# Patient Record
Sex: FEMALE | Race: WHITE | NOT HISPANIC OR LATINO
[De-identification: names, ages, dates, MRNs, and addresses within clinical notes are randomized per-mention and may not be internally consistent; named-entity substitution may affect disease eponyms.]

---

## 2017-03-02 ENCOUNTER — RECORD ABSTRACTING (OUTPATIENT)
Age: 82
End: 2017-03-02

## 2017-03-02 ENCOUNTER — APPOINTMENT (OUTPATIENT)
Dept: CARDIOTHORACIC SURGERY | Facility: CLINIC | Age: 82
End: 2017-03-02

## 2017-03-02 VITALS
OXYGEN SATURATION: 97 % | HEIGHT: 60 IN | DIASTOLIC BLOOD PRESSURE: 65 MMHG | RESPIRATION RATE: 16 BRPM | SYSTOLIC BLOOD PRESSURE: 125 MMHG | WEIGHT: 114 LBS | HEART RATE: 65 BPM | BODY MASS INDEX: 22.38 KG/M2

## 2017-03-02 DIAGNOSIS — Z82.49 FAMILY HISTORY OF ISCHEMIC HEART DISEASE AND OTHER DISEASES OF THE CIRCULATORY SYSTEM: ICD-10-CM

## 2017-03-02 DIAGNOSIS — Z86.79 PERSONAL HISTORY OF OTHER DISEASES OF THE CIRCULATORY SYSTEM: ICD-10-CM

## 2017-03-08 ENCOUNTER — OUTPATIENT (OUTPATIENT)
Dept: OUTPATIENT SERVICES | Facility: HOSPITAL | Age: 82
LOS: 1 days | End: 2017-03-08
Payer: MEDICARE

## 2017-03-08 DIAGNOSIS — I35.0 NONRHEUMATIC AORTIC (VALVE) STENOSIS: ICD-10-CM

## 2017-03-08 LAB
BUN SERPL-MCNC: 66 MG/DL — HIGH (ref 8–20)
CREAT SERPL-MCNC: 2.45 MG/DL — HIGH (ref 0.5–1.3)

## 2017-03-08 PROCEDURE — 84520 ASSAY OF UREA NITROGEN: CPT

## 2017-03-08 PROCEDURE — 93306 TTE W/DOPPLER COMPLETE: CPT | Mod: 26

## 2017-03-08 PROCEDURE — 82565 ASSAY OF CREATININE: CPT

## 2017-03-08 PROCEDURE — 36415 COLL VENOUS BLD VENIPUNCTURE: CPT

## 2017-03-08 PROCEDURE — 93306 TTE W/DOPPLER COMPLETE: CPT

## 2017-03-09 DIAGNOSIS — I35.0 NONRHEUMATIC AORTIC (VALVE) STENOSIS: ICD-10-CM

## 2017-03-10 ENCOUNTER — OUTPATIENT (OUTPATIENT)
Dept: OUTPATIENT SERVICES | Facility: HOSPITAL | Age: 82
LOS: 1 days | End: 2017-03-10
Payer: MEDICARE

## 2017-03-10 VITALS
HEART RATE: 63 BPM | DIASTOLIC BLOOD PRESSURE: 64 MMHG | OXYGEN SATURATION: 97 % | WEIGHT: 113.1 LBS | RESPIRATION RATE: 12 BRPM | SYSTOLIC BLOOD PRESSURE: 119 MMHG | TEMPERATURE: 98 F

## 2017-03-10 VITALS
HEART RATE: 63 BPM | TEMPERATURE: 98 F | SYSTOLIC BLOOD PRESSURE: 119 MMHG | RESPIRATION RATE: 18 BRPM | OXYGEN SATURATION: 98 % | WEIGHT: 114.64 LBS | HEIGHT: 60 IN | DIASTOLIC BLOOD PRESSURE: 64 MMHG

## 2017-03-10 DIAGNOSIS — Z90.710 ACQUIRED ABSENCE OF BOTH CERVIX AND UTERUS: Chronic | ICD-10-CM

## 2017-03-10 DIAGNOSIS — Z01.818 ENCOUNTER FOR OTHER PREPROCEDURAL EXAMINATION: ICD-10-CM

## 2017-03-10 DIAGNOSIS — Z90.49 ACQUIRED ABSENCE OF OTHER SPECIFIED PARTS OF DIGESTIVE TRACT: Chronic | ICD-10-CM

## 2017-03-10 LAB
ANION GAP SERPL CALC-SCNC: 13 MMOL/L — SIGNIFICANT CHANGE UP (ref 5–17)
APTT BLD: 30.5 SEC — SIGNIFICANT CHANGE UP (ref 27.5–37.4)
BASOPHILS # BLD AUTO: 0 K/UL — SIGNIFICANT CHANGE UP (ref 0–0.2)
BASOPHILS NFR BLD AUTO: 0.8 % — SIGNIFICANT CHANGE UP (ref 0–2)
BUN SERPL-MCNC: 64 MG/DL — HIGH (ref 8–20)
CALCIUM SERPL-MCNC: 8.9 MG/DL — SIGNIFICANT CHANGE UP (ref 8.6–10.2)
CHLORIDE SERPL-SCNC: 100 MMOL/L — SIGNIFICANT CHANGE UP (ref 98–107)
CO2 SERPL-SCNC: 25 MMOL/L — SIGNIFICANT CHANGE UP (ref 22–29)
CREAT SERPL-MCNC: 1.98 MG/DL — HIGH (ref 0.5–1.3)
EOSINOPHIL # BLD AUTO: 0.2 K/UL — SIGNIFICANT CHANGE UP (ref 0–0.5)
EOSINOPHIL NFR BLD AUTO: 4.6 % — SIGNIFICANT CHANGE UP (ref 0–6)
GLUCOSE SERPL-MCNC: 121 MG/DL — HIGH (ref 70–115)
HCT VFR BLD CALC: 33.8 % — LOW (ref 37–47)
HGB BLD-MCNC: 11.1 G/DL — LOW (ref 12–16)
INR BLD: 1.08 RATIO — SIGNIFICANT CHANGE UP (ref 0.88–1.16)
LYMPHOCYTES # BLD AUTO: 1.5 K/UL — SIGNIFICANT CHANGE UP (ref 1–4.8)
LYMPHOCYTES # BLD AUTO: 31.5 % — SIGNIFICANT CHANGE UP (ref 20–55)
MCHC RBC-ENTMCNC: 31.8 PG — HIGH (ref 27–31)
MCHC RBC-ENTMCNC: 32.8 G/DL — SIGNIFICANT CHANGE UP (ref 32–36)
MCV RBC AUTO: 96.8 FL — SIGNIFICANT CHANGE UP (ref 81–99)
MONOCYTES # BLD AUTO: 0.4 K/UL — SIGNIFICANT CHANGE UP (ref 0–0.8)
MONOCYTES NFR BLD AUTO: 8.3 % — SIGNIFICANT CHANGE UP (ref 3–10)
NEUTROPHILS # BLD AUTO: 2.6 K/UL — SIGNIFICANT CHANGE UP (ref 1.8–8)
NEUTROPHILS NFR BLD AUTO: 54.6 % — SIGNIFICANT CHANGE UP (ref 37–73)
PLATELET # BLD AUTO: 159 K/UL — SIGNIFICANT CHANGE UP (ref 150–400)
POTASSIUM SERPL-MCNC: 4.5 MMOL/L — SIGNIFICANT CHANGE UP (ref 3.5–5.3)
POTASSIUM SERPL-SCNC: 4.5 MMOL/L — SIGNIFICANT CHANGE UP (ref 3.5–5.3)
PROTHROM AB SERPL-ACNC: 11.9 SEC — SIGNIFICANT CHANGE UP (ref 10–13.1)
RBC # BLD: 3.49 M/UL — LOW (ref 4.4–5.2)
RBC # FLD: 13.4 % — SIGNIFICANT CHANGE UP (ref 11–15.6)
SODIUM SERPL-SCNC: 138 MMOL/L — SIGNIFICANT CHANGE UP (ref 135–145)
WBC # BLD: 4.8 K/UL — SIGNIFICANT CHANGE UP (ref 4.8–10.8)
WBC # FLD AUTO: 4.8 K/UL — SIGNIFICANT CHANGE UP (ref 4.8–10.8)

## 2017-03-10 PROCEDURE — 85730 THROMBOPLASTIN TIME PARTIAL: CPT

## 2017-03-10 PROCEDURE — 80048 BASIC METABOLIC PNL TOTAL CA: CPT

## 2017-03-10 PROCEDURE — 93010 ELECTROCARDIOGRAM REPORT: CPT

## 2017-03-10 PROCEDURE — 93005 ELECTROCARDIOGRAM TRACING: CPT

## 2017-03-10 PROCEDURE — 85610 PROTHROMBIN TIME: CPT

## 2017-03-10 PROCEDURE — G0463: CPT

## 2017-03-10 PROCEDURE — 85027 COMPLETE CBC AUTOMATED: CPT

## 2017-03-10 NOTE — ASU PATIENT PROFILE, ADULT - PMH
Anxiety    Aortic stenosis    Atrial fibrillation    Bruises easily    CHF (congestive heart failure)    CLIFTON (dyspnea on exertion)    HTN (hypertension)    NSTEMI (non-ST elevated myocardial infarction)    Orthopnea    Urine frequency

## 2017-03-10 NOTE — H&P PST ADULT - HISTORY OF PRESENT ILLNESS
This is a 89 year old female that was recently diagnosed with CHF she was admitted to [facility] for 3 days of treatment .  At that hospital admission she was diagnosed with severe AS.  Her Cardiologist Dr Vincenzo Tuttle out of [place] referred her to Dr Anne for possible TAVR.  She was recently consulted with Dr Anne and was found to have severe renal dysfunction.  She is now under the care of Dr Asad Espana nephrology.  I discuseed her recent creatine 2.45 and he recommend 3 hours of hydration with NS at 100cc hr and then 60cchr after that for 2 more hours. (due to age as well as GFR) .     She will hold lasix the day of the cath.   ECHO EF 55-60% aortic gradient was 84.53/  57.84  severe AS     Dr Asad Espana # is [phone] Cell

## 2017-03-13 ENCOUNTER — OUTPATIENT (OUTPATIENT)
Dept: OUTPATIENT SERVICES | Facility: HOSPITAL | Age: 82
LOS: 1 days | End: 2017-03-13
Payer: MEDICARE

## 2017-03-13 VITALS
RESPIRATION RATE: 18 BRPM | HEART RATE: 65 BPM | OXYGEN SATURATION: 100 % | SYSTOLIC BLOOD PRESSURE: 127 MMHG | DIASTOLIC BLOOD PRESSURE: 54 MMHG | TEMPERATURE: 98 F

## 2017-03-13 VITALS — WEIGHT: 113.1 LBS

## 2017-03-13 DIAGNOSIS — I35.0 NONRHEUMATIC AORTIC (VALVE) STENOSIS: ICD-10-CM

## 2017-03-13 DIAGNOSIS — I25.10 ATHEROSCLEROTIC HEART DISEASE OF NATIVE CORONARY ARTERY WITHOUT ANGINA PECTORIS: ICD-10-CM

## 2017-03-13 DIAGNOSIS — Z01.818 ENCOUNTER FOR OTHER PREPROCEDURAL EXAMINATION: ICD-10-CM

## 2017-03-13 DIAGNOSIS — Z90.49 ACQUIRED ABSENCE OF OTHER SPECIFIED PARTS OF DIGESTIVE TRACT: Chronic | ICD-10-CM

## 2017-03-13 DIAGNOSIS — Z90.710 ACQUIRED ABSENCE OF BOTH CERVIX AND UTERUS: Chronic | ICD-10-CM

## 2017-03-13 PROCEDURE — 93454 CORONARY ARTERY ANGIO S&I: CPT | Mod: 26

## 2017-03-13 PROCEDURE — C1887: CPT

## 2017-03-13 PROCEDURE — 37252 INTRVASC US NONCORONARY 1ST: CPT

## 2017-03-13 PROCEDURE — 36246 INS CATH ABD/L-EXT ART 2ND: CPT

## 2017-03-13 PROCEDURE — 92979 ENDOLUMINL IVUS OCT C EA: CPT | Mod: 26,RC

## 2017-03-13 PROCEDURE — C1894: CPT

## 2017-03-13 PROCEDURE — C1753: CPT

## 2017-03-13 PROCEDURE — C1769: CPT

## 2017-03-13 PROCEDURE — 37253 INTRVASC US NONCORONARY ADDL: CPT | Mod: XU

## 2017-03-13 PROCEDURE — 93454 CORONARY ARTERY ANGIO S&I: CPT

## 2017-03-13 PROCEDURE — 92978 ENDOLUMINL IVUS OCT C 1ST: CPT | Mod: 26,LC

## 2017-03-13 RX ORDER — SODIUM CHLORIDE 9 MG/ML
1000 INJECTION INTRAMUSCULAR; INTRAVENOUS; SUBCUTANEOUS
Qty: 0 | Refills: 0 | Status: DISCONTINUED | OUTPATIENT
Start: 2017-03-13 | End: 2017-03-28

## 2017-03-13 RX ORDER — ASPIRIN/CALCIUM CARB/MAGNESIUM 324 MG
81 TABLET ORAL ONCE
Qty: 0 | Refills: 0 | Status: COMPLETED | OUTPATIENT
Start: 2017-03-13 | End: 2017-03-13

## 2017-03-13 RX ADMIN — Medication 81 MILLIGRAM(S): at 08:35

## 2017-03-13 RX ADMIN — SODIUM CHLORIDE 100 MILLILITER(S): 9 INJECTION INTRAMUSCULAR; INTRAVENOUS; SUBCUTANEOUS at 09:19

## 2017-03-13 NOTE — DISCHARGE NOTE ADULT - CARE PLAN
Principal Discharge DX:	Aortic stenosis  Goal:	ADL without CP  Instructions for follow-up, activity and diet:	No heavy lifting, driving, sex, tub baths, swimming, or any activity that submerges the lower half of the body in water for 48 hours.  Limited walking and stairs for 48 hours.    Change the bandaid after 24 hours and every 24 hours after that.  Keep the puncture site dry and covered with a bandaid until a scab forms.    Observe the site frequently.  If bleeding or a large lump (the size of a golf ball or bigger) occurs lie flat, apply continuous direct pressure just above the puncture site for at least 10 minutes, and notify your physician immediately.  If the bleeding cannot be controlled, call 911 immediately for assistance.  Notify your physician of pain, swelling or any drainage.    Notify your physician immediately if coldness, numbness, discoloration or pain in your foot occurs.

## 2017-03-13 NOTE — DISCHARGE NOTE ADULT - MEDICATION SUMMARY - MEDICATIONS TO TAKE
I will START or STAY ON the medications listed below when I get home from the hospital:    RESVERATROL  --   once a day  -- Indication: For supplement    ESSENTIAL BALANCE ENZYME  --   once a day  -- Indication: For supplement    UBIQUNONE  --   once a day  -- Indication: For supplement    aspirin 81 mg oral tablet  -- 1 tab(s) by mouth once a day  -- Indication: For supplement    calcium (as carbonate) 500 mg oral tablet  --  by mouth once a day  -- Indication: For Supplement    Plavix 75 mg oral tablet  -- 1 tab(s) by mouth once a day  -- Indication: For Nonrheumatic aortic valve stenosis    Xanax 0.25 mg oral tablet  --  by mouth once a day (at bedtime)  -- Indication: For anxiety    atenolol 50 mg oral tablet  --  by mouth 2 times a day  -- Indication: For Nonrheumatic aortic valve stenosis    furosemide 20 mg oral tablet  -- 1 tab(s) by mouth once a day  -- Indication: For Nonrheumatic aortic valve stenosis    Arginine  --  by mouth once a day  -- Indication: For supplement    timolol hemihydrate 0.5% ophthalmic solution  --  to each affected eye LEFT EYE  -- Indication: For glaucoma    Primadophilus Bifidus oral capsule  -- 1 cap(s) by mouth once a day  -- Indication: For supplement    multivitamin  --   once a day  -- Indication: For supplement    Vitamin D3 5000 intl units oral tablet  -- 1 tab(s) by mouth once a day  -- Indication: For supplement    Vitamin C 500 mg oral tablet  -- 1 tab(s) by mouth once a day  -- Indication: For supplement

## 2017-03-13 NOTE — DISCHARGE NOTE ADULT - CARE PROVIDER_API CALL
Guillermo Anne), Surgery; Thoracic and Cardiac Surgery  [address] [place] [address]  Phone: [phone]  Fax: [phone]

## 2017-03-13 NOTE — DISCHARGE NOTE ADULT - PLAN OF CARE
ADL without CP No heavy lifting, driving, sex, tub baths, swimming, or any activity that submerges the lower half of the body in water for 48 hours.  Limited walking and stairs for 48 hours.    Change the bandaid after 24 hours and every 24 hours after that.  Keep the puncture site dry and covered with a bandaid until a scab forms.    Observe the site frequently.  If bleeding or a large lump (the size of a golf ball or bigger) occurs lie flat, apply continuous direct pressure just above the puncture site for at least 10 minutes, and notify your physician immediately.  If the bleeding cannot be controlled, call 911 immediately for assistance.  Notify your physician of pain, swelling or any drainage.    Notify your physician immediately if coldness, numbness, discoloration or pain in your foot occurs.

## 2017-03-13 NOTE — DISCHARGE NOTE ADULT - HOSPITAL COURSE
Elective cath pre-op for TAVR. No intervention needed.  D/C home today.  Follow up with surgery for results.

## 2017-03-13 NOTE — DISCHARGE NOTE ADULT - NS AS ACTIVITY OBS
Do not make important decisions/No Heavy lifting/straining/Showering allowed/Sex allowed/Stairs allowed/Do not drive or operate machinery/Walking-Indoors allowed

## 2017-03-13 NOTE — DISCHARGE NOTE ADULT - CARE PROVIDERS DIRECT ADDRESSES
[email or web address]

## 2017-03-20 ENCOUNTER — INPATIENT (INPATIENT)
Facility: HOSPITAL | Age: 82
LOS: 6 days | Discharge: HOME HEALTH | DRG: 266 | End: 2017-03-27
Attending: THORACIC SURGERY (CARDIOTHORACIC VASCULAR SURGERY) | Admitting: THORACIC SURGERY (CARDIOTHORACIC VASCULAR SURGERY)
Payer: MEDICARE

## 2017-03-20 VITALS
DIASTOLIC BLOOD PRESSURE: 74 MMHG | WEIGHT: 113.1 LBS | TEMPERATURE: 98 F | RESPIRATION RATE: 24 BRPM | HEART RATE: 88 BPM | OXYGEN SATURATION: 100 % | SYSTOLIC BLOOD PRESSURE: 107 MMHG

## 2017-03-20 DIAGNOSIS — N18.4 CHRONIC KIDNEY DISEASE, STAGE 4 (SEVERE): ICD-10-CM

## 2017-03-20 DIAGNOSIS — R06.00 DYSPNEA, UNSPECIFIED: ICD-10-CM

## 2017-03-20 DIAGNOSIS — I10 ESSENTIAL (PRIMARY) HYPERTENSION: ICD-10-CM

## 2017-03-20 DIAGNOSIS — H40.9 UNSPECIFIED GLAUCOMA: ICD-10-CM

## 2017-03-20 DIAGNOSIS — Z90.49 ACQUIRED ABSENCE OF OTHER SPECIFIED PARTS OF DIGESTIVE TRACT: Chronic | ICD-10-CM

## 2017-03-20 DIAGNOSIS — I35.0 NONRHEUMATIC AORTIC (VALVE) STENOSIS: ICD-10-CM

## 2017-03-20 DIAGNOSIS — Z90.710 ACQUIRED ABSENCE OF BOTH CERVIX AND UTERUS: Chronic | ICD-10-CM

## 2017-03-20 DIAGNOSIS — I50.33 ACUTE ON CHRONIC DIASTOLIC (CONGESTIVE) HEART FAILURE: ICD-10-CM

## 2017-03-20 DIAGNOSIS — I48.0 PAROXYSMAL ATRIAL FIBRILLATION: ICD-10-CM

## 2017-03-20 LAB
ALBUMIN SERPL ELPH-MCNC: 3.9 G/DL — SIGNIFICANT CHANGE UP (ref 3.3–5.2)
ALP SERPL-CCNC: 48 U/L — SIGNIFICANT CHANGE UP (ref 40–120)
ALT FLD-CCNC: 16 U/L — SIGNIFICANT CHANGE UP
ANION GAP SERPL CALC-SCNC: 17 MMOL/L — SIGNIFICANT CHANGE UP (ref 5–17)
APTT BLD: 26.6 SEC — LOW (ref 27.5–37.4)
AST SERPL-CCNC: 25 U/L — SIGNIFICANT CHANGE UP
BASOPHILS # BLD AUTO: 0.1 K/UL — SIGNIFICANT CHANGE UP (ref 0–0.2)
BASOPHILS NFR BLD AUTO: 0.5 % — SIGNIFICANT CHANGE UP (ref 0–2)
BILIRUB SERPL-MCNC: 0.5 MG/DL — SIGNIFICANT CHANGE UP (ref 0.4–2)
BUN SERPL-MCNC: 78 MG/DL — HIGH (ref 8–20)
CALCIUM SERPL-MCNC: 9.1 MG/DL — SIGNIFICANT CHANGE UP (ref 8.6–10.2)
CHLORIDE SERPL-SCNC: 101 MMOL/L — SIGNIFICANT CHANGE UP (ref 98–107)
CK SERPL-CCNC: 31 U/L — SIGNIFICANT CHANGE UP (ref 25–170)
CO2 SERPL-SCNC: 23 MMOL/L — SIGNIFICANT CHANGE UP (ref 22–29)
CREAT SERPL-MCNC: 2.18 MG/DL — HIGH (ref 0.5–1.3)
D DIMER BLD IA.RAPID-MCNC: 673 NG/ML DDU — HIGH
EOSINOPHIL # BLD AUTO: 0.3 K/UL — SIGNIFICANT CHANGE UP (ref 0–0.5)
EOSINOPHIL NFR BLD AUTO: 2.4 % — SIGNIFICANT CHANGE UP (ref 0–6)
GLUCOSE SERPL-MCNC: 132 MG/DL — HIGH (ref 70–115)
HCT VFR BLD CALC: 34.4 % — LOW (ref 37–47)
HGB BLD-MCNC: 11.6 G/DL — LOW (ref 12–16)
INR BLD: 1.08 RATIO — SIGNIFICANT CHANGE UP (ref 0.88–1.16)
LYMPHOCYTES # BLD AUTO: 19.9 % — LOW (ref 20–55)
LYMPHOCYTES # BLD AUTO: 2.6 K/UL — SIGNIFICANT CHANGE UP (ref 1–4.8)
MCHC RBC-ENTMCNC: 32.4 PG — HIGH (ref 27–31)
MCHC RBC-ENTMCNC: 33.7 G/DL — SIGNIFICANT CHANGE UP (ref 32–36)
MCV RBC AUTO: 96.1 FL — SIGNIFICANT CHANGE UP (ref 81–99)
MONOCYTES # BLD AUTO: 1.2 K/UL — HIGH (ref 0–0.8)
MONOCYTES NFR BLD AUTO: 8.8 % — SIGNIFICANT CHANGE UP (ref 3–10)
NEUTROPHILS # BLD AUTO: 9 K/UL — HIGH (ref 1.8–8)
NEUTROPHILS NFR BLD AUTO: 68.2 % — SIGNIFICANT CHANGE UP (ref 37–73)
NT-PROBNP SERPL-SCNC: HIGH PG/ML (ref 0–300)
PLATELET # BLD AUTO: 197 K/UL — SIGNIFICANT CHANGE UP (ref 150–400)
POTASSIUM SERPL-MCNC: 4.4 MMOL/L — SIGNIFICANT CHANGE UP (ref 3.5–5.3)
POTASSIUM SERPL-SCNC: 4.4 MMOL/L — SIGNIFICANT CHANGE UP (ref 3.5–5.3)
PROT SERPL-MCNC: 7.7 G/DL — SIGNIFICANT CHANGE UP (ref 6.6–8.7)
PROTHROM AB SERPL-ACNC: 11.9 SEC — SIGNIFICANT CHANGE UP (ref 10–13.1)
RBC # BLD: 3.58 M/UL — LOW (ref 4.4–5.2)
RBC # FLD: 13.2 % — SIGNIFICANT CHANGE UP (ref 11–15.6)
SODIUM SERPL-SCNC: 141 MMOL/L — SIGNIFICANT CHANGE UP (ref 135–145)
TROPONIN T SERPL-MCNC: 0.13 NG/ML — HIGH (ref 0–0.06)
WBC # BLD: 13.1 K/UL — HIGH (ref 4.8–10.8)
WBC # FLD AUTO: 13.1 K/UL — HIGH (ref 4.8–10.8)

## 2017-03-20 PROCEDURE — 93010 ELECTROCARDIOGRAM REPORT: CPT

## 2017-03-20 PROCEDURE — 99223 1ST HOSP IP/OBS HIGH 75: CPT

## 2017-03-20 PROCEDURE — 99291 CRITICAL CARE FIRST HOUR: CPT

## 2017-03-20 PROCEDURE — 71010: CPT | Mod: 26

## 2017-03-20 PROCEDURE — 99232 SBSQ HOSP IP/OBS MODERATE 35: CPT

## 2017-03-20 RX ORDER — ASCORBIC ACID 60 MG
500 TABLET,CHEWABLE ORAL DAILY
Qty: 0 | Refills: 0 | Status: DISCONTINUED | OUTPATIENT
Start: 2017-03-20 | End: 2017-03-24

## 2017-03-20 RX ORDER — TIMOLOL 0.5 %
1 DROPS OPHTHALMIC (EYE)
Qty: 0 | Refills: 0 | Status: DISCONTINUED | OUTPATIENT
Start: 2017-03-20 | End: 2017-03-24

## 2017-03-20 RX ORDER — CLOPIDOGREL BISULFATE 75 MG/1
75 TABLET, FILM COATED ORAL DAILY
Qty: 0 | Refills: 0 | Status: DISCONTINUED | OUTPATIENT
Start: 2017-03-20 | End: 2017-03-22

## 2017-03-20 RX ORDER — SODIUM CHLORIDE 9 MG/ML
3 INJECTION INTRAMUSCULAR; INTRAVENOUS; SUBCUTANEOUS EVERY 8 HOURS
Qty: 0 | Refills: 0 | Status: DISCONTINUED | OUTPATIENT
Start: 2017-03-20 | End: 2017-03-24

## 2017-03-20 RX ORDER — ASPIRIN/CALCIUM CARB/MAGNESIUM 324 MG
81 TABLET ORAL DAILY
Qty: 0 | Refills: 0 | Status: DISCONTINUED | OUTPATIENT
Start: 2017-03-20 | End: 2017-03-24

## 2017-03-20 RX ORDER — FUROSEMIDE 40 MG
20 TABLET ORAL DAILY
Qty: 0 | Refills: 0 | Status: DISCONTINUED | OUTPATIENT
Start: 2017-03-21 | End: 2017-03-24

## 2017-03-20 RX ORDER — ATENOLOL 25 MG/1
50 TABLET ORAL ONCE
Qty: 0 | Refills: 0 | Status: COMPLETED | OUTPATIENT
Start: 2017-03-20 | End: 2017-03-20

## 2017-03-20 RX ORDER — FUROSEMIDE 40 MG
20 TABLET ORAL ONCE
Qty: 0 | Refills: 0 | Status: COMPLETED | OUTPATIENT
Start: 2017-03-20 | End: 2017-03-20

## 2017-03-20 RX ORDER — ATENOLOL 25 MG/1
50 TABLET ORAL EVERY 12 HOURS
Qty: 0 | Refills: 0 | Status: DISCONTINUED | OUTPATIENT
Start: 2017-03-20 | End: 2017-03-24

## 2017-03-20 RX ORDER — ALPRAZOLAM 0.25 MG
0.12 TABLET ORAL AT BEDTIME
Qty: 0 | Refills: 0 | Status: DISCONTINUED | OUTPATIENT
Start: 2017-03-20 | End: 2017-03-24

## 2017-03-20 RX ORDER — CALCIUM CARBONATE 500(1250)
1 TABLET ORAL DAILY
Qty: 0 | Refills: 0 | Status: DISCONTINUED | OUTPATIENT
Start: 2017-03-20 | End: 2017-03-24

## 2017-03-20 RX ORDER — SODIUM CHLORIDE 9 MG/ML
3 INJECTION INTRAMUSCULAR; INTRAVENOUS; SUBCUTANEOUS ONCE
Qty: 0 | Refills: 0 | Status: COMPLETED | OUTPATIENT
Start: 2017-03-20 | End: 2017-03-20

## 2017-03-20 RX ADMIN — Medication 81 MILLIGRAM(S): at 12:32

## 2017-03-20 RX ADMIN — CLOPIDOGREL BISULFATE 75 MILLIGRAM(S): 75 TABLET, FILM COATED ORAL at 12:32

## 2017-03-20 RX ADMIN — SODIUM CHLORIDE 3 MILLILITER(S): 9 INJECTION INTRAMUSCULAR; INTRAVENOUS; SUBCUTANEOUS at 16:07

## 2017-03-20 RX ADMIN — ATENOLOL 50 MILLIGRAM(S): 25 TABLET ORAL at 09:27

## 2017-03-20 RX ADMIN — Medication 1 TABLET(S): at 16:18

## 2017-03-20 RX ADMIN — Medication 20 MILLIGRAM(S): at 10:27

## 2017-03-20 RX ADMIN — SODIUM CHLORIDE 3 MILLILITER(S): 9 INJECTION INTRAMUSCULAR; INTRAVENOUS; SUBCUTANEOUS at 22:56

## 2017-03-20 RX ADMIN — Medication 500 MILLIGRAM(S): at 16:18

## 2017-03-20 RX ADMIN — SODIUM CHLORIDE 3 MILLILITER(S): 9 INJECTION INTRAMUSCULAR; INTRAVENOUS; SUBCUTANEOUS at 08:24

## 2017-03-20 RX ADMIN — ATENOLOL 50 MILLIGRAM(S): 25 TABLET ORAL at 23:14

## 2017-03-20 RX ADMIN — Medication 1 TABLET(S): at 23:10

## 2017-03-20 RX ADMIN — Medication 0.12 MILLIGRAM(S): at 23:10

## 2017-03-20 NOTE — H&P ADULT - NSHPPHYSICALEXAM_GEN_ALL_CORE
neuro: A+O x 3, non-focal, speech clear and intact  HEENT: R eye with subconjunctival hemmorhage, right suborbital ecchymosis  CV: RRR, +S1,S2, +ANA  Pulm: scant left basilar rales otherwise CTA, equal bilaterally, normal effort  Abd: soft, NT, ND, +BS  Ext: TOVAR x 4, trace edema  Skin: scattered ecchymosis

## 2017-03-20 NOTE — H&P ADULT - ASSESSMENT
89 year old female with PMH as above who p/w c/o worseing CLIFTON over past few days. Admitted with acute on chronic diastolic heart failure, severe AS.

## 2017-03-20 NOTE — ED ADULT NURSE REASSESSMENT NOTE - NS ED NURSE REASSESS COMMENT FT1
LAte entry : pt was taken off the BIBAP by RT , tolerating oxygen intake via N/C , no distress noted at this time, pt offers no complaints reading newspaper at present, VSS will continue to monitor

## 2017-03-20 NOTE — ED ADULT TRIAGE NOTE - CHIEF COMPLAINT QUOTE
BIBA, Patient states that she cant breathe, as per EMS she woke up 30 minutes ago with difficulty breathing while she was getting out of bed. Patient has a history of CHF, patient unable to speak in complete sentences. Dr. Mcdonald and respiratory called to bedside

## 2017-03-20 NOTE — H&P ADULT - PMH
Anxiety    Aortic stenosis    Atrial fibrillation    Bruises easily    CHF (congestive heart failure)    CLIFTON (dyspnea on exertion)    HTN (hypertension)    NSTEMI (non-ST elevated myocardial infarction)    Orthopnea    Urine frequency Anxiety    Aortic stenosis    Atrial fibrillation    Bruises easily    CHF (congestive heart failure)    CKD (chronic kidney disease) stage 4, GFR 15-29 ml/min    CLIFTON (dyspnea on exertion)    HTN (hypertension)    NSTEMI (non-ST elevated myocardial infarction)    Orthopnea    Urine frequency

## 2017-03-20 NOTE — CONSULT NOTE ADULT - SUBJECTIVE AND OBJECTIVE BOX
Renal :      CC: shortness of breath.     HPI: Patient is a  89yo  Female with history of mild CAD, severe AS planned for possible TAVR.     Presenting with symptoms of shortness of breath,  Increased in the past few days. Denies CP. Placed on Bipap and did well.     Know CKD - 4 , Under the care of A Nephrologist ( Dr. Lee et al )    PAST MEDICAL & SURGICAL HISTORY:  CKD (chronic kidney disease) stage 4, GFR 15-29 ml/min  Urine frequency  NSTEMI (non-ST elevated myocardial infarction)  Atrial fibrillation  HTN (hypertension)  CHF (congestive heart failure)  Aortic stenosis    H/O abdominal hysterectomy  History of cholecystectomy    FAMILY HISTORY:  No pertinent family history in first degree relatives    SOCIAL HISTORY: no EtOH, drugs or tobacco    MEDICATIONS  (STANDING):  sodium chloride 0.9% lock flush 3milliLiter(s) IV Push every 8 hours  aspirin enteric coated 81milliGRAM(s) Oral daily  calcium carbonate 500 mG (Tums) Chewable 1Tablet(s) Chew daily  clopidogrel Tablet 75milliGRAM(s) Oral daily  ATENolol  Tablet 50milliGRAM(s) Oral every 12 hours  timolol 0.5% Solution 1Drop(s) Left EYE two times a day  multivitamin 1Tablet(s) Oral daily  ascorbic acid 500milliGRAM(s) Oral daily    ROS:     Cardiology & Renal As in HPI,    All others negative    PHYSICAL EXAM:  Vital Signs Last 24 Hrs  T(C): 36.7, Max: 37 (03-20 @ 07:45)  T(F): 98.1, Max: 98.6 (03-20 @ 07:45)  HR: 72 (72 - 88)  BP: 108/52 (107/74 - 184/77)  BP(mean): --  RR: 22 (22 - 24)  SpO2: 99% (97% - 100%)      Appearance: Normal, On BiPAP, Orthopnea  Much improved,  HEENT:   Normal oral mucosa, PERRL, EOMI	  Lymphatic: No lymphadenopathy  Cardiovascular: Normal S1 S2, No JVD, No murmurs, No edema  Respiratory: Lungs clear to auscultation	  Psychiatry: A & O x 3, Mood & affect appropriate  Gastrointestinal:  Soft, Non-tender, + BS	  Skin: No rashes, No ecchymoses, No cyanosis  Neurologic: Non-focal  Extremities: Normal range of motion, No clubbing, cyanosis or edema  Vascular: Peripheral pulses palpable 2+ bilaterally    ECG: Deep t wave inversions in the anterior leads.     LABS:                        11.6   13.1  )-----------( 197      ( 20 Mar 2017 07:26 )             34.4     20 Mar 2017 07:26    141    |  101    |  78.0   ----------------------------<  132    4.4     |  23.0   |  2.18     Ca    9.1        20 Mar 2017 07:26    TPro  7.7    /  Alb  3.9    /  TBili  0.5    /  DBili  x      /  AST  25     /  ALT  16     /  AlkPhos  48     20 Mar 2017 07:26    PT/INR - ( 20 Mar 2017 07:26 )   PT: 11.9 sec;   INR: 1.08 ratio         PTT - ( 20 Mar 2017 07:26 )  PTT:26.6 sec    CARDIAC MARKERS ( 20 Mar 2017 07:26 )  x     / 0.13 ng/mL / 31 U/L / x

## 2017-03-20 NOTE — ED ADULT NURSE NOTE - OBJECTIVE STATEMENT
LAte entry : Pt received in the room already on the bipap placed in ambulance bay earlier As per daughter SOB started few days ago , pt recently had cardiac cath and was myranda off the Lasix, pt [place] , daughter provided all medical info

## 2017-03-20 NOTE — PROGRESS NOTE ADULT - SUBJECTIVE AND OBJECTIVE BOX
CC: shortness of breath.     HPI: Patient is a  89y Female with history of mild CAD, severe AS planned for possible TAVR. Presenting with symptoms of shortness of breath. Increased in the past few days. denies CP. Placed on Bipap and did well.     PAST MEDICAL & SURGICAL HISTORY:  CKD (chronic kidney disease) stage 4, GFR 15-29 ml/min  Urine frequency  NSTEMI (non-ST elevated myocardial infarction)  Atrial fibrillation  HTN (hypertension)  Orthopnea  CLIFTON (dyspnea on exertion)  CHF (congestive heart failure)  Bruises easily  Aortic stenosis  Anxiety  H/O abdominal hysterectomy  History of cholecystectomy    FAMILY HISTORY:  No pertinent family history in first degree relatives    SOCIAL HISTORY: no EtOH, drugs or tobacco    MEDICATIONS  (STANDING):  sodium chloride 0.9% lock flush 3milliLiter(s) IV Push every 8 hours  aspirin enteric coated 81milliGRAM(s) Oral daily  calcium carbonate 500 mG (Tums) Chewable 1Tablet(s) Chew daily  clopidogrel Tablet 75milliGRAM(s) Oral daily  ATENolol  Tablet 50milliGRAM(s) Oral every 12 hours  timolol 0.5% Solution 1Drop(s) Left EYE two times a day  multivitamin 1Tablet(s) Oral daily  ascorbic acid 500milliGRAM(s) Oral daily    ROS: All others negative    PHYSICAL EXAM:  Vital Signs Last 24 Hrs  T(C): 36.7, Max: 37 (03-20 @ 07:45)  T(F): 98.1, Max: 98.6 (03-20 @ 07:45)  HR: 72 (72 - 88)  BP: 108/52 (107/74 - 184/77)  BP(mean): --  RR: 22 (22 - 24)  SpO2: 99% (97% - 100%)  I&O's Summary    Appearance: Normal	  HEENT:   Normal oral mucosa, PERRL, EOMI	  Lymphatic: No lymphadenopathy  Cardiovascular: Normal S1 S2, No JVD, No murmurs, No edema  Respiratory: Lungs clear to auscultation	  Psychiatry: A & O x 3, Mood & affect appropriate  Gastrointestinal:  Soft, Non-tender, + BS	  Skin: No rashes, No ecchymoses, No cyanosis  Neurologic: Non-focal  Extremities: Normal range of motion, No clubbing, cyanosis or edema  Vascular: Peripheral pulses palpable 2+ bilaterally    ECG: Deep t wave inversions in the anterior leads.   LABS:                        11.6   13.1  )-----------( 197      ( 20 Mar 2017 07:26 )             34.4     20 Mar 2017 07:26    141    |  101    |  78.0   ----------------------------<  132    4.4     |  23.0   |  2.18     Ca    9.1        20 Mar 2017 07:26    TPro  7.7    /  Alb  3.9    /  TBili  0.5    /  DBili  x      /  AST  25     /  ALT  16     /  AlkPhos  48     20 Mar 2017 07:26    PT/INR - ( 20 Mar 2017 07:26 )   PT: 11.9 sec;   INR: 1.08 ratio         PTT - ( 20 Mar 2017 07:26 )  PTT:26.6 sec  CARDIAC MARKERS ( 20 Mar 2017 07:26 )  x     / 0.13 ng/mL / 31 U/L / x     / x

## 2017-03-20 NOTE — H&P ADULT - PROBLEM SELECTOR PLAN 1
admit to [facility]  lasix 20mg IV x 1 now  BiPAP PRN for SOB/increased work of breathing  seen and evaluated by cardiology

## 2017-03-20 NOTE — ED PROVIDER NOTE - CRITICAL CARE INDICATION, MLM
patient was critically ill... Patient was critically ill with a high probability of imminent or life threatening deterioration.  ELDERLY FEMALE WITH COMPLICATED MEDICAL HISTORY PRESENTS WITH RESPIRATORY DISTRESS. PATIENT REQUIRED STAT EXAM, LABS AND BIPAP. EKG + CHANGES. IN PROCESS OF PRE OPING FOR A TAVR WITH DR MORRIS. WILL ADMIT CTICU

## 2017-03-20 NOTE — H&P ADULT - NSHPLABSRESULTS_GEN_ALL_CORE
Complete Blood Count + Automated Diff (03.20.17 @ 07:26)    WBC Count: 13.1 K/uL    Hemoglobin: 11.6 g/dL    Hematocrit: 34.4 %    Platelet Count - Automated: 197 K/uL      Comprehensive Metabolic Panel (03.20.17 @ 07:26)    Sodium, Serum: 141 mmol/L    Potassium, Serum: 4.4 mmol/L    Chloride, Serum: 101 mmol/L    Carbon Dioxide, Serum: 23.0 mmol/L    Blood Urea Nitrogen, Serum: 78.0 mg/dL    Creatinine, Serum: 2.18 mg/dL    Glucose, Serum: 132 mg/dL      Serum Pro-Brain Natriuretic Peptide (03.20.17 @ 07:26)    Serum Pro-Brain Natriuretic Peptide: 51878    Prothrombin Time and INR, Plasma (03.20.17 @ 07:26)    Prothrombin Time, Plasma: 11.9 sec    INR: 1.08    Activated Partial Thromboplastin Time (03.20.17 @ 07:26)    Activated Partial Thromboplastin Time: 26.6    D-Dimer Assay, Quantitative (03.20.17 @ 07:26)    D-Dimer Assay, Quantitative: 673    3/20/17 @ 0746 CXR:  FINDINGS: Single frontal view of the chest demonstrates mild CHF. The cardiomediastinal silhouette is enlarged. No acute osseous abnormalities. Tiny right-sided pleural effusion.  IMPRESSION: Cardiomegaly with mild CHF.

## 2017-03-20 NOTE — ED PROVIDER NOTE - CARE PLAN
Principal Discharge DX:	Respiratory distress  Secondary Diagnosis:	Acute congestive heart failure, unspecified congestive heart failure type

## 2017-03-20 NOTE — H&P ADULT - HISTORY OF PRESENT ILLNESS
89 year old female with PMH know severe AS, non-obstructive CAD, HTN, NSTEMI, chronic diastolic heart failure, HTN, PAF (only on aspirin and plavix, no AC), bruises easily. Pt presented to ED this am with c/o worseing CLIFTON over past few days. Pt also reported one episode of urinary incontinence and dizziness a few days ago which self-resolved. Pt denies CP, palpitations, syncope, weakness, N/V, fever/chills, cough, dysuria, abd pain, or other c/o. Of note: pt woke up on 3/17 with right eye eccymosis and subconjunctival hemmorhage. Denies trauma, sneezing, cough. 89 year old female with PMH know severe AS, non-obstructive CAD, HTN, NSTEMI, stage 4 CKD, chronic diastolic heart failure, HTN, PAF (only on aspirin and plavix, no AC), bruises easily. Pt presented to ED this am with c/o worseing CLIFTON over past few days. Pt also reported one episode of urinary incontinence and dizziness a few days ago which self-resolved. Pt denies CP, palpitations, syncope, weakness, N/V, fever/chills, cough, dysuria, abd pain, or other c/o. Of note: pt woke up on 3/17 with right eye eccymosis and subconjunctival hemmorhage. Denies trauma, sneezing, cough.

## 2017-03-20 NOTE — ED PROVIDER NOTE - MEDICAL DECISION MAKING DETAILS
89 YO FEMALE PRESENTS IN CHF, REQUIRING BIPAP INTERVENTION. ADMIT TO HOSPITALIST AND CALL [facility] CARDIO, DR MORRIS TO BE NOTIFIED(HER CT SURGEON - PLANNING A TAVR)

## 2017-03-20 NOTE — ED PROVIDER NOTE - CRITICAL CARE PROVIDED
interpretation of diagnostic studies/40 MINUTES/consult w/ pt's family directly relating to pts condition/direct patient care (not related to procedure)/consultation with other physicians/documentation

## 2017-03-20 NOTE — H&P ADULT - NSHPOUTPATIENTPROVIDERS_GEN_ALL_CORE
Dr Venkatesh Tuttle (Cardiologist, [phone])  Dr Luis Lofton (PCP, [phone])  Dr Espana (Nephrologist, [phone])

## 2017-03-21 DIAGNOSIS — I35.0 NONRHEUMATIC AORTIC (VALVE) STENOSIS: ICD-10-CM

## 2017-03-21 LAB
24R-OH-CALCIDIOL SERPL-MCNC: 62.5 NG/ML — SIGNIFICANT CHANGE UP (ref 30–100)
ANION GAP SERPL CALC-SCNC: 13 MMOL/L — SIGNIFICANT CHANGE UP (ref 5–17)
APPEARANCE UR: CLEAR — SIGNIFICANT CHANGE UP
BACTERIA # UR AUTO: ABNORMAL
BILIRUB UR-MCNC: NEGATIVE — SIGNIFICANT CHANGE UP
BUN SERPL-MCNC: 79 MG/DL — HIGH (ref 8–20)
CALCIUM SERPL-MCNC: 8.5 MG/DL — LOW (ref 8.6–10.2)
CALCIUM SERPL-MCNC: 8.6 MG/DL — SIGNIFICANT CHANGE UP (ref 8.4–10.5)
CHLORIDE SERPL-SCNC: 103 MMOL/L — SIGNIFICANT CHANGE UP (ref 98–107)
CO2 SERPL-SCNC: 24 MMOL/L — SIGNIFICANT CHANGE UP (ref 22–29)
COLOR SPEC: YELLOW — SIGNIFICANT CHANGE UP
CREAT ?TM UR-MCNC: 54 MG/DL — SIGNIFICANT CHANGE UP
CREAT SERPL-MCNC: 2.18 MG/DL — HIGH (ref 0.5–1.3)
DIFF PNL FLD: NEGATIVE — SIGNIFICANT CHANGE UP
EPI CELLS # UR: SIGNIFICANT CHANGE UP
GLUCOSE SERPL-MCNC: 96 MG/DL — SIGNIFICANT CHANGE UP (ref 70–115)
GLUCOSE UR QL: NEGATIVE MG/DL — SIGNIFICANT CHANGE UP
HCT VFR BLD CALC: 29.2 % — LOW (ref 37–47)
HGB BLD-MCNC: 9.7 G/DL — LOW (ref 12–16)
HYALINE CASTS # UR AUTO: ABNORMAL /LPF
KETONES UR-MCNC: NEGATIVE — SIGNIFICANT CHANGE UP
LEUKOCYTE ESTERASE UR-ACNC: ABNORMAL
MAGNESIUM SERPL-MCNC: 2.2 MG/DL — SIGNIFICANT CHANGE UP (ref 1.8–2.5)
MCHC RBC-ENTMCNC: 32.1 PG — HIGH (ref 27–31)
MCHC RBC-ENTMCNC: 33.2 G/DL — SIGNIFICANT CHANGE UP (ref 32–36)
MCV RBC AUTO: 96.7 FL — SIGNIFICANT CHANGE UP (ref 81–99)
MRSA PCR RESULT.: SIGNIFICANT CHANGE UP
NITRITE UR-MCNC: NEGATIVE — SIGNIFICANT CHANGE UP
NT-PROBNP SERPL-SCNC: HIGH PG/ML (ref 0–300)
PH UR: 5 — SIGNIFICANT CHANGE UP (ref 4.8–8)
PLATELET # BLD AUTO: 162 K/UL — SIGNIFICANT CHANGE UP (ref 150–400)
POTASSIUM SERPL-MCNC: 4 MMOL/L — SIGNIFICANT CHANGE UP (ref 3.5–5.3)
POTASSIUM SERPL-SCNC: 4 MMOL/L — SIGNIFICANT CHANGE UP (ref 3.5–5.3)
PROT ?TM UR-MCNC: 8 MG/DL — SIGNIFICANT CHANGE UP (ref 0–12)
PROT UR-MCNC: NEGATIVE MG/DL — SIGNIFICANT CHANGE UP
PROT/CREAT UR-RTO: 0.15 RATIO — SIGNIFICANT CHANGE UP
PTH-INTACT FLD-MCNC: 36 PG/ML — SIGNIFICANT CHANGE UP (ref 15–65)
RBC # BLD: 3.02 M/UL — LOW (ref 4.4–5.2)
RBC # FLD: 12.9 % — SIGNIFICANT CHANGE UP (ref 11–15.6)
RBC CASTS # UR COMP ASSIST: SIGNIFICANT CHANGE UP /HPF (ref 0–4)
S AUREUS DNA NOSE QL NAA+PROBE: SIGNIFICANT CHANGE UP
SODIUM SERPL-SCNC: 140 MMOL/L — SIGNIFICANT CHANGE UP (ref 135–145)
SP GR SPEC: 1.01 — SIGNIFICANT CHANGE UP (ref 1.01–1.02)
UROBILINOGEN FLD QL: NEGATIVE MG/DL — SIGNIFICANT CHANGE UP
WBC # BLD: 7.2 K/UL — SIGNIFICANT CHANGE UP (ref 4.8–10.8)
WBC # FLD AUTO: 7.2 K/UL — SIGNIFICANT CHANGE UP (ref 4.8–10.8)
WBC UR QL: SIGNIFICANT CHANGE UP

## 2017-03-21 PROCEDURE — 93312 ECHO TRANSESOPHAGEAL: CPT | Mod: 26

## 2017-03-21 PROCEDURE — 76775 US EXAM ABDO BACK WALL LIM: CPT | Mod: 26

## 2017-03-21 PROCEDURE — 93320 DOPPLER ECHO COMPLETE: CPT | Mod: 26

## 2017-03-21 PROCEDURE — 93880 EXTRACRANIAL BILAT STUDY: CPT | Mod: 26

## 2017-03-21 PROCEDURE — 93325 DOPPLER ECHO COLOR FLOW MAPG: CPT | Mod: 26

## 2017-03-21 PROCEDURE — 76376 3D RENDER W/INTRP POSTPROCES: CPT | Mod: 26

## 2017-03-21 PROCEDURE — 99233 SBSQ HOSP IP/OBS HIGH 50: CPT

## 2017-03-21 PROCEDURE — 71010: CPT | Mod: 26

## 2017-03-21 RX ORDER — ERYTHROPOIETIN 10000 [IU]/ML
6000 INJECTION, SOLUTION INTRAVENOUS; SUBCUTANEOUS
Qty: 0 | Refills: 0 | Status: DISCONTINUED | OUTPATIENT
Start: 2017-03-21 | End: 2017-03-24

## 2017-03-21 RX ADMIN — Medication 1 TABLET(S): at 17:59

## 2017-03-21 RX ADMIN — ATENOLOL 50 MILLIGRAM(S): 25 TABLET ORAL at 17:59

## 2017-03-21 RX ADMIN — CLOPIDOGREL BISULFATE 75 MILLIGRAM(S): 75 TABLET, FILM COATED ORAL at 17:59

## 2017-03-21 RX ADMIN — ERYTHROPOIETIN 6000 UNIT(S): 10000 INJECTION, SOLUTION INTRAVENOUS; SUBCUTANEOUS at 17:59

## 2017-03-21 RX ADMIN — SODIUM CHLORIDE 3 MILLILITER(S): 9 INJECTION INTRAMUSCULAR; INTRAVENOUS; SUBCUTANEOUS at 05:39

## 2017-03-21 RX ADMIN — Medication 81 MILLIGRAM(S): at 17:59

## 2017-03-21 RX ADMIN — SODIUM CHLORIDE 3 MILLILITER(S): 9 INJECTION INTRAMUSCULAR; INTRAVENOUS; SUBCUTANEOUS at 15:35

## 2017-03-21 RX ADMIN — SODIUM CHLORIDE 3 MILLILITER(S): 9 INJECTION INTRAMUSCULAR; INTRAVENOUS; SUBCUTANEOUS at 23:01

## 2017-03-21 RX ADMIN — Medication 500 MILLIGRAM(S): at 17:59

## 2017-03-21 RX ADMIN — Medication 0.12 MILLIGRAM(S): at 23:10

## 2017-03-21 RX ADMIN — Medication 20 MILLIGRAM(S): at 05:40

## 2017-03-21 RX ADMIN — Medication 1 DROP(S): at 18:01

## 2017-03-21 NOTE — PROGRESS NOTE ADULT - SUBJECTIVE AND OBJECTIVE BOX
NEPHROLOGY INTERVAL HPI/OVERNIGHT EVENTS:    [facility] Underway for Elective TAVR,    CKD ( Scr., Baseline 1.7 mg.,     HX., CHF, PAF, HTN,     MEDICATIONS  (STANDING):  sodium chloride 0.9% lock flush 3milliLiter(s) IV Push every 8 hours  aspirin enteric coated 81milliGRAM(s) Oral daily  calcium carbonate 500 mG (Tums) Chewable 1Tablet(s) Chew daily  clopidogrel Tablet 75milliGRAM(s) Oral daily  ATENolol  Tablet 50milliGRAM(s) Oral every 12 hours  furosemide    Tablet 20milliGRAM(s) Oral daily  timolol 0.5% Solution 1Drop(s) Left EYE two times a day  multivitamin 1Tablet(s) Oral daily  ascorbic acid 500milliGRAM(s) Oral daily  epoetin audelia Injectable 6000Unit(s) SubCutaneous <User Schedule>    MEDICATIONS  (PRN):  ALPRAZolam 0.125milliGRAM(s) Oral at bedtime PRN insomnia/anxiety      Allergies    Toprol-XL (Other; Short breath; Hypotension)    REVIEW OF SYSTEMS:    CONSTITUTIONAL: No fever, weight loss, or fatigue  EYES: No eye pain, visual disturbances, or discharge  ENMT:  No difficulty hearing, tinnitus, vertigo; No sinus or throat pain  NECK: No pain or stiffness  BREASTS: No pain, masses, or nipple discharge  RESPIRATORY: No cough, wheezing, chills or hemoptysis; No shortness of breath  CARDIOVASCULAR: No chest pain, palpitations, dizziness, or leg swelling  GASTROINTESTINAL: No abdominal or epigastric pain. No nausea, vomiting, or hematemesis; No diarrhea or constipation. No melena or hematochezia.  GENITOURINARY: No dysuria, frequency, hematuria, or incontinence  NEUROLOGICAL: No headaches, memory loss, loss of strength, numbness, or tremors  SKIN: No itching, burning, rashes, or lesions   LYMPH NODES: No enlarged glands  ENDOCRINE: No heat or cold intolerance; No hair loss  MUSCULOSKELETAL: No joint pain or swelling; No muscle, back, or extremity pain  PSYCHIATRIC: No depression, anxiety, mood swings, or difficulty sleeping  HEME/LYMPH: No easy bruising, or bleeding gums  ALLERY AND IMMUNOLOGIC: No hives or eczema      Vital Signs Last 24 Hrs  T(C): 36.7, Max: 36.7 (03-20 @ 23:00)  T(F): 98, Max: 98 (03-20 @ 23:00)  HR: 100 (72 - 100)  BP: 98/64 (98/64 - 124/68)  BP(mean): --  RR: 16 (16 - 18)  SpO2: 100% (98% - 100%)    PHYSICAL EXAM:    GENERAL: NAD, well-groomed, well-developed, Pale,  HEAD:  Atraumatic, Normocephalic  EYES: EOMI, PERRLA, conjunctiva and sclera clear  ENMT: No tonsillar erythema, exudates, or enlargement; Moist mucous membranes, Good dentition, No lesions  NECK: Supple, No JVD, Normal thyroid  NERVOUS SYSTEM:  Alert & Oriented X3, Good concentration; Motor Strength 5/5 B/L upper and lower extremities; DTRs 2+ intact and symmetric  CHEST/LUNG: Clear to percussion bilaterally; No rales, rhonchi, wheezing, or rubs  HEART: Regular rate and rhythm; 3/6 ANA - Aortic Area,  ABDOMEN: Soft, Nontender, Nondistended; Bowel sounds present  EXTREMITIES:  2+ Peripheral Pulses, No clubbing, cyanosis, or edema  LYMPH: No lymphadenopathy noted  SKIN: No rashes or lesions    LABS:                        9.7    7.2   )-----------( 162      ( 21 Mar 2017 04:57 )             29.2     21 Mar 2017 05:00    140    |  103    |  79.0   ----------------------------<  96     4.0     |  24.0   |  2.18     Ca    8.5        21 Mar 2017 05:00  Mg     2.2       21 Mar 2017 05:00    TPro  7.7    /  Alb  3.9    /  TBili  0.5    /  DBili  x      /  AST  25     /  ALT  16     /  AlkPhos  48     20 Mar 2017 07:26    PT/INR - ( 20 Mar 2017 07:26 )   PT: 11.9 sec;   INR: 1.08 ratio         PTT - ( 20 Mar 2017 07:26 )  PTT:26.6 sec    Vitamin D, 25-Hydroxy: 62.5 ng/mL (03-21 @ 07:36)  Intact PTH: 36 pg/mL (03-21 @ 07:36)  Magnesium, Serum: 2.2 mg/dL (03-21 @ 05:00)      RADIOLOGY & ADDITIONAL TESTS:    PROCEDURE DATE:  03/21/2017        INTERPRETATION:  History: Renal failure.    Both kidneys are normal in size but increased in echogenicity. No   evidence of hydronephrosis.    Right kidney measures 8.9 cm in length.   Left kidney measures 9.2 cm in length.      Upper pole cortical cysts in the right kidney measures 1.2 x 1.4 x 1.3   cm.  Exophytic well-circumscribed hypoechoic nodule or complex cyst noted in   the left kidney measuring 2.1 x 1.8 x1.8 cm.    Urinary bladder was unremarkable. No evidence of intrinsic or extrinsic   mass. Bladder wall is normal. No evidence of calculi.         Impression: Bilateral increased echogenicity in both kidneys consistent   with medical renal disease    Sonolucent cyst upper pole right kidney measuring 1.4 cm.    Complex nodule or cyst with internal echoes in left kidney. CT study of   the abdomen with contrast recommended for further evaluation

## 2017-03-21 NOTE — PROGRESS NOTE ADULT - SUBJECTIVE AND OBJECTIVE BOX
TRANSESOPHAGEAL ECHOCARDIOGRAM (Prelim Note)    After risks and benefits of procedures were explained informed consent was obtained and placed in chart.   JOHN was performed.  Anesthesia present to administer sedation.  Patient tolerated the procedure well without complication.      Findings:  No cardiac mass, vegetations, or thrombus  visualized. Small PFO with left to righ shunt on Color Doppler.   LA =   Dilated. LULI: No thrombus. normal emptying velocities.  Small  PFO with left to right shunt.  LV= Normal size and function.  Severe LVH. LV ejection fraction:  55 %  RA=dilated.  RV: Normal size and function.   Mitral valve: moderate  regurgitation. partial flail posterior leaflet. Very eccentric MR jet.    MR volume= Cannot be evaluated due to eccentricity.         Aortic valve:  Critical aortic stenosis.    Aortic : moderate regurgitation.   Tricuspid Valve: Mild tricuspid regurgitation. Pulmonary artery systolic pressure =59  mm Hg. Moderate Pulm HTN   Small pericardial effusion.   Atherosclerosis of aorta : severe atherosclerosis of arch and descending aorta.   Annular diameter: long axis: 2.4 cm. Short axis: mean 2.38 cm.. Minor:  2.28 cm and Major:  2.48 cm. Area: 4.68 cmsq.   Circumference: 7.7 cm.  Moderate annular calcification. MOderate aortic calcification. Severe valve calcification.   Based on BSA and Calcification: 26 mm Olson and 29 mm Core valve is recommended.     Full report to follow.    Nata Luke MD, [facility]  [facility]  [facility]

## 2017-03-21 NOTE — PROGRESS NOTE ADULT - SUBJECTIVE AND OBJECTIVE BOX
Subjective: "They kept me waiting in the parr a long time after my procedure." Pt. lying in bed, denies any SOB or chest pain, NAD noted.    Tele: Sinus rhythm with brief episodes of A-fib overnight.  Vital Signs Last 24 Hrs  T(C): 36.8, Max: 36.8 (03-21 @ 10:00)  T(F): 98.3, Max: 98.3 (03-21 @ 10:00)  HR: 69 (69 - 100)  BP: 106/60 (98/64 - 124/68)  RR: 20 (16 - 20)  SpO2: 100% (98% - 100%)                  LV EF:65%    21 Mar 2017 05:00    140    |  103    |  79.0   ----------------------------<  96     4.0     |  24.0   |  2.18     Ca    8.5        21 Mar 2017 05:00  Mg     2.2       21 Mar 2017 05:00    TPro  7.7    /  Alb  3.9    /  TBili  0.5    /  DBili  x      /  AST  25     /  ALT  16     /  AlkPhos  48     20 Mar 2017 07:26                             9.7    7.2   )-----------( 162      ( 21 Mar 2017 04:57 )             29.2       PT/INR - ( 20 Mar 2017 07:26 )   PT: 11.9 sec;   INR: 1.08 ratio         PTT - ( 20 Mar 2017 07:26 )  PTT:26.6 sec        CAPILLARY BLOOD GLUCOSE            CXR:PROCEDURE DATE:  03/21/2017        INTERPRETATION:  HISTORY:  Shortness of breath with severe aortic   stenosis today  TECHNIQUE: Portable frontal view of the chest, 1 view.  COMPARISON: March 20, 2017.  FINDINGS:     HEART:difficult to access in this projection  LUNGS: free of consolidation or effusion.    OSSEOUS STRUCTURES:: degenerative changes    IMPRESSION:   No infiltrates.    I&O's Detail  I & Os for 24h ending 21 Mar 2017 07:00  =============================================  IN:    Oral Fluid: 120 ml    Total IN: 120 ml  ---------------------------------------------  OUT:    Total OUT: 0 ml  ---------------------------------------------  Total NET: 120 ml    I & Os for current day (as of 21 Mar 2017 17:55)  =============================================  IN:    Total IN: 0 ml  ---------------------------------------------  OUT:    Voided: 1000 ml    Total OUT: 1000 ml  ---------------------------------------------  Total NET: -1000 ml      BOWEL MOVEMENT:  [ ] YES [x] NO      MEDICATIONS  (STANDING):  sodium chloride 0.9% lock flush 3milliLiter(s) IV Push every 8 hours  aspirin enteric coated 81milliGRAM(s) Oral daily  calcium carbonate 500 mG (Tums) Chewable 1Tablet(s) Chew daily  clopidogrel Tablet 75milliGRAM(s) Oral daily  ATENolol  Tablet 50milliGRAM(s) Oral every 12 hours  furosemide    Tablet 20milliGRAM(s) Oral daily  timolol 0.5% Solution 1Drop(s) Left EYE two times a day  multivitamin 1Tablet(s) Oral daily  ascorbic acid 500milliGRAM(s) Oral daily  epoetin audelia Injectable 6000Unit(s) SubCutaneous <User Schedule>    MEDICATIONS  (PRN):  ALPRAZolam 0.125milliGRAM(s) Oral at bedtime PRN insomnia/anxiety      Physical Exam:  Neuro: A&Ox4, no focal deficits noted. Right eye with subconjuctival hemorrhage & suborbital ecchymosis noted.  Pulm: Clear bilaterally.  CV: RRR, +S1, +S2, III/VI systolic murmur  Abd: soft, non-tender, non-distended, +BS, +BM 3/19/17.  Extremities: MAEx4, no edema noted, +PP, - calf tenderness.    PAST MEDICAL & SURGICAL HISTORY:  CKD (chronic kidney disease) stage 4, GFR 15-29 ml/min  Urine frequency  NSTEMI (non-ST elevated myocardial infarction)  Atrial fibrillation  HTN (hypertension)  Orthopnea  CLIFTON (dyspnea on exertion)  CHF (congestive heart failure)  Bruises easily  Aortic stenosis  Anxiety  H/O abdominal hysterectomy  History of cholecystectomy

## 2017-03-22 DIAGNOSIS — I50.9 HEART FAILURE, UNSPECIFIED: ICD-10-CM

## 2017-03-22 DIAGNOSIS — I35.0 NONRHEUMATIC AORTIC (VALVE) STENOSIS: ICD-10-CM

## 2017-03-22 LAB
ABO RH CONFIRMATION: SIGNIFICANT CHANGE UP
ALBUMIN SERPL ELPH-MCNC: 3 G/DL — LOW (ref 3.3–5.2)
ALBUMIN SERPL ELPH-MCNC: 3 G/DL — LOW (ref 3.3–5.2)
ALP SERPL-CCNC: 29 U/L — LOW (ref 40–120)
ALP SERPL-CCNC: 29 U/L — LOW (ref 40–120)
ALT FLD-CCNC: 12 U/L — SIGNIFICANT CHANGE UP
ALT FLD-CCNC: 12 U/L — SIGNIFICANT CHANGE UP
ANION GAP SERPL CALC-SCNC: 15 MMOL/L — SIGNIFICANT CHANGE UP (ref 5–17)
AST SERPL-CCNC: 16 U/L — SIGNIFICANT CHANGE UP
AST SERPL-CCNC: 16 U/L — SIGNIFICANT CHANGE UP
BILIRUB DIRECT SERPL-MCNC: 0.1 MG/DL — SIGNIFICANT CHANGE UP (ref 0–0.3)
BILIRUB INDIRECT FLD-MCNC: 0.3 MG/DL — SIGNIFICANT CHANGE UP (ref 0.2–1)
BILIRUB SERPL-MCNC: 0.4 MG/DL — SIGNIFICANT CHANGE UP (ref 0.4–2)
BILIRUB SERPL-MCNC: 0.4 MG/DL — SIGNIFICANT CHANGE UP (ref 0.4–2)
BLD GP AB SCN SERPL QL: SIGNIFICANT CHANGE UP
BUN SERPL-MCNC: 77 MG/DL — HIGH (ref 8–20)
CALCIUM SERPL-MCNC: 8.2 MG/DL — LOW (ref 8.6–10.2)
CHLORIDE SERPL-SCNC: 106 MMOL/L — SIGNIFICANT CHANGE UP (ref 98–107)
CO2 SERPL-SCNC: 22 MMOL/L — SIGNIFICANT CHANGE UP (ref 22–29)
CREAT SERPL-MCNC: 2.06 MG/DL — HIGH (ref 0.5–1.3)
FERRITIN SERPL-MCNC: 123 NG/ML — SIGNIFICANT CHANGE UP (ref 11–306)
GLUCOSE SERPL-MCNC: 87 MG/DL — SIGNIFICANT CHANGE UP (ref 70–115)
HBA1C BLD-MCNC: 5 % — SIGNIFICANT CHANGE UP (ref 4–5.6)
HCT VFR BLD CALC: 29.4 % — LOW (ref 37–47)
HGB BLD-MCNC: 9.6 G/DL — LOW (ref 12–16)
IRON SATN MFR SERPL: 13 % — LOW (ref 14–50)
IRON SATN MFR SERPL: 29 UG/DL — LOW (ref 37–145)
MAGNESIUM SERPL-MCNC: 2.1 MG/DL — SIGNIFICANT CHANGE UP (ref 1.8–2.5)
MCHC RBC-ENTMCNC: 31.7 PG — HIGH (ref 27–31)
MCHC RBC-ENTMCNC: 32.7 G/DL — SIGNIFICANT CHANGE UP (ref 32–36)
MCV RBC AUTO: 97 FL — SIGNIFICANT CHANGE UP (ref 81–99)
PHOSPHATE SERPL-MCNC: 3.9 MG/DL — SIGNIFICANT CHANGE UP (ref 2.4–4.7)
PLATELET # BLD AUTO: 183 K/UL — SIGNIFICANT CHANGE UP (ref 150–400)
POTASSIUM SERPL-MCNC: 4.1 MMOL/L — SIGNIFICANT CHANGE UP (ref 3.5–5.3)
POTASSIUM SERPL-SCNC: 4.1 MMOL/L — SIGNIFICANT CHANGE UP (ref 3.5–5.3)
PREALB SERPL-MCNC: 16 MG/DL — LOW (ref 18–38)
PROT SERPL-MCNC: 6.3 G/DL — LOW (ref 6.6–8.7)
PROT SERPL-MCNC: 6.3 G/DL — LOW (ref 6.6–8.7)
RBC # BLD: 3.03 M/UL — LOW (ref 4.4–5.2)
RBC # FLD: 12.7 % — SIGNIFICANT CHANGE UP (ref 11–15.6)
SODIUM SERPL-SCNC: 143 MMOL/L — SIGNIFICANT CHANGE UP (ref 135–145)
TIBC SERPL-MCNC: 217 UG/DL — LOW (ref 220–430)
TRANSFERRIN SERPL-MCNC: 152 MG/DL — LOW (ref 192–382)
TYPE + AB SCN PNL BLD: SIGNIFICANT CHANGE UP
WBC # BLD: 6.7 K/UL — SIGNIFICANT CHANGE UP (ref 4.8–10.8)
WBC # FLD AUTO: 6.7 K/UL — SIGNIFICANT CHANGE UP (ref 4.8–10.8)

## 2017-03-22 PROCEDURE — 93010 ELECTROCARDIOGRAM REPORT: CPT

## 2017-03-22 PROCEDURE — 99233 SBSQ HOSP IP/OBS HIGH 50: CPT

## 2017-03-22 PROCEDURE — 74176 CT ABD & PELVIS W/O CONTRAST: CPT | Mod: 26

## 2017-03-22 PROCEDURE — 99232 SBSQ HOSP IP/OBS MODERATE 35: CPT

## 2017-03-22 PROCEDURE — 71250 CT THORAX DX C-: CPT | Mod: 26

## 2017-03-22 PROCEDURE — 71010: CPT | Mod: 26

## 2017-03-22 RX ORDER — CHLORHEXIDINE GLUCONATE 213 G/1000ML
1 SOLUTION TOPICAL
Qty: 0 | Refills: 0 | Status: DISCONTINUED | OUTPATIENT
Start: 2017-03-23 | End: 2017-03-24

## 2017-03-22 RX ORDER — IRON SUCROSE 20 MG/ML
100 INJECTION, SOLUTION INTRAVENOUS DAILY
Qty: 0 | Refills: 0 | Status: DISCONTINUED | OUTPATIENT
Start: 2017-03-22 | End: 2017-03-24

## 2017-03-22 RX ORDER — CHLORHEXIDINE GLUCONATE 213 G/1000ML
15 SOLUTION TOPICAL
Qty: 0 | Refills: 0 | Status: DISCONTINUED | OUTPATIENT
Start: 2017-03-22 | End: 2017-03-24

## 2017-03-22 RX ORDER — HEPARIN SODIUM 5000 [USP'U]/ML
500 INJECTION INTRAVENOUS; SUBCUTANEOUS
Qty: 25000 | Refills: 0 | Status: DISCONTINUED | OUTPATIENT
Start: 2017-03-22 | End: 2017-03-22

## 2017-03-22 RX ADMIN — Medication 1 DROP(S): at 18:34

## 2017-03-22 RX ADMIN — Medication 81 MILLIGRAM(S): at 12:37

## 2017-03-22 RX ADMIN — Medication 20 MILLIGRAM(S): at 06:41

## 2017-03-22 RX ADMIN — Medication 1 TABLET(S): at 12:37

## 2017-03-22 RX ADMIN — Medication 1 DROP(S): at 06:41

## 2017-03-22 RX ADMIN — ATENOLOL 50 MILLIGRAM(S): 25 TABLET ORAL at 18:34

## 2017-03-22 RX ADMIN — Medication 500 MILLIGRAM(S): at 12:37

## 2017-03-22 RX ADMIN — SODIUM CHLORIDE 3 MILLILITER(S): 9 INJECTION INTRAMUSCULAR; INTRAVENOUS; SUBCUTANEOUS at 06:37

## 2017-03-22 RX ADMIN — SODIUM CHLORIDE 3 MILLILITER(S): 9 INJECTION INTRAMUSCULAR; INTRAVENOUS; SUBCUTANEOUS at 14:59

## 2017-03-22 RX ADMIN — Medication 0.12 MILLIGRAM(S): at 22:28

## 2017-03-22 RX ADMIN — IRON SUCROSE 210 MILLIGRAM(S): 20 INJECTION, SOLUTION INTRAVENOUS at 18:34

## 2017-03-22 RX ADMIN — CLOPIDOGREL BISULFATE 75 MILLIGRAM(S): 75 TABLET, FILM COATED ORAL at 12:37

## 2017-03-22 RX ADMIN — CHLORHEXIDINE GLUCONATE 15 MILLILITER(S): 213 SOLUTION TOPICAL at 21:32

## 2017-03-22 RX ADMIN — SODIUM CHLORIDE 3 MILLILITER(S): 9 INJECTION INTRAMUSCULAR; INTRAVENOUS; SUBCUTANEOUS at 21:32

## 2017-03-22 NOTE — PROGRESS NOTE ADULT - SUBJECTIVE AND OBJECTIVE BOX
CC: shortness of breath.     INTERVAL HISTORY: significant improvement in symptoms.     MEDICATIONS  (STANDING):  sodium chloride 0.9% lock flush 3milliLiter(s) IV Push every 8 hours  aspirin enteric coated 81milliGRAM(s) Oral daily  calcium carbonate 500 mG (Tums) Chewable 1Tablet(s) Chew daily  ATENolol  Tablet 50milliGRAM(s) Oral every 12 hours  furosemide    Tablet 20milliGRAM(s) Oral daily  timolol 0.5% Solution 1Drop(s) Left EYE two times a day  multivitamin 1Tablet(s) Oral daily  ascorbic acid 500milliGRAM(s) Oral daily  epoetin audelia Injectable 6000Unit(s) SubCutaneous <User Schedule>  iron sucrose IVPB 100milliGRAM(s) IV Intermittent daily  chlorhexidine 0.12% Liquid 15milliLiter(s) Swish and Spit two times a day  heparin  Infusion 500Unit(s)/Hr IV Continuous <Continuous>    ROS: All others negative     PHYSICAL EXAM:  T(C): 36.4, Max: 36.8 (03-21 @ 22:45)  HR: 71 (62 - 72)  BP: 114/58 (98/58 - 114/58)  RR: 17 (16 - 17)  SpO2: 98% (95% - 99%)  Wt(kg): --  I&O's Summary  I & Os for 24h ending 22 Mar 2017 07:00  =============================================  IN: 240 ml / OUT: 1350 ml / NET: -1110 ml    I & Os for current day (as of 22 Mar 2017 18:14)  =============================================  IN: 240 ml / OUT: 0 ml / NET: 240 ml      Appearance: Normal	  HEENT:   right eye.   Lymphatic: No lymphadenopathy  Cardiovascular: III/VI LUSB.   Respiratory: Lungs clear to auscultation	  Psychiatry: A & O x 3, Mood & affect appropriate  Gastrointestinal:  Soft, Non-tender, + BS	  Skin: No rashes, No ecchymoses, No cyanosis  Neurologic: Non-focal  Extremities: Normal range of motion, No clubbing, cyanosis or edema  Vascular: Peripheral pulses palpable 2+ bilaterally    LABS:	 	                        9.6    6.7   )-----------( 183      ( 22 Mar 2017 05:05 )             29.4     22 Mar 2017 05:05    143    |  106    |  77.0   ----------------------------<  87     4.1     |  22.0   |  2.06     Ca    8.2        22 Mar 2017 05:05  Phos  3.9       22 Mar 2017 05:05  Mg     2.1       22 Mar 2017 05:05    TPro  6.3    /  Alb  3.0    /  TBili  0.4    /  DBili  0.1    /  AST  16     /  ALT  12     /  AlkPhos  29     22 Mar 2017 05:05

## 2017-03-22 NOTE — PROGRESS NOTE ADULT - SUBJECTIVE AND OBJECTIVE BOX
addendum    Spoke with Dr Anne.  Plan to D/C plavix and start Heparin gtt. Will follow up PTT in 6 hours

## 2017-03-22 NOTE — PROGRESS NOTE ADULT - SUBJECTIVE AND OBJECTIVE BOX
Subjective: "  When is my test going to be done?"  Pt in bed NAD    VITAL SIGNS  T(C): 36.4, Max: 36.8 (03-21 @ 17:55)  HR: 70 (62 - 72)  BP: 112/44 (98/58 - 112/44)  RR: 17 (16 - 18)   SpO2: 98% (95% - 99%) RA  Wt(kg): --   on (O2)            Daily     Daily Weight in k.4 (22 Mar 2017 05:45)  Admit Wt: Drug Dosing Weight  Height (cm): 152.4 (20 Mar 2017 08:05)  Weight (kg): 50.8 (20 Mar 2017 08:05)  Telemetry:  SR  LVEF:  65%    MEDICATIONS  sodium chloride 0.9% lock flush 3milliLiter(s) IV Push every 8 hours  aspirin enteric coated 81milliGRAM(s) Oral daily  calcium carbonate 500 mG (Tums) Chewable 1Tablet(s) Chew daily  clopidogrel Tablet 75milliGRAM(s) Oral daily  ALPRAZolam 0.125milliGRAM(s) Oral at bedtime PRN  ATENolol  Tablet 50milliGRAM(s) Oral every 12 hours  furosemide    Tablet 20milliGRAM(s) Oral daily  timolol 0.5% Solution 1Drop(s) Left EYE two times a day  multivitamin 1Tablet(s) Oral daily  ascorbic acid 500milliGRAM(s) Oral daily  epoetin audelia Injectable 6000Unit(s) SubCutaneous <User Schedule>  iron sucrose IVPB 100milliGRAM(s) IV Intermittent daily  chlorhexidine 0.12% Liquid 15milliLiter(s) Swish and Spit two times a day    MEDICATIONS  (PRN):  ALPRAZolam 0.125milliGRAM(s) Oral at bedtime PRN insomnia/anxiety        PHYSICAL EXAM  Neuro: A&Ox4, no focal deficits noted. Right eye with subconjuctival hemorrhage & suborbital ecchymosis noted.  Pulm: Clear bilaterally.  CV: RRR, +S1, +S2, III/VI systolic murmur  Abd: soft, non-tender, non-distended, +BS, +BM 3/19/17.  Extremities: MAEx4, no edema noted, +PP, - calf tenderness.      I&O's Detail  I & Os for 24h ending 22 Mar 2017 07:00  =============================================  IN:    Oral Fluid: 240 ml    Total IN: 240 ml  ---------------------------------------------  OUT:    Voided: 1350 ml    Total OUT: 1350 ml  ---------------------------------------------  Total NET: -1110 ml    I & Os for current day (as of 22 Mar 2017 16:25)  =============================================  IN:    Oral Fluid: 240 ml    Total IN: 240 ml  ---------------------------------------------  OUT:    Total OUT: 0 ml  ---------------------------------------------  Total NET: 240 ml      LABS  22 Mar 2017 05:05    143    |  106    |  77.0   ----------------------------<  87     4.1     |  22.0   |  2.06     Ca    8.2        22 Mar 2017 05:05  Phos  3.9       22 Mar 2017 05:05  Mg     2.1       22 Mar 2017 05:05    TPro  6.3    /  Alb  3.0    /  TBili  0.4    /  DBili  0.1    /  AST  16     /  ALT  12     /  AlkPhos  29     22 Mar 2017 05:05                                 9.6    6.7   )-----------( 183      ( 22 Mar 2017 05:05 )             29.4                LIVER FUNCTIONS - ( 22 Mar 2017 05:05 )  Alb: 3.0 g/dL / Pro: 6.3 g/dL / ALK PHOS: 29 U/L / ALT: 12 U/L / AST: 16 U/L / GGT: x              CAPILLARY BLOOD GLUCOSE           Today's CXR:  echnique: A single AP view of the chest was obtained.    Comparison exam: 3/21/2017 1:49 AM.    Findings:  Persistent cardiomegaly. Small right pleural effusion, unchanged. The   lungs are clear of acute infiltrate. No evidence of pneumothorax..    Impression:  Stable exam without significant change since the previous study..    CT C/A/P  3/22:  FINDINGS:    There is a 2 cm nodule in the right thyroid lobe.    No evidence of mediastinal or hilar lymphadenopathy. There are small   bilateral pleural effusions. There is a small pericardial effusion.    Hiatus hernia is noted.    Bibasilar atelectasis is noted, right greater than left. The lungs are   otherwise clear.    The liver is unremarkable. Status post cholecystectomy. The spleen is not   enlarged and demonstrates no focal abnormality.    The pancreatic contour is unremarkable without evidence of mass,   inflammation or ductal dilatation. The adrenal glandsdemonstrate normal   size and contour.    Right renal simple and complicated cysts. The left kidney also   demonstrates simple and complicated cortical cysts. No evidence of   hydronephrosis.    There are extensive atherosclerotic changes in the aorta extending from   the aortic arch through the bifurcation. No evidence of aneurysm.    No evidence of retroperitoneal or pelvic lymphadenopathy.    The bladder is unremarkable. No evidence of pelvic mass.    Colonic diverticulosis is noted. There is a focal region of inflammation   along the medial wall of the distal descending colon adjacent to a   diverticulum. Findings are consistent with diverticulitis of the distal   descending colon.    There are degenerative changes in the spine.      PAST MEDICAL & SURGICAL HISTORY:  CKD (chronic kidney disease) stage 4, GFR 15-29 ml/min  Urine frequency  NSTEMI (non-ST elevated myocardial infarction)  Atrial fibrillation  HTN (hypertension)  Orthopnea  CLIFTON (dyspnea on exertion)  CHF (congestive heart failure)  Bruises easily  Aortic stenosis  Anxiety  H/O abdominal hysterectomy  History of cholecystectomy

## 2017-03-23 LAB
ANION GAP SERPL CALC-SCNC: 13 MMOL/L — SIGNIFICANT CHANGE UP (ref 5–17)
APTT BLD: 30.2 SEC — SIGNIFICANT CHANGE UP (ref 27.5–37.4)
APTT BLD: 32.3 SEC — SIGNIFICANT CHANGE UP (ref 27.5–37.4)
BUN SERPL-MCNC: 76 MG/DL — HIGH (ref 8–20)
CALCIUM SERPL-MCNC: 8.4 MG/DL — LOW (ref 8.6–10.2)
CHLORIDE SERPL-SCNC: 109 MMOL/L — HIGH (ref 98–107)
CO2 SERPL-SCNC: 24 MMOL/L — SIGNIFICANT CHANGE UP (ref 22–29)
CREAT SERPL-MCNC: 1.85 MG/DL — HIGH (ref 0.5–1.3)
GLUCOSE SERPL-MCNC: 93 MG/DL — SIGNIFICANT CHANGE UP (ref 70–115)
HCT VFR BLD CALC: 29.7 % — LOW (ref 37–47)
HGB BLD-MCNC: 9.8 G/DL — LOW (ref 12–16)
MAGNESIUM SERPL-MCNC: 2.6 MG/DL — HIGH (ref 1.8–2.5)
MCHC RBC-ENTMCNC: 31.8 PG — HIGH (ref 27–31)
MCHC RBC-ENTMCNC: 33 G/DL — SIGNIFICANT CHANGE UP (ref 32–36)
MCV RBC AUTO: 96.4 FL — SIGNIFICANT CHANGE UP (ref 81–99)
PLATELET # BLD AUTO: 187 K/UL — SIGNIFICANT CHANGE UP (ref 150–400)
POTASSIUM SERPL-MCNC: 4 MMOL/L — SIGNIFICANT CHANGE UP (ref 3.5–5.3)
POTASSIUM SERPL-SCNC: 4 MMOL/L — SIGNIFICANT CHANGE UP (ref 3.5–5.3)
RBC # BLD: 3.08 M/UL — LOW (ref 4.4–5.2)
RBC # FLD: 12.8 % — SIGNIFICANT CHANGE UP (ref 11–15.6)
SODIUM SERPL-SCNC: 146 MMOL/L — HIGH (ref 135–145)
WBC # BLD: 6.2 K/UL — SIGNIFICANT CHANGE UP (ref 4.8–10.8)
WBC # FLD AUTO: 6.2 K/UL — SIGNIFICANT CHANGE UP (ref 4.8–10.8)

## 2017-03-23 PROCEDURE — 71010: CPT | Mod: 26

## 2017-03-23 RX ORDER — CEFUROXIME AXETIL 250 MG
750 TABLET ORAL ONCE
Qty: 0 | Refills: 0 | Status: DISCONTINUED | OUTPATIENT
Start: 2017-03-24 | End: 2017-03-24

## 2017-03-23 RX ADMIN — Medication 1 DROP(S): at 05:55

## 2017-03-23 RX ADMIN — ATENOLOL 50 MILLIGRAM(S): 25 TABLET ORAL at 05:55

## 2017-03-23 RX ADMIN — Medication 0.12 MILLIGRAM(S): at 23:21

## 2017-03-23 RX ADMIN — CHLORHEXIDINE GLUCONATE 15 MILLILITER(S): 213 SOLUTION TOPICAL at 11:31

## 2017-03-23 RX ADMIN — Medication 20 MILLIGRAM(S): at 05:55

## 2017-03-23 RX ADMIN — Medication 1 TABLET(S): at 11:31

## 2017-03-23 RX ADMIN — IRON SUCROSE 210 MILLIGRAM(S): 20 INJECTION, SOLUTION INTRAVENOUS at 11:31

## 2017-03-23 RX ADMIN — CHLORHEXIDINE GLUCONATE 1 APPLICATION(S): 213 SOLUTION TOPICAL at 10:30

## 2017-03-23 RX ADMIN — ERYTHROPOIETIN 6000 UNIT(S): 10000 INJECTION, SOLUTION INTRAVENOUS; SUBCUTANEOUS at 15:58

## 2017-03-23 RX ADMIN — CHLORHEXIDINE GLUCONATE 1 APPLICATION(S): 213 SOLUTION TOPICAL at 22:00

## 2017-03-23 RX ADMIN — Medication 500 MILLIGRAM(S): at 11:32

## 2017-03-23 RX ADMIN — SODIUM CHLORIDE 3 MILLILITER(S): 9 INJECTION INTRAMUSCULAR; INTRAVENOUS; SUBCUTANEOUS at 05:57

## 2017-03-23 RX ADMIN — SODIUM CHLORIDE 3 MILLILITER(S): 9 INJECTION INTRAMUSCULAR; INTRAVENOUS; SUBCUTANEOUS at 21:50

## 2017-03-23 RX ADMIN — CHLORHEXIDINE GLUCONATE 15 MILLILITER(S): 213 SOLUTION TOPICAL at 23:22

## 2017-03-23 RX ADMIN — Medication 81 MILLIGRAM(S): at 11:32

## 2017-03-23 RX ADMIN — ATENOLOL 50 MILLIGRAM(S): 25 TABLET ORAL at 18:07

## 2017-03-23 RX ADMIN — SODIUM CHLORIDE 3 MILLILITER(S): 9 INJECTION INTRAMUSCULAR; INTRAVENOUS; SUBCUTANEOUS at 13:19

## 2017-03-23 NOTE — PROGRESS NOTE ADULT - SUBJECTIVE AND OBJECTIVE BOX
Cardiac Surgery Pre-op Note:  89 year old female with PMH of known severe AS, non-obstructive CAD, HTN, NSTEMI, stage 4 CKD, chronic diastolic heart failure, HTN, PAF (only on aspirin and plavix, no AC), bruises easily. Pt presented to ED on 3/20/17 with complaints of worsening dyspnea on exertion over past few days. Pt also reported one episode of urinary incontinence and dizziness a few days ago which self-resolved.                                                                                                                     Surgeon:    Procedure:    Allergies    Toprol-XL (Other; Short breath; Hypotension)    Intolerances            PAST MEDICAL & SURGICAL HISTORY:  CKD (chronic kidney disease) stage 4, GFR 15-29 ml/min  Urine frequency  NSTEMI (non-ST elevated myocardial infarction)  Atrial fibrillation  HTN (hypertension)  Orthopnea  CLIFTON (dyspnea on exertion)  CHF (congestive heart failure)  Bruises easily  Aortic stenosis  Anxiety  H/O abdominal hysterectomy  History of cholecystectomy      MEDICATIONS  (STANDING):  sodium chloride 0.9% lock flush 3milliLiter(s) IV Push every 8 hours  aspirin enteric coated 81milliGRAM(s) Oral daily  calcium carbonate 500 mG (Tums) Chewable 1Tablet(s) Chew daily  ATENolol  Tablet 50milliGRAM(s) Oral every 12 hours  furosemide    Tablet 20milliGRAM(s) Oral daily  timolol 0.5% Solution 1Drop(s) Left EYE two times a day  multivitamin 1Tablet(s) Oral daily  ascorbic acid 500milliGRAM(s) Oral daily  epoetin audelia Injectable 6000Unit(s) SubCutaneous <User Schedule>  iron sucrose IVPB 100milliGRAM(s) IV Intermittent daily  chlorhexidine 0.12% Liquid 15milliLiter(s) Swish and Spit two times a day  chlorhexidine 4% Liquid 1Application(s) Topical two times a day    MEDICATIONS  (PRN):  ALPRAZolam 0.125milliGRAM(s) Oral at bedtime PRN insomnia/anxiety        Labs:                        9.8    6.2   )-----------( 187      ( 23 Mar 2017 06:25 )             29.7     23 Mar 2017 06:25    146    |  109    |  76.0   ----------------------------<  93     4.0     |  24.0   |  1.85     Ca    8.4        23 Mar 2017 06:25  Phos  3.9       22 Mar 2017 05:05  Mg     2.6       23 Mar 2017 06:25    TPro  6.3    /  Alb  3.0    /  TBili  0.4    /  DBili  0.1    /  AST  16     /  ALT  12     /  AlkPhos  29     22 Mar 2017 05:05    PTT - ( 23 Mar 2017 06:24 )  PTT:30.2 sec  LIVER FUNCTIONS - ( 22 Mar 2017 05:05 )  Alb: 3.0 g/dL / Pro: 6.3 g/dL / ALK PHOS: 29 U/L / ALT: 12 U/L / AST: 16 U/L / GGT: x           Urinalysis Basic - ( 21 Mar 2017 20:48 )    Color: Yellow / Appearance: Clear / S.015 / pH: x  Gluc: x / Ketone: Negative  / Bili: Negative / Urobili: Negative mg/dL   Blood: x / Protein: Negative mg/dL / Nitrite: Negative   Leuk Esterase: Trace / RBC: 0-2 /HPF / WBC 3-5   Sq Epi: x / Non Sq Epi: Few / Bacteria: Occasional       Hemoglobin A1C, Whole Blood: 5.0 % ( @ 05:10)  Prealbumin, Serum: 16 mg/dL ( @ 05:05)  MRSA PCR Result.: NotDetec ( @ 18:53)  MSSA PCR Result.: NotDetec ( @ 18:53)    Prealbumin, Serum: 16 mg/dL ( @ 05:05)      MRSA PCR Result.: NotDetec ( @ 18:53)  MSSA PCR Result.: NotDetec ( @ 18:53)                CXR: PROCEDURE DATE:  2017        INTERPRETATION:  History: CHF. Follow-up study. Dyspnea    Technique:  AP portable    Comparisons:  Chest x-ray dated 3/22/2017    Findings: Small bilateral pleural effusions at lung bases. Prominent   central pulmonary arteries and congestion. Peripheral lung fields are   clear. Aorta is tortuous and calcified . Heart size is unremarkable. The   thorax is normal for age.    Impression: Central pulmonic congestion unchanged. Small bilateral   pleural effusions        EKG:Ventricular Rate 67 BPM    Atrial Rate 67 BPM    P-R Interval 172 ms    QRS Duration 94 ms    Q-T Interval 418 ms    QTC Calculation(Bezet) 441 ms    P Axis 61 degrees    R Axis -15 degrees    T Axis 133 degrees    Diagnosis Line Normal sinus rhythm  Possible Left atrial enlargement  Left ventricular hypertrophy with repolarization abnormality  Abnormal ECG    Carotid Duplex:There is no evidence of increased peak systolic velocities or flow   alterations to suggest stenosis in the above mentioned arteries   bilaterally.  Flow in the vertebral arteries was antegrade.    IMPRESSION: Moderate plaque without a hemodynamic abnormality.      PFT's: FVC 1.71 Best: 1.11 %Prd:65% FEV1 1.26 Best .69 %Prd 54%    Echo:Summary:   1. (+)PFO. No cardiac mass, vegetations, or thrombus visualized.   2. Severely enlarged left atrium.   3. No left atrial or left atrial appendage thrombus visualized. Left   atrial appendage enlargement and normal left atrial appendage velocities.   Small PFO with predominantly left to right shunt.   4. There is moderate concentric left ventricular hypertrophy.   5. Left ventricular ejection fraction, by visual estimation, is 60 to   65%.   6.Mildly dilated right atrium.   7. Normal right ventricular size and function.   8. Moderate mitral valve regurgitation. Critical aortic stenosis (MARÍA =   0.4 cmsq).   9. Moderate tricuspid regurgitation.  10. Estimated pulmonary artery systolic pressure is 69.0 mmHg -severe   pulmonary hypertension.  11. Trivial pericardial effusion.  12. Conclusion: Critical aortic valve stenosis. MARÍA = 0.4 cmsq. Based on   annular measurement, calcification and body surface area, recommend: 26   mm Olson 2nd generation; 26 mm Olson 3rd generation; 29 mm Core valve.      Concerns/precautions:Prominent Septal bulge, small and hypertrophic   LV cavity. Placement of the LV wire in the apex. Marked atherosclerosis   of the descending and ascending aortic calcification.    Cath report:EF of 55 %.  CORONARY VESSELS: The coronary circulation is right dominant.  LM:   --  LM: Inferior takeoff with 20-30% disease. No gradient with  catheter engagement.  LAD:   --  Mid LAD: There was a 50 % stenosis. The lesion was moderately  calcified.  --  D1: There was a 40 % stenosis.  CX:   --  Circumflex: There was a 20 % stenosis.  RCA:   --  Mid RCA: There was a 30 % stenosis.  COMPLICATIONS: No complications occurred during the cath lab visit.  DIAGNOSTIC IMPRESSIONS: Mild to moderate CAD.  Peripheral IVUS performed to assess for vessel sizing for potential  transfemoral TAVR.      Physical Exam:  Neuro: A&Ox4, no focal deficits noted. Right eye with subconjuctival hemorrhage & suborbital ecchymosis noted.  Pulm: Clear bilaterally.  CV: RRR, +S1, +S2, III/VI systolic murmur  Abd: soft, non-tender, non-distended, +BS, +BM 3/22/17.  Extremities: MAEx4, no edema noted, +PP, - calf tenderness.        Pt has AICD/PPM [ ] Yes  [x] No             Brand Name:  Pre-op Beta Blocker ordered within 24 hrs of surgery?  [x] Yes  [ ] No  If not, Why?  Pre-op Hibiclens & Peridex (BID x 2 days preferred) [x] Yes  [ ] No  Type & Cross  [x] Yes  [ ] No  NPO after Midnight [x] Yes  [ ] No  Pre-op ABX ordered, to be taped on chart:  [x] Yes  [ ] No   ( Vanco only if Anaphylaxis, or MRSA)  Consent obtained  [ ] Yes  [x] No

## 2017-03-23 NOTE — PROGRESS NOTE ADULT - SUBJECTIVE AND OBJECTIVE BOX
Renal :    For TAVR in AM,    CKD - 4 w. Anemia , PAF & CHF,    On Furosemide ,    CTS & Cardiology Evalns., Reviewed,

## 2017-03-24 ENCOUNTER — APPOINTMENT (OUTPATIENT)
Dept: CARDIOTHORACIC SURGERY | Facility: HOSPITAL | Age: 82
End: 2017-03-24

## 2017-03-24 LAB
ALBUMIN SERPL ELPH-MCNC: 2.8 G/DL — LOW (ref 3.3–5.2)
ALBUMIN SERPL ELPH-MCNC: 3.2 G/DL — LOW (ref 3.3–5.2)
ALP SERPL-CCNC: 32 U/L — LOW (ref 40–120)
ALP SERPL-CCNC: 36 U/L — LOW (ref 40–120)
ALT FLD-CCNC: 12 U/L — SIGNIFICANT CHANGE UP
ALT FLD-CCNC: 34 U/L — HIGH
ANION GAP SERPL CALC-SCNC: 14 MMOL/L — SIGNIFICANT CHANGE UP (ref 5–17)
ANION GAP SERPL CALC-SCNC: 14 MMOL/L — SIGNIFICANT CHANGE UP (ref 5–17)
APTT BLD: 30.7 SEC — SIGNIFICANT CHANGE UP (ref 27.5–37.4)
APTT BLD: 31.1 SEC — SIGNIFICANT CHANGE UP (ref 27.5–37.4)
AST SERPL-CCNC: 17 U/L — SIGNIFICANT CHANGE UP
AST SERPL-CCNC: 69 U/L — HIGH
BILIRUB DIRECT SERPL-MCNC: 0.3 MG/DL — SIGNIFICANT CHANGE UP (ref 0–0.3)
BILIRUB INDIRECT FLD-MCNC: 0.4 MG/DL — SIGNIFICANT CHANGE UP (ref 0.2–1)
BILIRUB SERPL-MCNC: 0.3 MG/DL — LOW (ref 0.4–2)
BILIRUB SERPL-MCNC: 0.7 MG/DL — SIGNIFICANT CHANGE UP (ref 0.4–2)
BUN SERPL-MCNC: 58 MG/DL — HIGH (ref 8–20)
BUN SERPL-MCNC: 65 MG/DL — HIGH (ref 8–20)
CALCIUM SERPL-MCNC: 7.5 MG/DL — LOW (ref 8.6–10.2)
CALCIUM SERPL-MCNC: 8.4 MG/DL — LOW (ref 8.6–10.2)
CHLORIDE SERPL-SCNC: 106 MMOL/L — SIGNIFICANT CHANGE UP (ref 98–107)
CHLORIDE SERPL-SCNC: 108 MMOL/L — HIGH (ref 98–107)
CK SERPL-CCNC: 73 U/L — SIGNIFICANT CHANGE UP (ref 25–170)
CO2 SERPL-SCNC: 20 MMOL/L — LOW (ref 22–29)
CO2 SERPL-SCNC: 23 MMOL/L — SIGNIFICANT CHANGE UP (ref 22–29)
CREAT SERPL-MCNC: 1.5 MG/DL — HIGH (ref 0.5–1.3)
CREAT SERPL-MCNC: 1.71 MG/DL — HIGH (ref 0.5–1.3)
GAS PNL BLDA: SIGNIFICANT CHANGE UP
GAS PNL BLDA: SIGNIFICANT CHANGE UP
GLUCOSE SERPL-MCNC: 145 MG/DL — HIGH (ref 70–115)
GLUCOSE SERPL-MCNC: 93 MG/DL — SIGNIFICANT CHANGE UP (ref 70–115)
HCT VFR BLD CALC: 30.9 % — LOW (ref 37–47)
HCT VFR BLD CALC: 32.7 % — LOW (ref 37–47)
HGB BLD-MCNC: 10 G/DL — LOW (ref 12–16)
HGB BLD-MCNC: 10.7 G/DL — LOW (ref 12–16)
INR BLD: 1.12 RATIO — SIGNIFICANT CHANGE UP (ref 0.88–1.16)
INR BLD: 1.24 RATIO — HIGH (ref 0.88–1.16)
MAGNESIUM SERPL-MCNC: 2 MG/DL — SIGNIFICANT CHANGE UP (ref 1.8–2.5)
MAGNESIUM SERPL-MCNC: 2.4 MG/DL — SIGNIFICANT CHANGE UP (ref 1.8–2.5)
MCHC RBC-ENTMCNC: 31.5 PG — HIGH (ref 27–31)
MCHC RBC-ENTMCNC: 31.6 PG — HIGH (ref 27–31)
MCHC RBC-ENTMCNC: 32.4 G/DL — SIGNIFICANT CHANGE UP (ref 32–36)
MCHC RBC-ENTMCNC: 32.7 G/DL — SIGNIFICANT CHANGE UP (ref 32–36)
MCV RBC AUTO: 96.5 FL — SIGNIFICANT CHANGE UP (ref 81–99)
MCV RBC AUTO: 97.5 FL — SIGNIFICANT CHANGE UP (ref 81–99)
PHOSPHATE SERPL-MCNC: 2.6 MG/DL — SIGNIFICANT CHANGE UP (ref 2.4–4.7)
PHOSPHATE SERPL-MCNC: 3.7 MG/DL — SIGNIFICANT CHANGE UP (ref 2.4–4.7)
PLATELET # BLD AUTO: 171 K/UL — SIGNIFICANT CHANGE UP (ref 150–400)
PLATELET # BLD AUTO: 215 K/UL — SIGNIFICANT CHANGE UP (ref 150–400)
POTASSIUM SERPL-MCNC: 4.3 MMOL/L — SIGNIFICANT CHANGE UP (ref 3.5–5.3)
POTASSIUM SERPL-MCNC: 4.4 MMOL/L — SIGNIFICANT CHANGE UP (ref 3.5–5.3)
POTASSIUM SERPL-SCNC: 4.3 MMOL/L — SIGNIFICANT CHANGE UP (ref 3.5–5.3)
POTASSIUM SERPL-SCNC: 4.4 MMOL/L — SIGNIFICANT CHANGE UP (ref 3.5–5.3)
PROT SERPL-MCNC: 6 G/DL — LOW (ref 6.6–8.7)
PROT SERPL-MCNC: 6.6 G/DL — SIGNIFICANT CHANGE UP (ref 6.6–8.7)
PROTHROM AB SERPL-ACNC: 12.4 SEC — SIGNIFICANT CHANGE UP (ref 9.8–12.7)
PROTHROM AB SERPL-ACNC: 13.7 SEC — HIGH (ref 9.8–12.7)
RBC # BLD: 3.17 M/UL — LOW (ref 4.4–5.2)
RBC # BLD: 3.39 M/UL — LOW (ref 4.4–5.2)
RBC # FLD: 13.2 % — SIGNIFICANT CHANGE UP (ref 11–15.6)
RBC # FLD: 13.4 % — SIGNIFICANT CHANGE UP (ref 11–15.6)
SODIUM SERPL-SCNC: 142 MMOL/L — SIGNIFICANT CHANGE UP (ref 135–145)
SODIUM SERPL-SCNC: 143 MMOL/L — SIGNIFICANT CHANGE UP (ref 135–145)
TROPONIN T SERPL-MCNC: 0.34 NG/ML — HIGH (ref 0–0.06)
WBC # BLD: 6.5 K/UL — SIGNIFICANT CHANGE UP (ref 4.8–10.8)
WBC # BLD: 7.8 K/UL — SIGNIFICANT CHANGE UP (ref 4.8–10.8)
WBC # FLD AUTO: 6.5 K/UL — SIGNIFICANT CHANGE UP (ref 4.8–10.8)
WBC # FLD AUTO: 7.8 K/UL — SIGNIFICANT CHANGE UP (ref 4.8–10.8)

## 2017-03-24 PROCEDURE — 71010: CPT | Mod: 26

## 2017-03-24 PROCEDURE — 99233 SBSQ HOSP IP/OBS HIGH 50: CPT

## 2017-03-24 PROCEDURE — 93355 ECHO TRANSESOPHAGEAL (TEE): CPT

## 2017-03-24 PROCEDURE — 93010 ELECTROCARDIOGRAM REPORT: CPT

## 2017-03-24 PROCEDURE — 33362 REPLACE AORTIC VALVE OPEN: CPT | Mod: 62,Q0

## 2017-03-24 RX ORDER — ERYTHROPOIETIN 10000 [IU]/ML
6000 INJECTION, SOLUTION INTRAVENOUS; SUBCUTANEOUS
Qty: 0 | Refills: 0 | Status: DISCONTINUED | OUTPATIENT
Start: 2017-03-24 | End: 2017-03-27

## 2017-03-24 RX ORDER — MEPERIDINE HYDROCHLORIDE 50 MG/ML
25 INJECTION INTRAMUSCULAR; INTRAVENOUS; SUBCUTANEOUS ONCE
Qty: 0 | Refills: 0 | Status: DISCONTINUED | OUTPATIENT
Start: 2017-03-24 | End: 2017-03-24

## 2017-03-24 RX ORDER — ASCORBIC ACID 60 MG
500 TABLET,CHEWABLE ORAL DAILY
Qty: 0 | Refills: 0 | Status: DISCONTINUED | OUTPATIENT
Start: 2017-03-24 | End: 2017-03-27

## 2017-03-24 RX ORDER — ACETAMINOPHEN 500 MG
1000 TABLET ORAL ONCE
Qty: 0 | Refills: 0 | Status: COMPLETED | OUTPATIENT
Start: 2017-03-24 | End: 2017-03-24

## 2017-03-24 RX ORDER — ASPIRIN/CALCIUM CARB/MAGNESIUM 324 MG
325 TABLET ORAL ONCE
Qty: 0 | Refills: 0 | Status: COMPLETED | OUTPATIENT
Start: 2017-03-24 | End: 2017-03-24

## 2017-03-24 RX ORDER — ALPRAZOLAM 0.25 MG
0.12 TABLET ORAL AT BEDTIME
Qty: 0 | Refills: 0 | Status: DISCONTINUED | OUTPATIENT
Start: 2017-03-24 | End: 2017-03-27

## 2017-03-24 RX ORDER — NICARDIPINE HYDROCHLORIDE 30 MG/1
5 CAPSULE, EXTENDED RELEASE ORAL
Qty: 40 | Refills: 0 | Status: DISCONTINUED | OUTPATIENT
Start: 2017-03-24 | End: 2017-03-24

## 2017-03-24 RX ORDER — IRON SUCROSE 20 MG/ML
100 INJECTION, SOLUTION INTRAVENOUS DAILY
Qty: 0 | Refills: 0 | Status: COMPLETED | OUTPATIENT
Start: 2017-03-24 | End: 2017-03-27

## 2017-03-24 RX ORDER — HYDRALAZINE HCL 50 MG
10 TABLET ORAL ONCE
Qty: 0 | Refills: 0 | Status: DISCONTINUED | OUTPATIENT
Start: 2017-03-24 | End: 2017-03-26

## 2017-03-24 RX ORDER — CALCIUM CARBONATE 500(1250)
1 TABLET ORAL DAILY
Qty: 0 | Refills: 0 | Status: DISCONTINUED | OUTPATIENT
Start: 2017-03-25 | End: 2017-03-27

## 2017-03-24 RX ORDER — TIMOLOL 0.5 %
1 DROPS OPHTHALMIC (EYE) ONCE
Qty: 0 | Refills: 0 | Status: DISCONTINUED | OUTPATIENT
Start: 2017-03-24 | End: 2017-03-24

## 2017-03-24 RX ORDER — CLOPIDOGREL BISULFATE 75 MG/1
75 TABLET, FILM COATED ORAL ONCE
Qty: 0 | Refills: 0 | Status: COMPLETED | OUTPATIENT
Start: 2017-03-24 | End: 2017-03-24

## 2017-03-24 RX ORDER — ASPIRIN/CALCIUM CARB/MAGNESIUM 324 MG
325 TABLET ORAL DAILY
Qty: 0 | Refills: 0 | Status: DISCONTINUED | OUTPATIENT
Start: 2017-03-25 | End: 2017-03-27

## 2017-03-24 RX ORDER — CHLORHEXIDINE GLUCONATE 213 G/1000ML
5 SOLUTION TOPICAL EVERY 4 HOURS
Qty: 0 | Refills: 0 | Status: DISCONTINUED | OUTPATIENT
Start: 2017-03-24 | End: 2017-03-24

## 2017-03-24 RX ORDER — CEFUROXIME AXETIL 250 MG
1500 TABLET ORAL EVERY 24 HOURS
Qty: 0 | Refills: 0 | Status: COMPLETED | OUTPATIENT
Start: 2017-03-25 | End: 2017-03-26

## 2017-03-24 RX ORDER — TIMOLOL 0.5 %
1 DROPS OPHTHALMIC (EYE) DAILY
Qty: 0 | Refills: 0 | Status: DISCONTINUED | OUTPATIENT
Start: 2017-03-24 | End: 2017-03-27

## 2017-03-24 RX ORDER — FUROSEMIDE 40 MG
20 TABLET ORAL DAILY
Qty: 0 | Refills: 0 | Status: DISCONTINUED | OUTPATIENT
Start: 2017-03-25 | End: 2017-03-27

## 2017-03-24 RX ORDER — CLOPIDOGREL BISULFATE 75 MG/1
75 TABLET, FILM COATED ORAL DAILY
Qty: 0 | Refills: 0 | Status: DISCONTINUED | OUTPATIENT
Start: 2017-03-25 | End: 2017-03-27

## 2017-03-24 RX ORDER — TIMOLOL 0.5 %
1 DROPS OPHTHALMIC (EYE)
Qty: 0 | Refills: 0 | Status: DISCONTINUED | OUTPATIENT
Start: 2017-03-24 | End: 2017-03-24

## 2017-03-24 RX ADMIN — NICARDIPINE HYDROCHLORIDE 25 MG/HR: 30 CAPSULE, EXTENDED RELEASE ORAL at 15:16

## 2017-03-24 RX ADMIN — Medication 20 MILLIGRAM(S): at 05:11

## 2017-03-24 RX ADMIN — CHLORHEXIDINE GLUCONATE 1 APPLICATION(S): 213 SOLUTION TOPICAL at 09:35

## 2017-03-24 RX ADMIN — Medication 325 MILLIGRAM(S): at 21:55

## 2017-03-24 RX ADMIN — ATENOLOL 50 MILLIGRAM(S): 25 TABLET ORAL at 05:11

## 2017-03-24 RX ADMIN — SODIUM CHLORIDE 3 MILLILITER(S): 9 INJECTION INTRAMUSCULAR; INTRAVENOUS; SUBCUTANEOUS at 05:00

## 2017-03-24 RX ADMIN — Medication 0.12 MILLIGRAM(S): at 21:55

## 2017-03-24 RX ADMIN — CLOPIDOGREL BISULFATE 75 MILLIGRAM(S): 75 TABLET, FILM COATED ORAL at 21:55

## 2017-03-24 RX ADMIN — CHLORHEXIDINE GLUCONATE 15 MILLILITER(S): 213 SOLUTION TOPICAL at 10:13

## 2017-03-24 RX ADMIN — Medication 1 DROP(S): at 05:11

## 2017-03-24 NOTE — PROGRESS NOTE ADULT - SUBJECTIVE AND OBJECTIVE BOX
CC: shortness of breath.     INTERVAL HISTORY: s/p TAVR. Doing well. No chest pain. Awake.     MEDICATIONS  (STANDING):  chlorhexidine 0.12% Liquid 5milliLiter(s) Swish and Spit every 4 hours    ROS: All others negative     PHYSICAL EXAM:  T(C): 36.6, Max: 36.6 (03-24 @ 05:15)  HR: 69 (66 - 84)  BP: 129/58 (108/60 - 129/58)  RR: 25 (16 - 25)  SpO2: 100% (98% - 100%)  Wt(kg): --  I&O's Summary    I & Os for current day (as of 24 Mar 2017 13:56)  =============================================  IN: 100 ml / OUT: 200 ml / NET: -100 ml      Appearance: Normal	  HEENT:   Normal oral mucosa, PERRL, EOMI	  Lymphatic: No lymphadenopathy  Cardiovascular: Normal S1 S2, No JVD, No murmurs, No edema  Respiratory: Lungs clear to auscultation	  Psychiatry: A & O x 3, Mood & affect appropriate  Gastrointestinal:  Soft, Non-tender, + BS	  Skin: No rashes, No ecchymoses, No cyanosis  Neurologic: Non-focal  Extremities: Normal range of motion, No clubbing, cyanosis or edema  Vascular: Peripheral pulses palpable 2+ bilaterally    TELEMETRY: no events. 	    	                        10.7   6.5   )-----------( 215      ( 24 Mar 2017 06:20 )             32.7     24 Mar 2017 06:20    143    |  106    |  65.0   ----------------------------<  93     4.4     |  23.0   |  1.71     Ca    8.4        24 Mar 2017 06:20  Phos  2.6       24 Mar 2017 09:24  Mg     2.4       24 Mar 2017 09:24    TPro  6.6    /  Alb  3.2    /  TBili  0.3    /  DBili  x      /  AST  17     /  ALT  12     /  AlkPhos  36     24 Mar 2017 06:20

## 2017-03-24 NOTE — PROGRESS NOTE ADULT - SUBJECTIVE AND OBJECTIVE BOX
Renal :    CC: Resting Comfortably, in NAD,    INTERVAL HISTORY: s/p TAVR. Doing well. No chest pain. Awake.     MEDICATIONS  (STANDING):    chlorhexidine 0.12% Liquid 5milliLiter(s) Swish and Spit every 4 hours    ROS: All others negative     PHYSICAL EXAM:  T(C): 36.6, Max: 36.6 (03-24 @ 05:15)  HR: 69 (66 - 84)  BP: 129/58 (108/60 - 129/58)  RR: 25 (16 - 25)  SpO2: 100% (98% - 100%)  Wt(kg): --  I&O's Summary    I & Os for current day (as of 24 Mar 2017 13:56)  =============================================  IN: 100 ml / OUT: 200 ml / NET: -100 ml      Appearance: Normal	  HEENT:   Normal oral mucosa, PERRL, EOMI	  Lymphatic: No lymphadenopathy  Cardiovascular: Normal S1 S2, No JVD, No murmurs, No edema  Respiratory: Lungs clear to auscultation	  Psychiatry: A & O x 3, Mood & affect appropriate  Gastrointestinal:  Soft, Non-tender, + BS	  Skin: No rashes, No ecchymoses, No cyanosis  Neurologic: Non-focal  Extremities: Normal range of motion, No clubbing, cyanosis or edema  Vascular: Peripheral pulses palpable 2+ bilaterally    TELEMETRY: no events. 	    	                        10.7   6.5   )-----------( 215      ( 24 Mar 2017 06:20 )             32.7     24 Mar 2017 06:20    143    |  106    |  65.0   ----------------------------<  93     4.4     |  23.0   |  1.71     Ca    8.4        24 Mar 2017 06:20  Phos  2.6       24 Mar 2017 09:24  Mg     2.4       24 Mar 2017 09:24    TPro  6.6    /  Alb  3.2    /  TBili  0.3    /  DBili  x      /  AST  17     /  ALT  12     /  AlkPhos  36     24 Mar 2017 06:20

## 2017-03-24 NOTE — BRIEF OPERATIVE NOTE - PROCEDURE
TAVR, transfemoral approach  03/24/2017  Transfemoral TAVR via Right Femoral Autdown (26mm Micky S3) (NCT# [id number]) (STS/ACC TVT Registry ID# [id number])  Active  [id number]

## 2017-03-24 NOTE — BRIEF OPERATIVE NOTE - PRE-OP DX
Acute on chronic diastolic CHF (congestive heart failure), NYHA class 4  03/24/2017    Active  David Lang  Severe aortic stenosis  03/24/2017    Active  David Lang

## 2017-03-24 NOTE — CHART NOTE - NSCHARTNOTEFT_GEN_A_CORE
Commercial Olson Transfemoral TAVR via Right Femoral Cutdown.  NCT# [id number], STS/ACC TVT Registry ID# [id number].

## 2017-03-24 NOTE — BRIEF OPERATIVE NOTE - COMMENTS
Invasive Lines: Left Radial Arterial Line, Left Femoral Arterial & Venous Sheaths  IV Medication Infusions: None

## 2017-03-25 DIAGNOSIS — I48.91 UNSPECIFIED ATRIAL FIBRILLATION: ICD-10-CM

## 2017-03-25 LAB
ALBUMIN SERPL ELPH-MCNC: 2.8 G/DL — LOW (ref 3.3–5.2)
ALP SERPL-CCNC: 36 U/L — LOW (ref 40–120)
ALT FLD-CCNC: 45 U/L — HIGH
ANION GAP SERPL CALC-SCNC: 13 MMOL/L — SIGNIFICANT CHANGE UP (ref 5–17)
ANION GAP SERPL CALC-SCNC: 16 MMOL/L — SIGNIFICANT CHANGE UP (ref 5–17)
APTT BLD: 28 SEC — SIGNIFICANT CHANGE UP (ref 27.5–37.4)
AST SERPL-CCNC: 62 U/L — HIGH
BILIRUB DIRECT SERPL-MCNC: 0.1 MG/DL — SIGNIFICANT CHANGE UP (ref 0–0.3)
BILIRUB INDIRECT FLD-MCNC: 0.3 MG/DL — SIGNIFICANT CHANGE UP (ref 0.2–1)
BILIRUB SERPL-MCNC: 0.4 MG/DL — SIGNIFICANT CHANGE UP (ref 0.4–2)
BUN SERPL-MCNC: 56 MG/DL — HIGH (ref 8–20)
BUN SERPL-MCNC: 58 MG/DL — HIGH (ref 8–20)
CALCIUM SERPL-MCNC: 7.7 MG/DL — LOW (ref 8.6–10.2)
CALCIUM SERPL-MCNC: 8.1 MG/DL — LOW (ref 8.6–10.2)
CHLORIDE SERPL-SCNC: 107 MMOL/L — SIGNIFICANT CHANGE UP (ref 98–107)
CHLORIDE SERPL-SCNC: 108 MMOL/L — HIGH (ref 98–107)
CK SERPL-CCNC: 116 U/L — SIGNIFICANT CHANGE UP (ref 25–170)
CK SERPL-CCNC: 148 U/L — SIGNIFICANT CHANGE UP (ref 25–170)
CO2 SERPL-SCNC: 21 MMOL/L — LOW (ref 22–29)
CO2 SERPL-SCNC: 22 MMOL/L — SIGNIFICANT CHANGE UP (ref 22–29)
CREAT SERPL-MCNC: 1.81 MG/DL — HIGH (ref 0.5–1.3)
CREAT SERPL-MCNC: 1.82 MG/DL — HIGH (ref 0.5–1.3)
GLUCOSE SERPL-MCNC: 85 MG/DL — SIGNIFICANT CHANGE UP (ref 70–115)
GLUCOSE SERPL-MCNC: 87 MG/DL — SIGNIFICANT CHANGE UP (ref 70–115)
HCT VFR BLD CALC: 28.7 % — LOW (ref 37–47)
HGB BLD-MCNC: 9.3 G/DL — LOW (ref 12–16)
INR BLD: 1.2 RATIO — HIGH (ref 0.88–1.16)
MAGNESIUM SERPL-MCNC: 2 MG/DL — SIGNIFICANT CHANGE UP (ref 1.8–2.5)
MAGNESIUM SERPL-MCNC: 3 MG/DL — HIGH (ref 1.8–2.5)
MCHC RBC-ENTMCNC: 31.5 PG — HIGH (ref 27–31)
MCHC RBC-ENTMCNC: 32.4 G/DL — SIGNIFICANT CHANGE UP (ref 32–36)
MCV RBC AUTO: 97.3 FL — SIGNIFICANT CHANGE UP (ref 81–99)
PHOSPHATE SERPL-MCNC: 3.9 MG/DL — SIGNIFICANT CHANGE UP (ref 2.4–4.7)
PHOSPHATE SERPL-MCNC: 3.9 MG/DL — SIGNIFICANT CHANGE UP (ref 2.4–4.7)
PLATELET # BLD AUTO: 176 K/UL — SIGNIFICANT CHANGE UP (ref 150–400)
POTASSIUM SERPL-MCNC: 4 MMOL/L — SIGNIFICANT CHANGE UP (ref 3.5–5.3)
POTASSIUM SERPL-MCNC: 4.2 MMOL/L — SIGNIFICANT CHANGE UP (ref 3.5–5.3)
POTASSIUM SERPL-SCNC: 4 MMOL/L — SIGNIFICANT CHANGE UP (ref 3.5–5.3)
POTASSIUM SERPL-SCNC: 4.2 MMOL/L — SIGNIFICANT CHANGE UP (ref 3.5–5.3)
PROT SERPL-MCNC: 5.6 G/DL — LOW (ref 6.6–8.7)
PROTHROM AB SERPL-ACNC: 13.2 SEC — HIGH (ref 9.8–12.7)
RBC # BLD: 2.95 M/UL — LOW (ref 4.4–5.2)
RBC # FLD: 13.3 % — SIGNIFICANT CHANGE UP (ref 11–15.6)
SODIUM SERPL-SCNC: 143 MMOL/L — SIGNIFICANT CHANGE UP (ref 135–145)
SODIUM SERPL-SCNC: 144 MMOL/L — SIGNIFICANT CHANGE UP (ref 135–145)
TROPONIN T SERPL-MCNC: 0.55 NG/ML — HIGH (ref 0–0.06)
TROPONIN T SERPL-MCNC: 0.64 NG/ML — HIGH (ref 0–0.06)
WBC # BLD: 7 K/UL — SIGNIFICANT CHANGE UP (ref 4.8–10.8)
WBC # FLD AUTO: 7 K/UL — SIGNIFICANT CHANGE UP (ref 4.8–10.8)

## 2017-03-25 PROCEDURE — 71010: CPT | Mod: 26

## 2017-03-25 PROCEDURE — 93010 ELECTROCARDIOGRAM REPORT: CPT

## 2017-03-25 RX ORDER — ALBUMIN HUMAN 25 %
250 VIAL (ML) INTRAVENOUS ONCE
Qty: 0 | Refills: 0 | Status: COMPLETED | OUTPATIENT
Start: 2017-03-25 | End: 2017-03-25

## 2017-03-25 RX ORDER — SODIUM CHLORIDE 9 MG/ML
3 INJECTION INTRAMUSCULAR; INTRAVENOUS; SUBCUTANEOUS EVERY 8 HOURS
Qty: 0 | Refills: 0 | Status: DISCONTINUED | OUTPATIENT
Start: 2017-03-25 | End: 2017-03-27

## 2017-03-25 RX ORDER — MAGNESIUM SULFATE 500 MG/ML
2 VIAL (ML) INJECTION ONCE
Qty: 0 | Refills: 0 | Status: COMPLETED | OUTPATIENT
Start: 2017-03-25 | End: 2017-03-25

## 2017-03-25 RX ADMIN — Medication 250 MILLILITER(S): at 12:37

## 2017-03-25 RX ADMIN — Medication 0.12 MILLIGRAM(S): at 22:15

## 2017-03-25 RX ADMIN — Medication 1 DROP(S): at 12:39

## 2017-03-25 RX ADMIN — Medication 20 MILLIGRAM(S): at 06:15

## 2017-03-25 RX ADMIN — Medication 50 GRAM(S): at 08:07

## 2017-03-25 RX ADMIN — Medication 100 MILLIGRAM(S): at 07:51

## 2017-03-25 RX ADMIN — ERYTHROPOIETIN 6000 UNIT(S): 10000 INJECTION, SOLUTION INTRAVENOUS; SUBCUTANEOUS at 16:59

## 2017-03-25 RX ADMIN — Medication 1 TABLET(S): at 12:39

## 2017-03-25 RX ADMIN — CLOPIDOGREL BISULFATE 75 MILLIGRAM(S): 75 TABLET, FILM COATED ORAL at 12:39

## 2017-03-25 RX ADMIN — Medication 325 MILLIGRAM(S): at 12:39

## 2017-03-25 RX ADMIN — Medication 500 MILLIGRAM(S): at 12:39

## 2017-03-25 RX ADMIN — SODIUM CHLORIDE 3 MILLILITER(S): 9 INJECTION INTRAMUSCULAR; INTRAVENOUS; SUBCUTANEOUS at 21:02

## 2017-03-25 RX ADMIN — IRON SUCROSE 210 MILLIGRAM(S): 20 INJECTION, SOLUTION INTRAVENOUS at 12:39

## 2017-03-25 NOTE — PROGRESS NOTE ADULT - SUBJECTIVE AND OBJECTIVE BOX
Patient is a 89y old  Female who presents with a chief complaint of SOB (20 Mar 2017 09:59) POD #1 TAVR    Interval/Overnight Events: Patient hemodynamically stable on no pressors or inotropes, NSR. Immediately post op, patient was sinus faiza requiring intermittent pacing via transvenous pacing wires placed in the OR, She has left groin arterial and venous sheath. Overnight had two episodes on accelerated junctional rhythm in the 60s with stable blood pressure. History of CKD stage 4, not on dialysis.     VITAL SIGNS:  T(C): 37.5, Max: 37.6 (03-24 @ 22:00)  HR: 69 (56 - 75)  BP: 97/46 (97/46 - 183/73)  ABP: 134/34 (104/26 - 164/34)  ABP(mean): 65 (50 - 83)  RR: 22 (16 - 26)  SpO2: 99% (93% - 100%)      RESPIRATORY:  [] FiO2:		[] Heliox	[] BiPAP:   [x] NC:    			[] HFNC:    Liters, FiO2:  [] End-Tidal CO2:  [] Mechanical Ventilation:     ABG - ( 24 Mar 2017 15:14 )  pH: 7.33  /  pCO2: 42    /  pO2: 69    / HCO3: 21    / Base Excess: -3.5  /  SaO2: 94    / Lactate: x          Respiratory Medications:    [] Extubation Readiness Assessed  Comments: Extubated without complication    CARDIOVASCULAR  Cardiovascular Medications:  hydrALAZINE Injectable 10milliGRAM(s) IV Push once  furosemide    Tablet 20milliGRAM(s) Oral daily      Cardiac Rhythm:	[] NSR		[] Other:  Comments:    HEMATOLOGIC/ONCOLOGIC:                        10.0   7.8   )-----------( 171      ( 24 Mar 2017 14:10 )             30.9     PT/INR - ( 24 Mar 2017 14:10 )   PT: 13.7 sec;   INR: 1.24 ratio         PTT - ( 24 Mar 2017 14:10 )  PTT:30.7 sec  Transfusions:	[] PRBC	[] Platelets	[] FFP		[] Cryoprecipitate    Hematologic/Oncologic Medications:  aspirin enteric coated 325milliGRAM(s) Oral daily  clopidogrel Tablet 75milliGRAM(s) Oral daily    [x] DVT Prophylaxis:  Comments:    INFECTIOUS DISEASE:  Antimicrobials/Immunologic Medications:  cefuroxime  IVPB 1500milliGRAM(s) IV Intermittent every 24 hours  epoetin audelia Injectable 6000Unit(s) SubCutaneous <User Schedule>    Cultures:    FLUIDS/ELECTROLYTES/NUTRITION:  I&O's Summary  I & Os for 24h ending 24 Mar 2017 07:00  =============================================  IN: 100 ml / OUT: 200 ml / NET: -100 ml    I & Os for current day (as of 25 Mar 2017 04:04)  =============================================  IN: 50 ml / OUT: 485 ml / NET: -435 ml    Daily Weight in k (24 Mar 2017 05:00)  24 Mar 2017 14:10    142    |  108    |  58.0   ----------------------------<  145    4.3     |  20.0   |  1.50     Ca    7.5        24 Mar 2017 14:10  Phos  3.7       24 Mar 2017 14:10  Mg     2.0       24 Mar 2017 14:10    TPro  6.0    /  Alb  2.8    /  TBili  0.7    /  DBili  0.3    /  AST  69     /  ALT  34     /  AlkPhos  32     24 Mar 2017 14:10      Diet:	[x] Regular	[] Soft		[] Clears	[] NPO  .	[] Other:  .	[] NGT		[] NDT		[] GT		[] GJT    Gastrointestinal Medications:  calcium carbonate 500 mG (Tums) Chewable 1Tablet(s) Chew daily  multivitamin 1Tablet(s) Oral daily  ascorbic acid 500milliGRAM(s) Oral daily  iron sucrose IVPB 100milliGRAM(s) IV Intermittent daily    Comments:    NEUROLOGY:    Neurologic Medications:  ALPRAZolam 0.125milliGRAM(s) Oral at bedtime PRN    Comments:    OTHER MEDICATIONS:  Endocrine/Metabolic Medications:    Genitourinary Medications:    Topical/Other Medications:  timolol 0.5% Solution 1Drop(s) Left EYE daily      PATIENT CARE ACCESS DEVICES:  [x] Peripheral IV  [] Central Venous Line	[] R	[] L	[] IJ	[] Fem	[] SC			[] Placed:  [x] Arterial Line		[] R	[x] L	[] PT	[] DP	[] Fem	[] Rad	[] Ax	[] Placed:  [] PICC:				[] Broviac		[] Mediport  [x] Urinary Catheter, Date Placed: 3/24/17  [x] Necessity of urinary, arterial, and venous catheters discussed    PHYSICAL EXAM:  Respiratory:		[x] Normal  Breath Sounds:		[x] Normal  .	Rhonchi	[] Right		[] Left  .	Wheezing	[] Right		[] Left  .	Diminished	[] Right		[] Left  .	Crackles	[] Right		[] Left  Effort:		[x] Even unlabored	[] Nasal Flaring		[] Grunting  .		[] Stridor		[] Retractions		[] Ventilator assisted  Comments:    Cardiovascular:		[x] Normal  Murmur:		[x] None		[] Present:  Capillary Refill		[x] Brisk, less than 2 seconds	[] Prolonged:  Pulses:			[x] Equal and strong		[] Other:  Comments:    Abdominal:	[x] Normal  [x] Soft		[] Distended	[] Tender	[] Taut		[] Rigid		[] BS Absent  Comments:    Skin:		[] Normal  Edema:		[] None		[] Generalized	[] 1+	[] 2+	[] 3+	[] 4+  Rash:		[] None		[] Present:  Comments:    Neurologic:	[x] Normal  		[] Alert		[] Sedated	[] No acute change from baseline  Comments:      IMAGING STUDIES:    Patient and Parent/Guardian updated as to the progress/plan of care:	[x] Yes	[] No

## 2017-03-25 NOTE — PROGRESS NOTE ADULT - SUBJECTIVE AND OBJECTIVE BOX
Cardiac Anesthesia Post-Op Note    s/p CABGx3, POD1, extubated, no events.  No pressor support.  Comfortable in bed.  No apparent anesthetic complications.  BP  155-62. P62, R20, 100%sat.  Care per [facility]. Cardiac Anesthesia Post-Op Note    s/p TAVR, POD1, extubated, no events.  No pressor support.  Comfortable in bed.  No apparent anesthetic complications.  BP  155-62. P62, R20, 100%sat.  Care per [facility].

## 2017-03-26 DIAGNOSIS — I48.0 PAROXYSMAL ATRIAL FIBRILLATION: ICD-10-CM

## 2017-03-26 DIAGNOSIS — Z29.9 ENCOUNTER FOR PROPHYLACTIC MEASURES, UNSPECIFIED: ICD-10-CM

## 2017-03-26 LAB
ANION GAP SERPL CALC-SCNC: 15 MMOL/L — SIGNIFICANT CHANGE UP (ref 5–17)
APPEARANCE UR: CLEAR — SIGNIFICANT CHANGE UP
BILIRUB UR-MCNC: NEGATIVE — SIGNIFICANT CHANGE UP
BUN SERPL-MCNC: 57 MG/DL — HIGH (ref 8–20)
CALCIUM SERPL-MCNC: 7.9 MG/DL — LOW (ref 8.6–10.2)
CHLORIDE SERPL-SCNC: 103 MMOL/L — SIGNIFICANT CHANGE UP (ref 98–107)
CO2 SERPL-SCNC: 21 MMOL/L — LOW (ref 22–29)
COLOR SPEC: YELLOW — SIGNIFICANT CHANGE UP
CREAT ?TM UR-MCNC: 63 MG/DL — SIGNIFICANT CHANGE UP
CREAT SERPL-MCNC: 1.97 MG/DL — HIGH (ref 0.5–1.3)
DIFF PNL FLD: ABNORMAL
EPI CELLS # UR: ABNORMAL
GLUCOSE SERPL-MCNC: 97 MG/DL — SIGNIFICANT CHANGE UP (ref 70–115)
GLUCOSE UR QL: NEGATIVE MG/DL — SIGNIFICANT CHANGE UP
HCT VFR BLD CALC: 27.1 % — LOW (ref 37–47)
HGB BLD-MCNC: 9 G/DL — LOW (ref 12–16)
KETONES UR-MCNC: NEGATIVE — SIGNIFICANT CHANGE UP
LEUKOCYTE ESTERASE UR-ACNC: ABNORMAL
MAGNESIUM SERPL-MCNC: 2.4 MG/DL — SIGNIFICANT CHANGE UP (ref 1.8–2.5)
MCHC RBC-ENTMCNC: 31.9 PG — HIGH (ref 27–31)
MCHC RBC-ENTMCNC: 33.2 G/DL — SIGNIFICANT CHANGE UP (ref 32–36)
MCV RBC AUTO: 96.1 FL — SIGNIFICANT CHANGE UP (ref 81–99)
NITRITE UR-MCNC: NEGATIVE — SIGNIFICANT CHANGE UP
OSMOLALITY UR: 397 MOSM/KG — SIGNIFICANT CHANGE UP (ref 300–1000)
PH UR: 5 — SIGNIFICANT CHANGE UP (ref 4.8–8)
PHOSPHATE SERPL-MCNC: 2.7 MG/DL — SIGNIFICANT CHANGE UP (ref 2.4–4.7)
PLATELET # BLD AUTO: 179 K/UL — SIGNIFICANT CHANGE UP (ref 150–400)
POTASSIUM SERPL-MCNC: 3.8 MMOL/L — SIGNIFICANT CHANGE UP (ref 3.5–5.3)
POTASSIUM SERPL-SCNC: 3.8 MMOL/L — SIGNIFICANT CHANGE UP (ref 3.5–5.3)
PROT ?TM UR-MCNC: 14 MG/DL — HIGH (ref 0–12)
PROT UR-MCNC: 15 MG/DL
PROT/CREAT UR-RTO: 0.2 RATIO — SIGNIFICANT CHANGE UP
RBC # BLD: 2.82 M/UL — LOW (ref 4.4–5.2)
RBC # FLD: 13.3 % — SIGNIFICANT CHANGE UP (ref 11–15.6)
RBC CASTS # UR COMP ASSIST: ABNORMAL /HPF (ref 0–4)
SODIUM SERPL-SCNC: 139 MMOL/L — SIGNIFICANT CHANGE UP (ref 135–145)
SODIUM UR-SCNC: 51 MMOL/L — SIGNIFICANT CHANGE UP
SP GR SPEC: 1.01 — SIGNIFICANT CHANGE UP (ref 1.01–1.02)
UROBILINOGEN FLD QL: NEGATIVE MG/DL — SIGNIFICANT CHANGE UP
WBC # BLD: 7 K/UL — SIGNIFICANT CHANGE UP (ref 4.8–10.8)
WBC # FLD AUTO: 7 K/UL — SIGNIFICANT CHANGE UP (ref 4.8–10.8)
WBC UR QL: ABNORMAL

## 2017-03-26 PROCEDURE — 99233 SBSQ HOSP IP/OBS HIGH 50: CPT | Mod: GC

## 2017-03-26 PROCEDURE — 93010 ELECTROCARDIOGRAM REPORT: CPT

## 2017-03-26 PROCEDURE — 99233 SBSQ HOSP IP/OBS HIGH 50: CPT

## 2017-03-26 PROCEDURE — 71010: CPT | Mod: 26

## 2017-03-26 RX ORDER — ATENOLOL 25 MG/1
25 TABLET ORAL
Qty: 0 | Refills: 0 | Status: DISCONTINUED | OUTPATIENT
Start: 2017-03-26 | End: 2017-03-27

## 2017-03-26 RX ORDER — PANTOPRAZOLE SODIUM 20 MG/1
40 TABLET, DELAYED RELEASE ORAL
Qty: 0 | Refills: 0 | Status: DISCONTINUED | OUTPATIENT
Start: 2017-03-26 | End: 2017-03-27

## 2017-03-26 RX ORDER — SENNA PLUS 8.6 MG/1
2 TABLET ORAL AT BEDTIME
Qty: 0 | Refills: 0 | Status: DISCONTINUED | OUTPATIENT
Start: 2017-03-26 | End: 2017-03-27

## 2017-03-26 RX ORDER — DOCUSATE SODIUM 100 MG
100 CAPSULE ORAL THREE TIMES A DAY
Qty: 0 | Refills: 0 | Status: DISCONTINUED | OUTPATIENT
Start: 2017-03-26 | End: 2017-03-27

## 2017-03-26 RX ADMIN — SODIUM CHLORIDE 3 MILLILITER(S): 9 INJECTION INTRAMUSCULAR; INTRAVENOUS; SUBCUTANEOUS at 13:10

## 2017-03-26 RX ADMIN — Medication 1 DROP(S): at 12:58

## 2017-03-26 RX ADMIN — SODIUM CHLORIDE 3 MILLILITER(S): 9 INJECTION INTRAMUSCULAR; INTRAVENOUS; SUBCUTANEOUS at 21:08

## 2017-03-26 RX ADMIN — Medication 100 MILLIGRAM(S): at 13:10

## 2017-03-26 RX ADMIN — Medication 1 TABLET(S): at 12:57

## 2017-03-26 RX ADMIN — Medication 500 MILLIGRAM(S): at 12:55

## 2017-03-26 RX ADMIN — IRON SUCROSE 210 MILLIGRAM(S): 20 INJECTION, SOLUTION INTRAVENOUS at 12:54

## 2017-03-26 RX ADMIN — SODIUM CHLORIDE 3 MILLILITER(S): 9 INJECTION INTRAMUSCULAR; INTRAVENOUS; SUBCUTANEOUS at 05:22

## 2017-03-26 RX ADMIN — PANTOPRAZOLE SODIUM 40 MILLIGRAM(S): 20 TABLET, DELAYED RELEASE ORAL at 13:09

## 2017-03-26 RX ADMIN — CLOPIDOGREL BISULFATE 75 MILLIGRAM(S): 75 TABLET, FILM COATED ORAL at 12:54

## 2017-03-26 RX ADMIN — Medication 20 MILLIGRAM(S): at 05:21

## 2017-03-26 RX ADMIN — Medication 100 MILLIGRAM(S): at 21:12

## 2017-03-26 RX ADMIN — Medication 100 MILLIGRAM(S): at 09:27

## 2017-03-26 RX ADMIN — Medication 325 MILLIGRAM(S): at 12:57

## 2017-03-26 RX ADMIN — ATENOLOL 25 MILLIGRAM(S): 25 TABLET ORAL at 18:18

## 2017-03-26 RX ADMIN — Medication 0.12 MILLIGRAM(S): at 21:19

## 2017-03-26 RX ADMIN — Medication 1 TABLET(S): at 12:55

## 2017-03-26 RX ADMIN — SENNA PLUS 2 TABLET(S): 8.6 TABLET ORAL at 21:12

## 2017-03-26 NOTE — PROGRESS NOTE ADULT - SUBJECTIVE AND OBJECTIVE BOX
Renal :      Subjective: Feels well, Family @ bedside,    Denies SOB or CP.  In NAD,      Tele: Afib/Aflutter                       T(F): 98.2, Max: 99.3 ( @ 12:00)  HR: 83 (59 - 102)  BP: 112/70 (94/43 - 155/62)  RR: 18 (18 - 25)  SpO2: 100% (95% - 100%) on Room air at rest.    Daily Weight in k (25 Mar 2017 11:29)    LV EF: 60%      Allergies:    Toprol-XL (Other; Short breath; Hypotension)      26 Mar 2017 06:43    139    |  103    |  57.0   ----------------------------<  97     3.8     |  21.0   |  1.97     Ca    7.9        26 Mar 2017 06:43  Phos  2.7       26 Mar 2017 06:43  Mg     2.4       26 Mar 2017 06:43    TPro  5.6    /  Alb  2.8    /  TBili  0.4    /  DBili  0.1    /  AST  62     /  ALT  45     /  AlkPhos  36     25 Mar 2017 04:01                               9.0    7.0   )-----------( 179      ( 26 Mar 2017 06:45 )             27.1        PT/INR - ( 25 Mar 2017 04:01 )   PT: 13.2 sec;   INR: 1.20 ratio         PTT - ( 25 Mar 2017 04:01 )  PTT:28.0 sec       CAPILLARY BLOOD GLUCOSE           CXR:  BLL atelectasis.    I&O's Detail    I & Os for current day (as of 26 Mar 2017 09:07)  =============================================  IN:    Oral Fluid: 720 ml    Albumin 5%  - 250 mL: 250 ml    IV PiggyBack: 100 ml    Solution: 100 ml    Total IN: 1170 ml  ---------------------------------------------  OUT:    Indwelling Catheter - Urethral: 640 ml    Voided: 230 ml    Total OUT: 870 ml  ---------------------------------------------  Total NET: 300 ml      CHEST TUBE:  [ ] YES [* ] NO  OUTPUT:     per 24 hours    AIR LEAKS:  [ ] YES [ ] NO      KIANA DRAIN:   [ ] YES [* ] NO  OUTPUT:     per 24 hours    EPICARDIAL WIRES:  [ ] YES [* ] NO      BOWEL MOVEMENT:  [ ] YES [* ] NO        Active Medications:  cefuroxime  IVPB 1500milliGRAM(s) IV Intermittent every 24 hours  aspirin enteric coated 325milliGRAM(s) Oral daily  clopidogrel Tablet 75milliGRAM(s) Oral daily  calcium carbonate 500 mG (Tums) Chewable 1Tablet(s) Chew daily  ALPRAZolam 0.125milliGRAM(s) Oral at bedtime PRN  furosemide    Tablet 20milliGRAM(s) Oral daily  multivitamin 1Tablet(s) Oral daily  ascorbic acid 500milliGRAM(s) Oral daily  epoetin audelia Injectable 6000Unit(s) SubCutaneous <User Schedule>  iron sucrose IVPB 100milliGRAM(s) IV Intermittent daily  timolol 0.5% Solution 1Drop(s) Left EYE daily  sodium chloride 0.9% lock flush 3milliLiter(s) IV Push every 8 hours  docusate sodium 100milliGRAM(s) Oral three times a day  senna 2Tablet(s) Oral at bedtime  pantoprazole    Tablet 40milliGRAM(s) Oral before breakfast    ROS :     Genera; Feels well  Card : S/P TAVR,   : Neg  Resp : neg    Physical Exam:    Neuro: AAOX3.  No focal deficits.    Pulm: Diminished BLL.    CV: Irregular.  +S1+S2.    Abd: Soft/NT/ND.  +BS.      Extremities: Trace edema , BLE.  +pp.  - calf tenderness.    Skin:  Sporadic ecchymosis to BLLE and BL upper extremities.  Ecchymosis also noted to BL eye lids and under each eye.      HEENT:  Right eye with erythematous sclera.  Pale,    Incision(s): BL femoral incision sites with DSD.  No hematoma.        PAST MEDICAL & SURGICAL HISTORY:    CKD (chronic kidney disease) stage 4, GFR 15-29 ml/min  NSTEMI (non-ST elevated myocardial infarction)  Atrial fibrillation  HTN (hypertension)  CHF (congestive heart failure)  Aortic stenosis    H/O abdominal hysterectomy  History of cholecystectomy

## 2017-03-26 NOTE — PROGRESS NOTE ADULT - PROBLEM SELECTOR PLAN 6
Add Protonix for GI prophylaxis.  SCD's for DVT prophylaxis.   Add Senna and colace for bowel regimen.  OP F.U for CKD - 4 & Anemia management,    PO Fluid : 1500 - 2000 ml., daily,
Add Protonix for GI prophylaxis.  SCD's for DVT prophylaxis. No Lovenox per Dr. Anne.  Add Senna and colace for bowel regimen.      Plan for discharge home tomorrow.  Discussed with Dr. Anne in AM rounds.

## 2017-03-26 NOTE — PROGRESS NOTE ADULT - SUBJECTIVE AND OBJECTIVE BOX
Subjective: "I feel good today.  I haven't really been up walking yet."  Sitting up in bed.  Denies SOB or CP.  NAD noted.      Tele: Afib/Aflutter                       T(F): 98.2, Max: 99.3 ( @ 12:00)  HR: 83 (59 - 102)  BP: 112/70 (94/43 - 155/62)  RR: 18 (18 - 25)  SpO2: 100% (95% - 100%) on Room air at rest.          Daily     Daily Weight in k (25 Mar 2017 11:29)    LV EF: 60%      Allergies:  Health Shakes  (Unknown)  Toprol-XL (Other; Short breath; Hypotension)      26 Mar 2017 06:43    139    |  103    |  57.0   ----------------------------<  97     3.8     |  21.0   |  1.97     Ca    7.9        26 Mar 2017 06:43  Phos  2.7       26 Mar 2017 06:43  Mg     2.4       26 Mar 2017 06:43    TPro  5.6    /  Alb  2.8    /  TBili  0.4    /  DBili  0.1    /  AST  62     /  ALT  45     /  AlkPhos  36     25 Mar 2017 04:01                               9.0    7.0   )-----------( 179      ( 26 Mar 2017 06:45 )             27.1        PT/INR - ( 25 Mar 2017 04:01 )   PT: 13.2 sec;   INR: 1.20 ratio         PTT - ( 25 Mar 2017 04:01 )  PTT:28.0 sec       CAPILLARY BLOOD GLUCOSE           CXR: BLL atelectasis.    I&O's Detail    I & Os for current day (as of 26 Mar 2017 09:07)  =============================================  IN:    Oral Fluid: 720 ml    Albumin 5%  - 250 mL: 250 ml    IV PiggyBack: 100 ml    Solution: 100 ml    Total IN: 1170 ml  ---------------------------------------------  OUT:    Indwelling Catheter - Urethral: 640 ml    Voided: 230 ml    Total OUT: 870 ml  ---------------------------------------------  Total NET: 300 ml      CHEST TUBE:  [ ] YES [* ] NO  OUTPUT:     per 24 hours    AIR LEAKS:  [ ] YES [ ] NO      KIANA DRAIN:   [ ] YES [* ] NO  OUTPUT:     per 24 hours    EPICARDIAL WIRES:  [ ] YES [* ] NO      BOWEL MOVEMENT:  [ ] YES [* ] NO        Active Medications:  cefuroxime  IVPB 1500milliGRAM(s) IV Intermittent every 24 hours  aspirin enteric coated 325milliGRAM(s) Oral daily  clopidogrel Tablet 75milliGRAM(s) Oral daily  calcium carbonate 500 mG (Tums) Chewable 1Tablet(s) Chew daily  ALPRAZolam 0.125milliGRAM(s) Oral at bedtime PRN  furosemide    Tablet 20milliGRAM(s) Oral daily  multivitamin 1Tablet(s) Oral daily  ascorbic acid 500milliGRAM(s) Oral daily  epoetin audelia Injectable 6000Unit(s) SubCutaneous <User Schedule>  iron sucrose IVPB 100milliGRAM(s) IV Intermittent daily  timolol 0.5% Solution 1Drop(s) Left EYE daily  sodium chloride 0.9% lock flush 3milliLiter(s) IV Push every 8 hours  docusate sodium 100milliGRAM(s) Oral three times a day  senna 2Tablet(s) Oral at bedtime  pantoprazole    Tablet 40milliGRAM(s) Oral before breakfast      Physical Exam:    Neuro: AAOX3.  No focal deficits.    Pulm: Diminished BLL.    CV: Irregular.  +S1+S2.    Abd: Soft/NT/ND.  +BS.      Extremities: Trace edema BLE.  +pp.  - calf tenderness.    Skin:  Sporadic ecchymosis to BLLE and BL upper extremities.  Ecchymosis also noted to BL eye lids and under each eye.      HEENT:  Right eye with erythematous sclera.      Incision(s): BL femoral incision sites with DSD.  No hematoma.         PAST MEDICAL & SURGICAL HISTORY:  CKD (chronic kidney disease) stage 4, GFR 15-29 ml/min  Urine frequency  NSTEMI (non-ST elevated myocardial infarction)  Atrial fibrillation  HTN (hypertension)  Orthopnea  CLIFTON (dyspnea on exertion)  CHF (congestive heart failure)  Bruises easily  Aortic stenosis  Anxiety  H/O abdominal hysterectomy  History of cholecystectomy

## 2017-03-27 ENCOUNTER — TRANSCRIPTION ENCOUNTER (OUTPATIENT)
Age: 82
End: 2017-03-27

## 2017-03-27 VITALS — SYSTOLIC BLOOD PRESSURE: 144 MMHG | DIASTOLIC BLOOD PRESSURE: 44 MMHG | RESPIRATION RATE: 16 BRPM | HEART RATE: 66 BPM

## 2017-03-27 LAB
ANION GAP SERPL CALC-SCNC: 11 MMOL/L — SIGNIFICANT CHANGE UP (ref 5–17)
BUN SERPL-MCNC: 57 MG/DL — HIGH (ref 8–20)
CALCIUM SERPL-MCNC: 7.9 MG/DL — LOW (ref 8.6–10.2)
CHLORIDE SERPL-SCNC: 104 MMOL/L — SIGNIFICANT CHANGE UP (ref 98–107)
CO2 SERPL-SCNC: 24 MMOL/L — SIGNIFICANT CHANGE UP (ref 22–29)
CREAT SERPL-MCNC: 1.9 MG/DL — HIGH (ref 0.5–1.3)
GLUCOSE SERPL-MCNC: 88 MG/DL — SIGNIFICANT CHANGE UP (ref 70–115)
HCT VFR BLD CALC: 26 % — LOW (ref 37–47)
HGB BLD-MCNC: 8.4 G/DL — LOW (ref 12–16)
MAGNESIUM SERPL-MCNC: 2.3 MG/DL — SIGNIFICANT CHANGE UP (ref 1.8–2.5)
MCHC RBC-ENTMCNC: 31.6 PG — HIGH (ref 27–31)
MCHC RBC-ENTMCNC: 32.3 G/DL — SIGNIFICANT CHANGE UP (ref 32–36)
MCV RBC AUTO: 97.7 FL — SIGNIFICANT CHANGE UP (ref 81–99)
PHOSPHATE SERPL-MCNC: 2.1 MG/DL — LOW (ref 2.4–4.7)
PLATELET # BLD AUTO: 175 K/UL — SIGNIFICANT CHANGE UP (ref 150–400)
POTASSIUM SERPL-MCNC: 3.8 MMOL/L — SIGNIFICANT CHANGE UP (ref 3.5–5.3)
POTASSIUM SERPL-SCNC: 3.8 MMOL/L — SIGNIFICANT CHANGE UP (ref 3.5–5.3)
RBC # BLD: 2.66 M/UL — LOW (ref 4.4–5.2)
RBC # FLD: 13.5 % — SIGNIFICANT CHANGE UP (ref 11–15.6)
SODIUM SERPL-SCNC: 139 MMOL/L — SIGNIFICANT CHANGE UP (ref 135–145)
WBC # BLD: 6.9 K/UL — SIGNIFICANT CHANGE UP (ref 4.8–10.8)
WBC # FLD AUTO: 6.9 K/UL — SIGNIFICANT CHANGE UP (ref 4.8–10.8)

## 2017-03-27 PROCEDURE — 93306 TTE W/DOPPLER COMPLETE: CPT

## 2017-03-27 PROCEDURE — 86850 RBC ANTIBODY SCREEN: CPT

## 2017-03-27 PROCEDURE — 83550 IRON BINDING TEST: CPT

## 2017-03-27 PROCEDURE — 84134 ASSAY OF PREALBUMIN: CPT

## 2017-03-27 PROCEDURE — 76775 US EXAM ABDO BACK WALL LIM: CPT

## 2017-03-27 PROCEDURE — 83970 ASSAY OF PARATHORMONE: CPT

## 2017-03-27 PROCEDURE — L8699: CPT

## 2017-03-27 PROCEDURE — 87641 MR-STAPH DNA AMP PROBE: CPT

## 2017-03-27 PROCEDURE — P9045: CPT

## 2017-03-27 PROCEDURE — 83605 ASSAY OF LACTIC ACID: CPT

## 2017-03-27 PROCEDURE — 83880 ASSAY OF NATRIURETIC PEPTIDE: CPT

## 2017-03-27 PROCEDURE — 85730 THROMBOPLASTIN TIME PARTIAL: CPT

## 2017-03-27 PROCEDURE — 84484 ASSAY OF TROPONIN QUANT: CPT

## 2017-03-27 PROCEDURE — 83735 ASSAY OF MAGNESIUM: CPT

## 2017-03-27 PROCEDURE — 83036 HEMOGLOBIN GLYCOSYLATED A1C: CPT

## 2017-03-27 PROCEDURE — C1894: CPT

## 2017-03-27 PROCEDURE — 87640 STAPH A DNA AMP PROBE: CPT

## 2017-03-27 PROCEDURE — 86920 COMPATIBILITY TEST SPIN: CPT

## 2017-03-27 PROCEDURE — 84100 ASSAY OF PHOSPHORUS: CPT

## 2017-03-27 PROCEDURE — 74176 CT ABD & PELVIS W/O CONTRAST: CPT

## 2017-03-27 PROCEDURE — 93005 ELECTROCARDIOGRAM TRACING: CPT

## 2017-03-27 PROCEDURE — 76000 FLUOROSCOPY <1 HR PHYS/QHP: CPT

## 2017-03-27 PROCEDURE — 86900 BLOOD TYPING SEROLOGIC ABO: CPT

## 2017-03-27 PROCEDURE — 85014 HEMATOCRIT: CPT

## 2017-03-27 PROCEDURE — 84132 ASSAY OF SERUM POTASSIUM: CPT

## 2017-03-27 PROCEDURE — 82728 ASSAY OF FERRITIN: CPT

## 2017-03-27 PROCEDURE — 36415 COLL VENOUS BLD VENIPUNCTURE: CPT

## 2017-03-27 PROCEDURE — 97110 THERAPEUTIC EXERCISES: CPT

## 2017-03-27 PROCEDURE — 82550 ASSAY OF CK (CPK): CPT

## 2017-03-27 PROCEDURE — 81001 URINALYSIS AUTO W/SCOPE: CPT

## 2017-03-27 PROCEDURE — 97116 GAIT TRAINING THERAPY: CPT

## 2017-03-27 PROCEDURE — 99232 SBSQ HOSP IP/OBS MODERATE 35: CPT

## 2017-03-27 PROCEDURE — 80076 HEPATIC FUNCTION PANEL: CPT

## 2017-03-27 PROCEDURE — 84295 ASSAY OF SERUM SODIUM: CPT

## 2017-03-27 PROCEDURE — 82947 ASSAY GLUCOSE BLOOD QUANT: CPT

## 2017-03-27 PROCEDURE — 71250 CT THORAX DX C-: CPT

## 2017-03-27 PROCEDURE — 93320 DOPPLER ECHO COMPLETE: CPT

## 2017-03-27 PROCEDURE — 93312 ECHO TRANSESOPHAGEAL: CPT

## 2017-03-27 PROCEDURE — 82435 ASSAY OF BLOOD CHLORIDE: CPT

## 2017-03-27 PROCEDURE — 83935 ASSAY OF URINE OSMOLALITY: CPT

## 2017-03-27 PROCEDURE — 82310 ASSAY OF CALCIUM: CPT

## 2017-03-27 PROCEDURE — 85610 PROTHROMBIN TIME: CPT

## 2017-03-27 PROCEDURE — C1887: CPT

## 2017-03-27 PROCEDURE — 99231 SBSQ HOSP IP/OBS SF/LOW 25: CPT

## 2017-03-27 PROCEDURE — 99222 1ST HOSP IP/OBS MODERATE 55: CPT

## 2017-03-27 PROCEDURE — 99291 CRITICAL CARE FIRST HOUR: CPT | Mod: 25

## 2017-03-27 PROCEDURE — 82330 ASSAY OF CALCIUM: CPT

## 2017-03-27 PROCEDURE — 93325 DOPPLER ECHO COLOR FLOW MAPG: CPT

## 2017-03-27 PROCEDURE — 82803 BLOOD GASES ANY COMBINATION: CPT

## 2017-03-27 PROCEDURE — 84466 ASSAY OF TRANSFERRIN: CPT

## 2017-03-27 PROCEDURE — 94010 BREATHING CAPACITY TEST: CPT

## 2017-03-27 PROCEDURE — 80053 COMPREHEN METABOLIC PANEL: CPT

## 2017-03-27 PROCEDURE — 99221 1ST HOSP IP/OBS SF/LOW 40: CPT

## 2017-03-27 PROCEDURE — 71045 X-RAY EXAM CHEST 1 VIEW: CPT

## 2017-03-27 PROCEDURE — 94660 CPAP INITIATION&MGMT: CPT

## 2017-03-27 PROCEDURE — 93880 EXTRACRANIAL BILAT STUDY: CPT

## 2017-03-27 PROCEDURE — 84156 ASSAY OF PROTEIN URINE: CPT

## 2017-03-27 PROCEDURE — C1889: CPT

## 2017-03-27 PROCEDURE — 85027 COMPLETE CBC AUTOMATED: CPT

## 2017-03-27 PROCEDURE — 97163 PT EVAL HIGH COMPLEX 45 MIN: CPT

## 2017-03-27 PROCEDURE — 93010 ELECTROCARDIOGRAM REPORT: CPT

## 2017-03-27 PROCEDURE — C1769: CPT

## 2017-03-27 PROCEDURE — 84300 ASSAY OF URINE SODIUM: CPT

## 2017-03-27 PROCEDURE — 71010: CPT | Mod: 26

## 2017-03-27 PROCEDURE — 86901 BLOOD TYPING SEROLOGIC RH(D): CPT

## 2017-03-27 PROCEDURE — 80048 BASIC METABOLIC PNL TOTAL CA: CPT

## 2017-03-27 PROCEDURE — 82306 VITAMIN D 25 HYDROXY: CPT

## 2017-03-27 PROCEDURE — 85379 FIBRIN DEGRADATION QUANT: CPT

## 2017-03-27 RX ORDER — DOCUSATE SODIUM 100 MG
1 CAPSULE ORAL
Qty: 0 | Refills: 0 | COMMUNITY
Start: 2017-03-27

## 2017-03-27 RX ORDER — CLOPIDOGREL BISULFATE 75 MG/1
1 TABLET, FILM COATED ORAL
Qty: 30 | Refills: 0
Start: 2017-03-27 | End: 2017-04-26

## 2017-03-27 RX ORDER — ACETAMINOPHEN 500 MG
2 TABLET ORAL
Qty: 0 | Refills: 0 | COMMUNITY
Start: 2017-03-27

## 2017-03-27 RX ORDER — CLOPIDOGREL BISULFATE 75 MG/1
1 TABLET, FILM COATED ORAL
Qty: 0 | Refills: 0 | COMMUNITY
Start: 2017-03-27

## 2017-03-27 RX ORDER — SENNA PLUS 8.6 MG/1
2 TABLET ORAL
Qty: 0 | Refills: 0 | COMMUNITY
Start: 2017-03-27

## 2017-03-27 RX ORDER — ATENOLOL 25 MG/1
1 TABLET ORAL
Qty: 0 | Refills: 0 | COMMUNITY
Start: 2017-03-27

## 2017-03-27 RX ORDER — ASPIRIN/CALCIUM CARB/MAGNESIUM 324 MG
1 TABLET ORAL
Qty: 0 | Refills: 0 | COMMUNITY
Start: 2017-03-27

## 2017-03-27 RX ORDER — CALCIUM CARBONATE 500(1250)
1 TABLET ORAL
Qty: 0 | Refills: 0 | COMMUNITY
Start: 2017-03-27

## 2017-03-27 RX ORDER — PANTOPRAZOLE SODIUM 20 MG/1
1 TABLET, DELAYED RELEASE ORAL
Qty: 14 | Refills: 0 | OUTPATIENT
Start: 2017-03-27 | End: 2017-04-10

## 2017-03-27 RX ORDER — FERROUS SULFATE 325(65) MG
1 TABLET ORAL
Qty: 0 | Refills: 0 | COMMUNITY
Start: 2017-03-27

## 2017-03-27 RX ORDER — ATENOLOL 25 MG/1
25 TABLET ORAL DAILY
Qty: 0 | Refills: 0 | Status: DISCONTINUED | OUTPATIENT
Start: 2017-03-27 | End: 2017-03-27

## 2017-03-27 RX ORDER — ACETAMINOPHEN 500 MG
650 TABLET ORAL EVERY 6 HOURS
Qty: 0 | Refills: 0 | Status: DISCONTINUED | OUTPATIENT
Start: 2017-03-27 | End: 2017-03-27

## 2017-03-27 RX ADMIN — SODIUM CHLORIDE 3 MILLILITER(S): 9 INJECTION INTRAMUSCULAR; INTRAVENOUS; SUBCUTANEOUS at 14:05

## 2017-03-27 RX ADMIN — Medication 20 MILLIGRAM(S): at 06:09

## 2017-03-27 RX ADMIN — Medication 1 TABLET(S): at 12:08

## 2017-03-27 RX ADMIN — ATENOLOL 25 MILLIGRAM(S): 25 TABLET ORAL at 06:09

## 2017-03-27 RX ADMIN — Medication 1 DROP(S): at 12:09

## 2017-03-27 RX ADMIN — IRON SUCROSE 210 MILLIGRAM(S): 20 INJECTION, SOLUTION INTRAVENOUS at 12:16

## 2017-03-27 RX ADMIN — CLOPIDOGREL BISULFATE 75 MILLIGRAM(S): 75 TABLET, FILM COATED ORAL at 12:08

## 2017-03-27 RX ADMIN — Medication 500 MILLIGRAM(S): at 12:08

## 2017-03-27 RX ADMIN — Medication 100 MILLIGRAM(S): at 06:09

## 2017-03-27 RX ADMIN — PANTOPRAZOLE SODIUM 40 MILLIGRAM(S): 20 TABLET, DELAYED RELEASE ORAL at 06:09

## 2017-03-27 RX ADMIN — Medication 100 MILLIGRAM(S): at 14:31

## 2017-03-27 RX ADMIN — Medication 325 MILLIGRAM(S): at 12:08

## 2017-03-27 NOTE — PROGRESS NOTE ADULT - PROBLEM SELECTOR PROBLEM 2
Acute congestive heart failure, unspecified congestive heart failure type
Aortic valve stenosis, unspecified etiology
Paroxysmal a-fib
Paroxysmal a-fib
Acute congestive heart failure, unspecified congestive heart failure type
Acute congestive heart failure, unspecified congestive heart failure type
Aortic stenosis, severe
Aortic valve stenosis, unspecified etiology
Essential hypertension
HTN (hypertension)
Paroxysmal a-fib
Stage 4 chronic kidney disease

## 2017-03-27 NOTE — PROGRESS NOTE ADULT - ATTENDING COMMENTS
Discussed w. David Judd Supariwala &  XIAO Gabriel
IVF - when NPO,
Discussed w. the RN & Daughter,
Discussed with Dr. Anne in CT surgery rounds.

## 2017-03-27 NOTE — PROGRESS NOTE ADULT - PROBLEM SELECTOR PROBLEM 3
Acute on chronic diastolic heart failure
HTN (hypertension)
HTN (hypertension)
Acute on chronic diastolic heart failure
Anemia associated with stage 4 chronic renal failure
Atrial fibrillation
HTN (hypertension)
Paroxysmal atrial fibrillation

## 2017-03-27 NOTE — PROGRESS NOTE ADULT - PROBLEM SELECTOR PLAN 2
No diuretics. Hold for now.
Continue ASA & Plavix.
Continue ASA & Plavix.  awaiting to hear from Dr Anne if we should cont Plavix before surgery
JOHN planned for this week.
No diuretics. Hold for now.
S/P TF-TAVR  Encourage CDBE/IS and increased mobility.  P.T
on po lasix.
Continue ASA & Plavix.
Continue atenolol for blood pressure control.
F/U with Renal service regarding CKD stage 4.
S/P TF-TAVR  Encourage CDBE/IS and increased mobility.  PT to see pt today.
Stable,

## 2017-03-27 NOTE — DISCHARGE NOTE ADULT - INSTRUCTIONS
Maintain low fat, low sodium, diabetic & renal diabetic diet.  Daily weights & showers. Sternal precautions. 1. Daily Shower  2. Weight yourself daily and notify any weight gain greater than 2-3 pounds in 24 hours.  3. Regular diet - low fat, low cholesterol, no added salt.  4. Cleanse right femoral incision daily while showering with warm water and mild soap, pat dry and maintain open to air.   DO NOT APPLY ANY LOTION, CREAMS, OR POWDERS TO INCISIONS, KEEP OPEN TO AIR  5. No driving until cleared by MD.   6. No heavy lifting nothing greater than 5 pounds until cleared by MD.   7. Call / Notify MD any fever greater than 101.0  8. Increase Activity as tolerated.  9. Utilize heart pillow to splint pillow Maintain low fat, low sodium, diabetic & renal diabetic diet.  Daily weights & showers. Do not exceed >1L/day. Choose lean meats and poultry without skin and prepare them without added saturated and trans fat.  Eat fish at least twice a week. Recent research shows that eating oily fish containing omega-3 fatty acids (for example, salmon, trout and herring) may help lower your risk of death from coronary artery disease.  Select fat-free, 1 percent fat and low-fat dairy products.  Cut back on foods containing partially hydrogenated vegetable oils to reduce trans fat in your diet.   To lower cholesterol, reduce saturated fat to no more than 5 to 6 percent of total calories. For someone eating 2,000 calories a day, that’s about 13 grams of saturated fat.  Cut back on beverages and foods with added sugars.  Choose and prepare foods with little or no salt. To lower blood pressure, aim to eat no more than 2,400 milligrams of sodium per day. Reducing daily intake to 1,500 mg is desirable because it can lower blood pressure even further.  If you drink alcohol, drink in moderation. That means one drink per day if you’re a woman and two drinks  per day if you’re a man.  Follow the American Heart Association recommendations when you eat out, and keep an eye on your portion sizes. 1. Daily Shower  2. Weight yourself daily and notify any weight gain greater than 2-3 pounds in 24 hours.  3. Regular diet - low fat, low cholesterol, no added salt.  4. Cleanse right femoral incision daily while showering with warm water and mild soap (DOVE & IVORY), pat dry and maintain open to air.   DO NOT APPLY ANY LOTION, CREAMS, OR POWDERS TO INCISIONS, KEEP OPEN TO AIR  5. No driving until cleared by MD.   6. No heavy lifting nothing greater than 5 pounds until cleared by MD.   7. Call / Notify MD any fever greater than 101.0  8. Increase Activity as tolerated.  9. Utilize heart pillow to splint pillow

## 2017-03-27 NOTE — DISCHARGE NOTE ADULT - MEDICATION SUMMARY - MEDICATIONS TO TAKE
I will START or STAY ON the medications listed below when I get home from the hospital:    aspirin 325 mg oral delayed release tablet  -- 1 tab(s) by mouth once a day  -- Indication: For Antiplatelet    acetaminophen 325 mg oral tablet  -- 2 tab(s) by mouth every 6 hours, As needed, Moderate Pain (4 - 6)  -- Indication: For Pain management    calcium carbonate 500 mg (200 mg elemental calcium) oral tablet, chewable  -- 1 tab(s) by mouth once a day  -- Indication: For Antacids    clopidogrel 75 mg oral tablet  -- 1 tab(s) by mouth once a day  -- Indication: For Antiplatelet    Xanax 0.25 mg oral tablet  --  by mouth once a day (at bedtime)  -- Indication: For Anxiety    atenolol 25 mg oral tablet  -- 1 tab(s) by mouth once a day  -- Indication: For Hypertension    furosemide 20 mg oral tablet  -- 1 tab(s) by mouth once a day  -- Indication: For Diuretic    ferrous sulfate 325 mg (65 mg elemental iron) oral tablet  -- 1 tab(s) by mouth 3 times a day  -- Indication: For Anemia associated with stage 4 chronic renal failure    docusate sodium 100 mg oral capsule  -- 1 cap(s) by mouth 3 times a day  -- Indication: For laxative    senna oral tablet  -- 2 tab(s) by mouth once a day (at bedtime)  -- Indication: For laxative    timolol hemihydrate 0.5% ophthalmic solution  --  to each affected eye LEFT EYE  -- Indication: For Glaucoma    Primadophilus Bifidus oral capsule  -- 1 cap(s) by mouth once a day  -- Indication: For Probiotics    pantoprazole 40 mg oral delayed release tablet  -- 1 tab(s) by mouth once a day (before a meal)  -- Indication: For GERD    multivitamin  --   once a day  -- Indication: For vitamin    Vitamin D3 5000 intl units oral tablet  -- 1 tab(s) by mouth once a day  -- Indication: For vitamin    Vitamin C 500 mg oral tablet  -- 1 tab(s) by mouth once a day  -- Indication: For vitamin

## 2017-03-27 NOTE — PROGRESS NOTE ADULT - ASSESSMENT
1.CKD -4 ( Hypoperfused Kidneys )   2.Renal Anemia  3.AS    P : CT w. Modified Renal Protocol,     Optimized w. Ref. to IV Volume,    Discussed w. Danna ( CTS ) & Radha,
89 year old female POD #1 TAVR, required post op pacing via TVP wire in groin due to sinus bradycardia. Remained without pacing throughout the night. Had two episodes overnight of accelerated junctional rhythm in the 60s with stable BP and then converted back to NSR. Otherwise, hemodynamically stable without complaints. F/U renal regarding CKD.
89 year old female With PMH Severe AS, Chronic Diastolic CHF, NSTEMI (1/25/17), Non obstructive CAD, HTN, Afib, Severe Pulm HTN, CKD, Renal cysts, Anemia, Diverticulosis, anxiety, bl cataract surgery,  and a right thyroid nodule (2cm).  Presented 3/20 with SOB and acute on chronic diastolic heart failure.  3/22 JOHN Severe AS/Mod MR.      3/24 s/p TF-TAVR. Trace Paravalvular leak.    Post operative Course:  Required post op pacing via TVP wire in groin due to sinus bradycardia. Had two episodes of accelerated junctional rhythm in the 60s with stable BP and then converted back to NSR. 3/25  TTE: Stable.  6 beats NSVT.
89 year old female with PMH of known severe AS, non-obstructive CAD, HTN, NSTEMI, stage 4 CKD, acute on chronic diastolic heart failure, HTN, PAF (only on aspirin and plavix, no AC), bruises easily, renal cysts, chronic anemia, divertiulosis, cholecystectomy, hysterectomy, right thyroid nodule (2cm), OA, anxiety, & bilateral cataract surgery. Pt presented to ED on 3/20/17 with complaints of worsening dyspnea on exertion over past few days. Pt also reported one episode of urinary incontinence and dizziness a few days ago which self-resolved. JOHN indicated severe aortic stenosis & moderate mitral regurgitation. Pt. is s/p TF-TAVR via right cutdown (Micky S3) on 3/24/17. No post-op complications noted.
89 year old female with PMH of known severe AS, non-obstructive CAD, HTN, NSTEMI, stage 4 CKD, chronic diastolic heart failure, HTN, PAF (only on aspirin and plavix, no AC), bruises easily. Pt presented to ED on 3/20/17 with complaints of worsening dyspnea on exertion over past few days. Pt also reported one episode of urinary incontinence and dizziness a few days ago which self-resolved.
89 year old female with PMH of known severe AS, non-obstructive CAD, HTN, NSTEMI, stage 4 CKD, chronic diastolic heart failure, HTN, PAF (only on aspirin and plavix, no AC), bruises easily. Pt presented to ED on 3/20/17 with complaints of worsening dyspnea on exertion over past few days. Pt also reported one episode of urinary incontinence and dizziness a few days ago which self-resolved.
A & P : 89 year old female With PMH Severe AS, Chronic Diastolic CHF, NSTEMI (1/25/17), Non obstructive CAD, HTN, Afib, Severe Pulm HTN, CKD, Renal cysts, Anemia, Diverticulosis, anxiety, bl cataract surgery,  and a right thyroid nodule (2cm).  Presented 3/20 with SOB and acute on chronic diastolic heart failure.  3/22 JHON Severe AS/Mod MR.      3/24 s/p TF-TAVR. Trace Paravalvular leak.    Post operative Course:  Required post op pacing via TVP wire in groin due to sinus bradycardia. Had two episodes of accelerated junctional rhythm in the 60s with stable BP and then converted back to NSR. 3/25  TTE: Stable.  6 beats NSVT.
A & P : 89 year old female with PMH of known severe AS, non-obstructive CAD, HTN, NSTEMI, stage 4 CKD, chronic diastolic heart failure, HTN, PAF (only on aspirin and plavix, no AC), presented to ED on 3/20/17 with complaints of worsening dyspnea on exertion over past few days.     Pt also reported one episode of urinary incontinence and dizziness a few days ago which self-resolved.
A & P : Successful TAVR,    GFR improved , since ( W. Increased  perfusion )    No Complications,

## 2017-03-27 NOTE — DISCHARGE NOTE ADULT - ADDITIONAL INSTRUCTIONS
1. Daily Shower  2. Weight yourself daily and notify any weight gain greater than 2-3 pounds in 24 hours.  3. Regular diet - low fat, low cholesterol, no added salt.  4. Cleanse midsternal incision & right femoral incision daily while showering with warm water and mild soap (DOVE or IVORY), pat dry and maintain open to air.   DO NOT APPLY ANY LOTION, CREAMS, OR POWDERS TO INCISIONS, KEEP OPEN TO AIR  5. No driving until cleared by MD.   6. No heavy lifting nothing greater than 5 pounds until cleared by MD.   7. Call / Notify MD any fever greater than 101.0  8. Increase Activity as tolerated.  9. Utilize heart pillow to splint pillow

## 2017-03-27 NOTE — DISCHARGE NOTE ADULT - HOSPITAL COURSE
89 year old female with a PMH severe aortic stenosis, chronic diastolic CHF, non-obstructive CAD, HTN, A-fib, chronic kidney disease, renal cysts, chronic anemia, divertiulosis, cholecystectomy, hysterectomy, right thyroid nodule (2cm), OA, anxiety, & bilateral cataract surgery.   Pt. was admitted on 3/20/17 with shortness of breath & acute on chronic diastolic CHF; JOHN indicated severe aortic stenosis & moderate mitral regurgitation. Pt. is s/p TF-TAVR via right cutdown (Micky S3) on 3/24/17. No post-op complications noted.

## 2017-03-27 NOTE — DISCHARGE NOTE ADULT - MEDICATION SUMMARY - MEDICATIONS TO STOP TAKING
I will STOP taking the medications listed below when I get home from the hospital:    Arginine  --  by mouth once a day

## 2017-03-27 NOTE — DISCHARGE NOTE ADULT - CARE PROVIDER_API CALL
Sage Fields), Surgery; Thoracic and Cardiac Surgery  [address] [place] [address]  Phone: [phone]  Fax: [phone]

## 2017-03-27 NOTE — DISCHARGE NOTE ADULT - NS AS ACTIVITY OBS
Do not make important decisions/Stairs allowed/Showering allowed/Do not drive or operate machinery/Sex allowed/No Heavy lifting/straining/Walking-Indoors allowed/Walking-Outdoors allowed

## 2017-03-27 NOTE — PROGRESS NOTE ADULT - PROBLEM SELECTOR PLAN 4
Epogen - Keep Hgb > 10 gms.,  Trend BUN/Creat.
Optimized, On AMY ,
Renal consult appreciated.  Con't Epogen as per renal.  Trend BUN/Creat.
Now in RSR
Renal consult appreciated.  Con't Epogen as per renal.  Trend BUN/Creat.
Trend BUN/Creat, renal following.
Will follow up with renal today. (Ilamathi).

## 2017-03-27 NOTE — CONSULT NOTE ADULT - ATTENDING COMMENTS
89 year old female with severe AS/moderate MR. Patient with increasing dyspnea. Admitted and underwent TAVR. Patient doing well with bedside therapy. Does not need acute rehab. Recommend home with home care and assistance on stairs as needed.  D/W CTS team

## 2017-03-27 NOTE — DISCHARGE NOTE ADULT - CARE PLAN
Principal Discharge DX:	Aortic stenosis, severe  Goal:	Recover from surgery  Instructions for follow-up, activity and diet:	Follow up with Dr. Fields upon discharge from rehab.  Arrange a follow up with your cardiologist in 3-4 weeks upon discharge from rehab.  Please come to ED or Call Cardio thoracic office at [phone] if Chest pain, Shortness of Breath, persistent Nausea & vomiting, oozing from wounds, 2 lb increase in weight in 24 hours.  Secondary Diagnosis:	HTN (hypertension)  Instructions for follow-up, activity and diet:	Continue Atenolol as directed.  Secondary Diagnosis:	Prophylactic measure  Instructions for follow-up, activity and diet:	Continue SCD & increase mobility.  Continue colace & senna for bowel regimen.  Secondary Diagnosis:	CKD (chronic kidney disease) stage 4, GFR 15-29 ml/min  Instructions for follow-up, activity and diet:	Epogen as per renal. trend BUN/Creat  Secondary Diagnosis:	Acute on chronic diastolic heart failure  Instructions for follow-up, activity and diet:	Continue lasix  Secondary Diagnosis:	Paroxysmal atrial fibrillation  Instructions for follow-up, activity and diet:	No anticoagulation at this time, continue ASA & Plavix Principal Discharge DX:	Aortic stenosis, severe  Goal:	Recover from surgery  Instructions for follow-up, activity and diet:	Follow up with Dr. Fields upon discharge from rehab.  Arrange a follow up with your cardiologist in 3-4 weeks upon discharge from rehab.  Please come to ED or Call Cardio thoracic office at [phone] if Chest pain, Shortness of Breath, persistent Nausea & vomiting, oozing from wounds, 2 lb increase in weight in 24 hours.  Secondary Diagnosis:	HTN (hypertension)  Instructions for follow-up, activity and diet:	Continue Atenolol as directed.  Secondary Diagnosis:	Prophylactic measure  Instructions for follow-up, activity and diet:	Continue SCD & increase mobility.  Continue colace & senna for bowel regimen.  Secondary Diagnosis:	CKD (chronic kidney disease) stage 4, GFR 15-29 ml/min  Instructions for follow-up, activity and diet:	Epogen as per renal. trend BUN/Creat  Secondary Diagnosis:	Acute on chronic diastolic heart failure  Instructions for follow-up, activity and diet:	Continue lasix  Secondary Diagnosis:	Paroxysmal atrial fibrillation  Instructions for follow-up, activity and diet:	No anticoagulation at this time, continue ASA & Plavix Principal Discharge DX:	Aortic stenosis, severe  Goal:	Recover from surgery  Instructions for follow-up, activity and diet:	Follow up with Dr. Fields on 4/18/17 at 12:45pm  Arrange a follow up with your cardiologist in 3-4 weeks upon discharge from rehab.  Please come to ED or Call Cardio thoracic office at [phone] if Chest pain, Shortness of Breath, persistent Nausea & vomiting, oozing from wounds, 2 lb increase in weight in 24 hours.  Secondary Diagnosis:	HTN (hypertension)  Instructions for follow-up, activity and diet:	Continue Atenolol as directed.  Secondary Diagnosis:	Prophylactic measure  Instructions for follow-up, activity and diet:	Continue SCD & increase mobility.  Continue colace & senna for bowel regimen.  Secondary Diagnosis:	CKD (chronic kidney disease) stage 4, GFR 15-29 ml/min  Instructions for follow-up, activity and diet:	Epogen as per renal. trend BUN/Creat  Secondary Diagnosis:	Acute on chronic diastolic heart failure  Instructions for follow-up, activity and diet:	Continue lasix as directed.  Secondary Diagnosis:	Paroxysmal atrial fibrillation  Instructions for follow-up, activity and diet:	No anticoagulation at this time, continue ASA & Plavix

## 2017-03-27 NOTE — PROGRESS NOTE ADULT - SUBJECTIVE AND OBJECTIVE BOX
Subjective: "I feel great, don't really care for the food."  Pt. OOB to chair, denies any SOB or chest pain, NAD noted.    Tele: Sinus rhythm   Vital Signs Last 24 Hrs  T(C): 36.7, Max: 36.8 (03-26 @ 20:00)  T(F): 98, Max: 98.2 (03-26 @ 20:00)  HR: 66 (62 - 80)  BP: 122/56 (118/60 - 180/70)  BP(mean): --  RR: 17 (14 - 17)  SpO2: 98% (97% - 98%)                  LV EF:60%    27 Mar 2017 05:11    139    |  104    |  57.0   ----------------------------<  88     3.8     |  24.0   |  1.90     Ca    7.9        27 Mar 2017 05:11  Phos  2.1       27 Mar 2017 05:11  Mg     2.3       27 Mar 2017 05:11                               8.4    6.9   )-----------( 175      ( 27 Mar 2017 05:11 )             26.0   CXR:PROCEDURE DATE:  03/27/2017        INTERPRETATION:  CHEST AP PORTABLE:    History: s/p TAVR.     Date and time of exam: 3/27/2017 4:36 AM.    Technique: A single AP view of the chest was obtained.    Comparison exam: 3/26/2017 5:07 AM.    Findings:  Small residual right pleural effusion. Resolution of right lung base   infiltrate. Improvement in aeration at the left lung base. No residual   left basilar atelectasis or infiltrates noted.The heart remains   enlarged. Again noted is a prosthetic aortic valve..    Impression:  Small right pleural effusion. Resolution of bibasilar atelectasis and/or   infiltrates..    I&O's Detail  I & Os for 24h ending 27 Mar 2017 07:00  =============================================  IN:    Oral Fluid: 640 ml    Oral Fluid: 480 ml    Solution: 100 ml    IV PiggyBack: 50 ml    Total IN: 1270 ml  ---------------------------------------------  OUT:    Voided: 650 ml    Total OUT: 650 ml  ---------------------------------------------  Total NET: 620 ml    I & Os for current day (as of 27 Mar 2017 11:12)  =============================================  IN:    Total IN: 0 ml  ---------------------------------------------  OUT:    Voided: 400 ml    Total OUT: 400 ml  ---------------------------------------------  Total NET: -400 ml    BOWEL MOVEMENT:  [x] YES [ ] NO      MEDICATIONS  (STANDING):  aspirin enteric coated 325milliGRAM(s) Oral daily  clopidogrel Tablet 75milliGRAM(s) Oral daily  calcium carbonate 500 mG (Tums) Chewable 1Tablet(s) Chew daily  furosemide    Tablet 20milliGRAM(s) Oral daily  multivitamin 1Tablet(s) Oral daily  ascorbic acid 500milliGRAM(s) Oral daily  epoetin audelia Injectable 6000Unit(s) SubCutaneous <User Schedule>  iron sucrose IVPB 100milliGRAM(s) IV Intermittent daily  timolol 0.5% Solution 1Drop(s) Left EYE daily  sodium chloride 0.9% lock flush 3milliLiter(s) IV Push every 8 hours  docusate sodium 100milliGRAM(s) Oral three times a day  senna 2Tablet(s) Oral at bedtime  pantoprazole    Tablet 40milliGRAM(s) Oral before breakfast  ATENolol  Tablet 25milliGRAM(s) Oral two times a day    MEDICATIONS  (PRN):  ALPRAZolam 0.125milliGRAM(s) Oral at bedtime PRN insomnia/anxiety      Physical Exam:  Neuro: A&Ox4, no focal deficit noted. Left eye with slight subconjuctival hemorrhage & suborbital ecchymosis improving.  Pulm: Clear bilaterally.  CV: RRR, +S1, +S2.  Abd: soft, non-tender, non-distended, +BS, +BM 3/26/17.  Extremities: MAEx4, trace edema x2BLE, +PP, - calf tenderness.   Incision(s): Right femoral groin dressing with 4x4 dsg & old drainage noted, 4x4 dsg removed, steri strips intact.            PAST MEDICAL & SURGICAL HISTORY:  CKD (chronic kidney disease) stage 4, GFR 15-29 ml/min  Urine frequency  NSTEMI (non-ST elevated myocardial infarction)  Atrial fibrillation  HTN (hypertension)  Orthopnea  CLIFTON (dyspnea on exertion)  CHF (congestive heart failure)  Bruises easily  Aortic stenosis  Anxiety  H/O abdominal hysterectomy  History of cholecystectomy

## 2017-03-27 NOTE — DISCHARGE NOTE ADULT - CONDITIONS AT DISCHARGE
Follow up with Dr. Fields upon discharge from rehab.  Arrange a follow up with your cardiologist in 3-4 weeks.

## 2017-03-27 NOTE — PROGRESS NOTE ADULT - PROBLEM SELECTOR PROBLEM 5
Anemia associated with stage 4 chronic renal failure
Anemia associated with stage 4 chronic renal failure
Aortic stenosis, severe
Prophylactic measure

## 2017-03-27 NOTE — PROGRESS NOTE ADULT - PROBLEM SELECTOR PLAN 5
Continue Venofer x 3 doses.  Continue Vitamin C and Epogen.
Continue Protonix for GI prophylaxis. Con't colace for bowel regimen.
Continue Venofer x 3 doses.  Continue Vitamin C and Epogen.
W.U - CT, Cardiac catheterization - Staged,    High risk for ESRD

## 2017-03-27 NOTE — DISCHARGE NOTE ADULT - VISION (WITH CORRECTIVE LENSES IF THE PATIENT USUALLY WEARS THEM):
Partially impaired: cannot see medication labels or newsprint, but can see obstacles in path, and the surrounding layout; can count fingers at arm's length/uses eye glasses

## 2017-03-27 NOTE — PROGRESS NOTE ADULT - PROBLEM SELECTOR PLAN 3
Continue Atenolol for blood pressure support.
Continue Atenolol for blood pressure support.
Continue daily home dose Lasix.    Replete electrolytes PRN.
Continue ASA & Plavix, no anticoagulation as per Dr. Anne.
Continue Atenolol for blood pressure support.
Continue daily home dose Lasix.    Replete electrolytes PRN.
Currently in NSR. Close hemodynamic monitoring. Resume atenolol at discharge.
on AMY

## 2017-03-27 NOTE — PROGRESS NOTE ADULT - PROBLEM SELECTOR PROBLEM 4
Stage 4 chronic kidney disease
CKD (chronic kidney disease) stage 4, GFR 15-29 ml/min
Paroxysmal a-fib
Stage 4 chronic kidney disease
Stage 4 chronic kidney disease

## 2017-03-27 NOTE — DISCHARGE NOTE ADULT - PATIENT PORTAL LINK FT
“You can access the [facility] Patient Portal, offered by [facility], by registering with the following website: [email or web address]”

## 2017-03-27 NOTE — DISCHARGE NOTE ADULT - PLAN OF CARE
Recover from surgery Follow up with Dr. Fields upon discharge from rehab.  Arrange a follow up with your cardiologist in 3-4 weeks upon discharge from rehab.  Please come to ED or Call Cardio thoracic office at [phone] if Chest pain, Shortness of Breath, persistent Nausea & vomiting, oozing from wounds, 2 lb increase in weight in 24 hours. Continue Atenolol as directed. Continue SCD & increase mobility.  Continue colace & senna for bowel regimen. Epogen as per renal. trend BUN/Creat Continue lasix No anticoagulation at this time, continue ASA & Plavix Follow up with Dr. Fields on 4/18/17 at 12:45pm  Arrange a follow up with your cardiologist in 3-4 weeks upon discharge from rehab.  Please come to ED or Call Cardio thoracic office at [phone] if Chest pain, Shortness of Breath, persistent Nausea & vomiting, oozing from wounds, 2 lb increase in weight in 24 hours. Continue lasix as directed.

## 2017-03-27 NOTE — DISCHARGE NOTE ADULT - MEDICATION SUMMARY - MEDICATIONS TO CHANGE
I will SWITCH the dose or number of times a day I take the medications listed below when I get home from the hospital:    UBIQUNONE  --   once a day    aspirin 81 mg oral tablet  -- 1 tab(s) by mouth once a day    atenolol 50 mg oral tablet  --  by mouth 2 times a day

## 2017-03-27 NOTE — DISCHARGE NOTE ADULT - CARE PROVIDERS DIRECT ADDRESSES
[email or web address]

## 2017-03-27 NOTE — PROGRESS NOTE ADULT - PROBLEM SELECTOR PROBLEM 1
Aortic valve stenosis, unspecified etiology
Aortic stenosis
Aortic stenosis
Paroxysmal atrial fibrillation
Acute on chronic diastolic heart failure
Aortic valve stenosis, unspecified etiology
Aortic valve stenosis, unspecified etiology
Aortic stenosis
Aortic stenosis, severe
Aortic valve stenosis, unspecified etiology
Paroxysmal atrial fibrillation
Stage 4 chronic kidney disease

## 2017-03-27 NOTE — CONSULT NOTE ADULT - SUBJECTIVE AND OBJECTIVE BOX
Patient is a 89y old female who presents with a chief complaint of SOB     HPI:  89 year old female with PMH severe AS, non-obstructive CAD, HTN, NSTEMI, stage 4 CKD, chronic diastolic heart failure, HTN, PAF (only on aspirin and plavix, no AC), bruises easily. Pt presented to ED 3/20/17 with c/o worseing CLIFTON over past few days. Pt also reported one episode of urinary incontinence and dizziness a few days ago which self-resolved. Pt denies CP, palpitations, syncope, weakness, N/V, fever/chills, cough, dysuria, abd pain, or other c/o. Of note: pt woke up on 3/17 with right eye eccymosis and subconjunctival hemmorhage. Denies trauma, sneezing, cough.    PCP:     PT/OT EVALUATION:  BED MOBILITY:   TRANSFERS:   GAIT:   ADLS:     PAST MEDICAL & SURGICAL HISTORY:  CKD (chronic kidney disease) stage 4, GFR 15-29 ml/min  Urine frequency  NSTEMI (non-ST elevated myocardial infarction)  Atrial fibrillation  HTN (hypertension)  Orthopnea  CLIFTON (dyspnea on exertion)  CHF (congestive heart failure)  Bruises easily  Aortic stenosis  Anxiety  H/O abdominal hysterectomy  History of cholecystectomy      FAMILY HISTORY:  No pertinent family history in first degree relatives      SOCIAL HISTORY:  TOBACCO: denies history  ALCOHOL: denies abuse  IVDA: denies history    FUNCTIONAL, ENVIRONMENTAL HISTORY:  WORK HISTORY:   LIVES WITH:   HOME LAYOUT:   STAIRS TO ENTER:   STAIRS INSIDE:   FUNCTIONAL HISTORY: independent with ambulation and ADLs    Allergies    Toprol-XL (Other; Short breath; Hypotension)    Intolerances    Health Shakes TID (Unknown)      MEDICATIONS  (STANDING):  aspirin enteric coated 325milliGRAM(s) Oral daily  clopidogrel Tablet 75milliGRAM(s) Oral daily  calcium carbonate 500 mG (Tums) Chewable 1Tablet(s) Chew daily  furosemide    Tablet 20milliGRAM(s) Oral daily  multivitamin 1Tablet(s) Oral daily  ascorbic acid 500milliGRAM(s) Oral daily  epoetin audelia Injectable 6000Unit(s) SubCutaneous <User Schedule>  iron sucrose IVPB 100milliGRAM(s) IV Intermittent daily  timolol 0.5% Solution 1Drop(s) Left EYE daily  sodium chloride 0.9% lock flush 3milliLiter(s) IV Push every 8 hours  docusate sodium 100milliGRAM(s) Oral three times a day  senna 2Tablet(s) Oral at bedtime  pantoprazole    Tablet 40milliGRAM(s) Oral before breakfast  ATENolol  Tablet 25milliGRAM(s) Oral two times a day    MEDICATIONS  (PRN):  ALPRAZolam 0.125milliGRAM(s) Oral at bedtime PRN insomnia/anxiety      REVIEW OF SYSTEMS:    CONSTITUTIONAL: No fever  EYES: No visual disturbances  ENMT:  No difficulty hearing, No throat pain  RESPIRATORY: No cough, No shortness of breath  CARDIOVASCULAR: No chest pain, No palpitations  GASTROINTESTINAL: No abdominal pain, No nausea, No vomiting, No diarrhea, No constipation  GENITOURINARY: No dysuria, No frequency, No incontinence  NEUROLOGICAL: No headaches, No dizziness, No loss of strength, No numbness  SKIN: No itching, No rashes  MUSCULOSKELETAL: No joint/muscle pain; No back pain  PSYCHIATRIC: No depression, No anxiety    Vital Signs Last 24 Hrs  T(C): 36.7, Max: 36.8 ( @ 20:00)  T(F): 98, Max: 98.2 ( @ 20:00)  HR: 66 (62 - 86)  BP: 122/56 (118/60 - 180/70)  BP(mean): --  RR: 17 (14 - 17)  SpO2: 98% (97% - 98%)    PHYSICAL EXAM:    GENERAL: NAD, well-groomed, well-developed  HEENT:  Atraumatic, normocephalic, conjunctiva clear, moist mucous membranes  HEART: Regular rate and rhythm, No murmurs  CHEST/LUNG: Clear to auscultation bilaterally, normal respiratory effort  ABDOMEN: Soft, Nontender, Nondistended, Bowel sounds present  EXTREMITIES:  2+ Peripheral Pulses, No edema  SKIN: No rashes or lesions  NERVOUS SYSTEM:  Alert & Oriented X3, speech intact  CNs II-XII grossly intact  Motor Strength 5/5 B/L upper and lower extremities  Sensation intact to light touch symmetrically  DTRs 2+ intact and symmetric  FUNCTIONAL EXAM:      LABS:                        8.4    6.9   )-----------( 175      ( 27 Mar 2017 05:11 )             26.0     27 Mar 2017 05:11    139    |  104    |  57.0   ----------------------------<  88     3.8     |  24.0   |  1.90     Ca    7.9        27 Mar 2017 05:11  Phos  2.1       27 Mar 2017 05:11  Mg     2.3       27 Mar 2017 05:11        Urinalysis Basic - ( 26 Mar 2017 18:11 )    Color: Yellow / Appearance: Clear / S.015 / pH: x  Gluc: x / Ketone: Negative  / Bili: Negative / Urobili: Negative mg/dL   Blood: x / Protein: 15 mg/dL / Nitrite: Negative   Leuk Esterase: Moderate / RBC: 3-5 /HPF / WBC 6-10   Sq Epi: x / Non Sq Epi: Moderate / Bacteria: x        RADIOLOGY & ADDITIONAL STUDIES: Patient is a 89y old female who presents with a chief complaint of SOB     HPI:  89 year old female with PMH severe AS, non-obstructive CAD, HTN, NSTEMI, stage 4 CKD, chronic diastolic heart failure, HTN, PAF (only on aspirin and plavix, no AC), bruises easily. Pt presented to ED 3/20/17 with c/o worseing CLIFTON over past few days. Pt also reported one episode of urinary incontinence and dizziness a few days ago which self-resolved. Pt denies CP, palpitations, syncope, weakness, N/V, fever/chills, cough, dysuria, abd pain, or other c/o. Admitted for acute on chronic diastolic heart failure. 3/22 JOHN Severe AS/Mod MR. Underwent 3/24 s/p TF-TAVR. Post operative Course:  Required post op pacing via TVP wire in groin due to sinus bradycardia. Had two episodes of accelerated junctional rhythm in the 60s with stable BP and then converted back to NSR. 3/25  TTE: Stable.    PCP:     PT/OT EVALUATION:  BED MOBILITY:   TRANSFERS:   GAIT:   ADLS:     PAST MEDICAL & SURGICAL HISTORY:  CKD (chronic kidney disease) stage 4, GFR 15-29 ml/min  Urine frequency  NSTEMI (non-ST elevated myocardial infarction)  Atrial fibrillation  HTN (hypertension)  Orthopnea  CLIFTON (dyspnea on exertion)  CHF (congestive heart failure)  Bruises easily  Aortic stenosis  Anxiety  H/O abdominal hysterectomy  History of cholecystectomy      FAMILY HISTORY:  No pertinent family history in first degree relatives      SOCIAL HISTORY:  TOBACCO: denies history  ALCOHOL: denies abuse  IVDA: denies history    FUNCTIONAL, ENVIRONMENTAL HISTORY:  WORK HISTORY:   LIVES WITH:   HOME LAYOUT:   STAIRS TO ENTER:   STAIRS INSIDE:   FUNCTIONAL HISTORY: independent with ambulation and ADLs    Allergies    Toprol-XL (Other; Short breath; Hypotension)    Intolerances    Health Shakes TID (Unknown)      MEDICATIONS  (STANDING):  aspirin enteric coated 325milliGRAM(s) Oral daily  clopidogrel Tablet 75milliGRAM(s) Oral daily  calcium carbonate 500 mG (Tums) Chewable 1Tablet(s) Chew daily  furosemide    Tablet 20milliGRAM(s) Oral daily  multivitamin 1Tablet(s) Oral daily  ascorbic acid 500milliGRAM(s) Oral daily  epoetin audelia Injectable 6000Unit(s) SubCutaneous <User Schedule>  iron sucrose IVPB 100milliGRAM(s) IV Intermittent daily  timolol 0.5% Solution 1Drop(s) Left EYE daily  sodium chloride 0.9% lock flush 3milliLiter(s) IV Push every 8 hours  docusate sodium 100milliGRAM(s) Oral three times a day  senna 2Tablet(s) Oral at bedtime  pantoprazole    Tablet 40milliGRAM(s) Oral before breakfast  ATENolol  Tablet 25milliGRAM(s) Oral two times a day    MEDICATIONS  (PRN):  ALPRAZolam 0.125milliGRAM(s) Oral at bedtime PRN insomnia/anxiety      REVIEW OF SYSTEMS:    CONSTITUTIONAL: No fever  EYES: No visual disturbances  ENMT:  No difficulty hearing, No throat pain  RESPIRATORY: No cough, No shortness of breath  CARDIOVASCULAR: No chest pain, No palpitations  GASTROINTESTINAL: No abdominal pain, No nausea, No vomiting, No diarrhea, No constipation  GENITOURINARY: No dysuria, No frequency, No incontinence  NEUROLOGICAL: No headaches, No dizziness, No loss of strength, No numbness  SKIN: No itching, No rashes  MUSCULOSKELETAL: No joint/muscle pain; No back pain  PSYCHIATRIC: No depression, No anxiety    Vital Signs Last 24 Hrs  T(C): 36.7, Max: 36.8 (- @ 20:00)  T(F): 98, Max: 98.2 (03- @ 20:00)  HR: 66 (62 - 86)  BP: 122/56 (118/60 - 180/70)  BP(mean): --  RR: 17 (14 - 17)  SpO2: 98% (97% - 98%)    PHYSICAL EXAM:    GENERAL: NAD, well-groomed, well-developed  HEENT:  Atraumatic, normocephalic, conjunctiva clear, moist mucous membranes  HEART: Regular rate and rhythm, No murmurs  CHEST/LUNG: Clear to auscultation bilaterally, normal respiratory effort  ABDOMEN: Soft, Nontender, Nondistended, Bowel sounds present  EXTREMITIES:  2+ Peripheral Pulses, No edema  SKIN: No rashes or lesions  NERVOUS SYSTEM:  Alert & Oriented X3, speech intact  CNs II-XII grossly intact  Motor Strength 5/5 B/L upper and lower extremities  Sensation intact to light touch symmetrically  DTRs 2+ intact and symmetric  FUNCTIONAL EXAM:      LABS:                        8.4    6.9   )-----------( 175      ( 27 Mar 2017 05:11 )             26.0     27 Mar 2017 05:11    139    |  104    |  57.0   ----------------------------<  88     3.8     |  24.0   |  1.90     Ca    7.9        27 Mar 2017 05:11  Phos  2.1       27 Mar 2017 05:11  Mg     2.3       27 Mar 2017 05:11        Urinalysis Basic - ( 26 Mar 2017 18:11 )    Color: Yellow / Appearance: Clear / S.015 / pH: x  Gluc: x / Ketone: Negative  / Bili: Negative / Urobili: Negative mg/dL   Blood: x / Protein: 15 mg/dL / Nitrite: Negative   Leuk Esterase: Moderate / RBC: 3-5 /HPF / WBC 6-10   Sq Epi: x / Non Sq Epi: Moderate / Bacteria: x        RADIOLOGY & ADDITIONAL STUDIES: Patient is a 89y old female who presents with a chief complaint of SOB     HPI:  89 year old female with PMH severe AS, non-obstructive CAD, HTN, NSTEMI, stage 4 CKD, chronic diastolic heart failure, HTN, PAF (only on aspirin and plavix, no AC), bruises easily. Pt presented to ED 3/20/17 with c/o worseing CLIFTON over past few days. Pt also reported one episode of urinary incontinence and dizziness a few days ago which self-resolved. Pt denies CP, palpitations, syncope, weakness, N/V, fever/chills, cough, dysuria, abd pain, or other c/o. Admitted for acute on chronic diastolic heart failure. 3/22 JOHN Severe AS/Mod MR. Underwent 3/24 s/p TF-TAVR. Post operative Course:  Required post op pacing via TVP wire in groin due to sinus bradycardia. Had two episodes of accelerated junctional rhythm in the 60s with stable BP and then converted back to NSR. 3/25  TTE: Stable.    PCP:     PT/OT EVALUATION:  BED MOBILITY: contact guard  TRANSFERS: contact guard  GAIT: contact guard 80' HHA    PAST MEDICAL & SURGICAL HISTORY:  CKD (chronic kidney disease) stage 4, GFR 15-29 ml/min  Urine frequency  NSTEMI (non-ST elevated myocardial infarction)  Atrial fibrillation  HTN (hypertension)  Orthopnea  CLIFTON (dyspnea on exertion)  CHF (congestive heart failure)  Bruises easily  Aortic stenosis  Anxiety  H/O abdominal hysterectomy  History of cholecystectomy      FAMILY HISTORY:  No pertinent family history in first degree relatives      SOCIAL HISTORY:  TOBACCO: denies history  ALCOHOL: denies abuse  IVDA: denies history    FUNCTIONAL, ENVIRONMENTAL HISTORY:  Lives alone in ; 5 stairs to enter, 15 stairs to bedroom, drives  FUNCTIONAL HISTORY: independent with ambulation and ADLs    Allergies    Toprol-XL (Other; Short breath; Hypotension)      MEDICATIONS  (STANDING):  aspirin enteric coated 325milliGRAM(s) Oral daily  clopidogrel Tablet 75milliGRAM(s) Oral daily  calcium carbonate 500 mG (Tums) Chewable 1Tablet(s) Chew daily  furosemide    Tablet 20milliGRAM(s) Oral daily  multivitamin 1Tablet(s) Oral daily  ascorbic acid 500milliGRAM(s) Oral daily  epoetin audelia Injectable 6000Unit(s) SubCutaneous <User Schedule>  iron sucrose IVPB 100milliGRAM(s) IV Intermittent daily  timolol 0.5% Solution 1Drop(s) Left EYE daily  sodium chloride 0.9% lock flush 3milliLiter(s) IV Push every 8 hours  docusate sodium 100milliGRAM(s) Oral three times a day  senna 2Tablet(s) Oral at bedtime  pantoprazole    Tablet 40milliGRAM(s) Oral before breakfast  ATENolol  Tablet 25milliGRAM(s) Oral two times a day    MEDICATIONS  (PRN):  ALPRAZolam 0.125milliGRAM(s) Oral at bedtime PRN insomnia/anxiety      REVIEW OF SYSTEMS:    CONSTITUTIONAL: No fever  RESPIRATORY: No cough, No shortness of breath  CARDIOVASCULAR: No chest pain, No palpitations  GASTROINTESTINAL: No abdominal pain, No nausea, No vomiting, No diarrhea, No constipation  GENITOURINARY: No dysuria, No frequency, No incontinence  NEUROLOGICAL: No headaches, No dizziness, No loss of strength, No numbness  SKIN: No itching, No rashes  MUSCULOSKELETAL: No joint/muscle pain; No back pain  PSYCHIATRIC: No depression, No anxiety    Vital Signs Last 24 Hrs  T(C): 36.7, Max: 36.8 (- @ 20:00)  T(F): 98, Max: 98.2 (- @ 20:00)  HR: 66 (62 - 86)  BP: 122/56 (118/60 - 180/70)  BP(mean): --  RR: 17 (14 - 17)  SpO2: 98% (97% - 98%)    PHYSICAL EXAM:    GENERAL: NAD  HEENT:  Atraumatic, normocephalic, conjunctiva clear, moist mucous membranes  HEART: Regular rate and rhythm, No murmurs  CHEST/LUNG: Clear to auscultation bilaterally, normal respiratory effort  ABDOMEN: Soft, Nontender, Nondistended, Bowel sounds present  EXTREMITIES:  2+ Peripheral Pulses, No edema  SKIN: No rashes or lesions  NERVOUS SYSTEM:  Alert & Oriented X3, speech intact  CNs II-XII grossly intact  Motor Strength 5/5 B/L upper and lower extremities  Sensation intact to light touch symmetrically  DTRs 2+ intact and symmetric    LABS:                        8.4    6.9   )-----------( 175      ( 27 Mar 2017 05:11 )             26.0     27 Mar 2017 05:11    139    |  104    |  57.0   ----------------------------<  88     3.8     |  24.0   |  1.90     Ca    7.9        27 Mar 2017 05:11  Phos  2.1       27 Mar 2017 05:11  Mg     2.3       27 Mar 2017 05:11        Urinalysis Basic - ( 26 Mar 2017 18:11 )    Color: Yellow / Appearance: Clear / S.015 / pH: x  Gluc: x / Ketone: Negative  / Bili: Negative / Urobili: Negative mg/dL   Blood: x / Protein: 15 mg/dL / Nitrite: Negative   Leuk Esterase: Moderate / RBC: 3-5 /HPF / WBC 6-10   Sq Epi: x / Non Sq Epi: Moderate / Bacteria: x        89 yo female with aortic insufficiency s/p TAVR with decreased functional mobility    - PT - strength and gait training, mobility  - OT - ADLs, balance  - Prec - Fall  - DVT ppx - Loveox per primary team  - Dispo - Patient is a 89y old female who presents with a chief complaint of SOB     HPI:  89 year old female with PMH severe AS, non-obstructive CAD, HTN, NSTEMI, stage 4 CKD, chronic diastolic heart failure, HTN, PAF (only on aspirin and plavix, no AC), bruises easily. Pt presented to ED 3/20/17 with c/o worseing CLIFTON over past few days. Pt also reported one episode of urinary incontinence and dizziness a few days ago which self-resolved. Pt denies CP, palpitations, syncope, weakness, N/V, fever/chills, cough, dysuria, abd pain, or other c/o. Admitted for acute on chronic diastolic heart failure. 3/22 JOHN Severe AS/Mod MR. Underwent 3/24 s/p TF-TAVR. Post operative Course:  Required post op pacing via TVP wire in groin due to sinus bradycardia. Had two episodes of accelerated junctional rhythm in the 60s with stable BP and then converted back to NSR. 3/25  TTE: Stable.    PCP:     PT/OT EVALUATION:  BED MOBILITY: contact guard  TRANSFERS: contact guard  GAIT: contact guard 75'x2, HHA    PAST MEDICAL & SURGICAL HISTORY:  CKD (chronic kidney disease) stage 4, GFR 15-29 ml/min  Urine frequency  NSTEMI (non-ST elevated myocardial infarction)  Atrial fibrillation  HTN (hypertension)  Orthopnea  CLIFTON (dyspnea on exertion)  CHF (congestive heart failure)  Bruises easily  Aortic stenosis  Anxiety  H/O abdominal hysterectomy  History of cholecystectomy      FAMILY HISTORY:  No pertinent family history in first degree relatives      SOCIAL HISTORY:  TOBACCO: denies history  ALCOHOL: denies abuse  IVDA: denies history    FUNCTIONAL, ENVIRONMENTAL HISTORY:  Lives alone in [place]; 5 stairs to enter, 15 stairs to bedroom, drives  FUNCTIONAL HISTORY: independent with ambulation and ADLs    Allergies    Toprol-XL (Other; Short breath; Hypotension)      MEDICATIONS  (STANDING):  aspirin enteric coated 325milliGRAM(s) Oral daily  clopidogrel Tablet 75milliGRAM(s) Oral daily  calcium carbonate 500 mG (Tums) Chewable 1Tablet(s) Chew daily  furosemide    Tablet 20milliGRAM(s) Oral daily  multivitamin 1Tablet(s) Oral daily  ascorbic acid 500milliGRAM(s) Oral daily  epoetin audelia Injectable 6000Unit(s) SubCutaneous <User Schedule>  iron sucrose IVPB 100milliGRAM(s) IV Intermittent daily  timolol 0.5% Solution 1Drop(s) Left EYE daily  sodium chloride 0.9% lock flush 3milliLiter(s) IV Push every 8 hours  docusate sodium 100milliGRAM(s) Oral three times a day  senna 2Tablet(s) Oral at bedtime  pantoprazole    Tablet 40milliGRAM(s) Oral before breakfast  ATENolol  Tablet 25milliGRAM(s) Oral two times a day    MEDICATIONS  (PRN):  ALPRAZolam 0.125milliGRAM(s) Oral at bedtime PRN insomnia/anxiety      REVIEW OF SYSTEMS:    CONSTITUTIONAL: No fever  RESPIRATORY: No cough, No shortness of breath  CARDIOVASCULAR: No chest pain, No palpitations  GASTROINTESTINAL: No abdominal pain, No nausea, No vomiting, No diarrhea, No constipation  GENITOURINARY: No dysuria, No frequency, No incontinence  NEUROLOGICAL: No headaches, No dizziness, No loss of strength, No numbness  SKIN: No itching, No rashes  MUSCULOSKELETAL: No joint/muscle pain; No back pain  PSYCHIATRIC: No depression, No anxiety    Vital Signs Last 24 Hrs  T(C): 36.7, Max: 36.8 (- @ 20:00)  T(F): 98, Max: 98.2 (- @ 20:00)  HR: 66 (62 - 86)  BP: 122/56 (118/60 - 180/70)  BP(mean): --  RR: 17 (14 - 17)  SpO2: 98% (97% - 98%)    PHYSICAL EXAM:    GENERAL: NAD  HEENT:  Atraumatic, normocephalic, conjunctiva clear, moist mucous membranes  HEART: Regular rate and rhythm, No murmurs  CHEST/LUNG: Clear to auscultation bilaterally, normal respiratory effort  ABDOMEN: Soft, Nontender, Nondistended, Bowel sounds present  EXTREMITIES:  2+ Peripheral Pulses, No edema  SKIN: No rashes or lesions  NERVOUS SYSTEM:  Alert & Oriented X3, speech intact  CNs II-XII grossly intact  Motor Strength 5/5 B/L upper and lower extremities  Sensation intact to light touch symmetrically  DTRs 2+ intact and symmetric    LABS:                        8.4    6.9   )-----------( 175      ( 27 Mar 2017 05:11 )             26.0     27 Mar 2017 05:11    139    |  104    |  57.0   ----------------------------<  88     3.8     |  24.0   |  1.90     Ca    7.9        27 Mar 2017 05:11  Phos  2.1       27 Mar 2017 05:11  Mg     2.3       27 Mar 2017 05:11        Urinalysis Basic - ( 26 Mar 2017 18:11 )    Color: Yellow / Appearance: Clear / S.015 / pH: x  Gluc: x / Ketone: Negative  / Bili: Negative / Urobili: Negative mg/dL   Blood: x / Protein: 15 mg/dL / Nitrite: Negative   Leuk Esterase: Moderate / RBC: 3-5 /HPF / WBC 6-10   Sq Epi: x / Non Sq Epi: Moderate / Bacteria: x        89 yo female with aortic insufficiency s/p TAVR with decreased functional mobility    - PT - strength and gait training, mobility  - OT - ADLs, balance  - Prec - Fall  - DVT ppx - Loveox per primary team  - Dispo - recommend home with home care. Patient is a 89y old female who presents with a chief complaint of SOB     HPI:  89 year old RH female with PMH severe AS, non-obstructive CAD, HTN, NSTEMI, stage 4 CKD, chronic diastolic heart failure, HTN, PAF (only on aspirin and plavix, no AC), bruises easily. Pt presented to ED 3/20/17 with c/o worseing CLIFTON over past few days. Pt also reported one episode of urinary incontinence and dizziness a few days ago which self-resolved. Pt denies CP, palpitations, syncope, weakness, N/V, fever/chills, cough, dysuria, abd pain, or other c/o. Admitted for acute on chronic diastolic heart failure. 3/22 JOHN Severe AS/Mod MR. Underwent 3/24 s/p TF-TAVR. Post operative Course:  Required post op pacing via TVP wire in groin due to sinus bradycardia. Had two episodes of accelerated junctional rhythm in the 60s with stable BP and then converted back to NSR. 3/25  TTE: Stable.    PCP: Kirsty    PT/OT EVALUATION:  BED MOBILITY: contact guard  TRANSFERS: contact guard  GAIT: contact guard 75'x2, HHA    PAST MEDICAL & SURGICAL HISTORY:  CKD (chronic kidney disease) stage 4, GFR 15-29 ml/min  Urine frequency  NSTEMI (non-ST elevated myocardial infarction)  Atrial fibrillation  HTN (hypertension)  Orthopnea  CLIFTON (dyspnea on exertion)  CHF (congestive heart failure)  Bruises easily  Aortic stenosis  Anxiety  H/O abdominal hysterectomy  History of cholecystectomy      FAMILY HISTORY:  No pertinent family history in first degree relatives      SOCIAL HISTORY:  TOBACCO: denies history  ALCOHOL: denies abuse  IVDA: denies history    FUNCTIONAL, ENVIRONMENTAL HISTORY:  Lives alone in [place]; 5 stairs to enter, 15 stairs to bedroom, drives  FUNCTIONAL HISTORY: independent with ambulation and ADLs    Allergies    Toprol-XL (Other; Short breath; Hypotension)      MEDICATIONS  (STANDING):  aspirin enteric coated 325milliGRAM(s) Oral daily  clopidogrel Tablet 75milliGRAM(s) Oral daily  calcium carbonate 500 mG (Tums) Chewable 1Tablet(s) Chew daily  furosemide    Tablet 20milliGRAM(s) Oral daily  multivitamin 1Tablet(s) Oral daily  ascorbic acid 500milliGRAM(s) Oral daily  epoetin audeila Injectable 6000Unit(s) SubCutaneous <User Schedule>  iron sucrose IVPB 100milliGRAM(s) IV Intermittent daily  timolol 0.5% Solution 1Drop(s) Left EYE daily  sodium chloride 0.9% lock flush 3milliLiter(s) IV Push every 8 hours  docusate sodium 100milliGRAM(s) Oral three times a day  senna 2Tablet(s) Oral at bedtime  pantoprazole    Tablet 40milliGRAM(s) Oral before breakfast  ATENolol  Tablet 25milliGRAM(s) Oral two times a day    MEDICATIONS  (PRN):  ALPRAZolam 0.125milliGRAM(s) Oral at bedtime PRN insomnia/anxiety      REVIEW OF SYSTEMS:    CONSTITUTIONAL: No fever  RESPIRATORY: No cough, No shortness of breath  CARDIOVASCULAR: No chest pain, No palpitations  GASTROINTESTINAL: No abdominal pain, No nausea, No vomiting, No diarrhea, No constipation  GENITOURINARY: No dysuria, No frequency, No incontinence  NEUROLOGICAL: No headaches, No dizziness, No loss of strength, No numbness  SKIN: No itching, No rashes  MUSCULOSKELETAL: No joint/muscle pain; No back pain  PSYCHIATRIC: No depression, No anxiety    Vital Signs Last 24 Hrs  T(C): 36.7, Max: 36.8 (- @ 20:00)  T(F): 98, Max: 98.2 (- @ 20:00)  HR: 66 (62 - 86)  BP: 122/56 (118/60 - 180/70)  BP(mean): --  RR: 17 (14 - 17)  SpO2: 98% (97% - 98%)    PHYSICAL EXAM:    GENERAL: NAD  HEENT:  Atraumatic, normocephalic, conjunctiva clear, moist mucous membranes  HEART: Regular rate and rhythm, No murmurs  CHEST/LUNG: Clear to auscultation bilaterally, normal respiratory effort  ABDOMEN: Soft, Nontender, Nondistended, Bowel sounds present  EXTREMITIES:  2+ Peripheral Pulses, No edema  SKIN: No rashes or lesions  NERVOUS SYSTEM:  Alert & Oriented X3, speech intact  CNs II-XII grossly intact  Motor Strength 5/5 B/L upper and lower extremities  Sensation intact to light touch symmetrically  DTRs 2+ intact and symmetric    LABS:                        8.4    6.9   )-----------( 175      ( 27 Mar 2017 05:11 )             26.0     27 Mar 2017 05:11    139    |  104    |  57.0   ----------------------------<  88     3.8     |  24.0   |  1.90     Ca    7.9        27 Mar 2017 05:11  Phos  2.1       27 Mar 2017 05:11  Mg     2.3       27 Mar 2017 05:11        Urinalysis Basic - ( 26 Mar 2017 18:11 )    Color: Yellow / Appearance: Clear / S.015 / pH: x  Gluc: x / Ketone: Negative  / Bili: Negative / Urobili: Negative mg/dL   Blood: x / Protein: 15 mg/dL / Nitrite: Negative   Leuk Esterase: Moderate / RBC: 3-5 /HPF / WBC 6-10   Sq Epi: x / Non Sq Epi: Moderate / Bacteria: x        89 yo female with aortic insufficiency s/p TAVR with decreased functional mobility    - PT - strength and gait training, mobility  - OT - ADLs, balance  - Prec - Fall  - DVT ppx - Loveox per primary team  - Dispo - recommend home with home care.

## 2017-03-27 NOTE — PROGRESS NOTE ADULT - PROBLEM SELECTOR PLAN 1
s/p TAVR. Not on any pressors. Normal rhythm. Stable.
Add lasix 20 mg IV for diuresis.
Continue ASA and Plavix.  No additional Anticoagulation as per Dr. Anne.  To Resume Atenelol at 25mg BID ,
Pre-op for TAVR.  Possibly scheduled for Friday 3/24/17.    Discussed plan with Dr. Anne at bedside.
Pre-op for TAVR.  scheduled for Friday 3/24/17.  Encourage cough & deep breath exercises, I/S, daily PT.  Discussed plan with Dr. Anne at bedside.  pre op workup complete
plan for TAVR on friday.
s/p TAVR. Not on any pressors. Normal rhythm. Stable.
Continue ASA and Plavix.  No additional Anticoagulation as per Dr. Anne.  Resume Atenelol at 25mg BID as per Dr. Anne.
Optimized
Pre-op for TAVR.  Possibly scheduled for Friday 3/24/17.  Encourage cough & deep breath exercises, I/S, daily PT.  Discussed plan with Dr. Anne at bedside.
S/P TAVR. Continue ASA and Plavix. Post-op ECHO to r/o pericardial effusion.
Tylenol PO PRN ordered for pain management.  Continue ASA & Plavix for CAD.  Encourage the use of cough & deep breathing exercises, incentive spirometry, & increase mobility, OOB to chair, daily PT.  Possible discharge to home today.  Discussed with Dr. Anne in CT surgery morning rounds.

## 2017-03-27 NOTE — DISCHARGE NOTE ADULT - SECONDARY DIAGNOSIS.
HTN (hypertension) Prophylactic measure CKD (chronic kidney disease) stage 4, GFR 15-29 ml/min Acute on chronic diastolic heart failure Paroxysmal atrial fibrillation

## 2017-03-28 RX ORDER — ATENOLOL 25 MG/1
1 TABLET ORAL
Qty: 30 | Refills: 0
Start: 2017-03-28 | End: 2017-04-27

## 2017-03-28 RX ORDER — ATENOLOL 25 MG/1
1 TABLET ORAL
Qty: 0 | Refills: 0 | DISCHARGE
Start: 2017-03-28 | End: 2017-04-27

## 2017-03-29 ENCOUNTER — RECORD ABSTRACTING (OUTPATIENT)
Age: 82
End: 2017-03-29

## 2017-03-29 RX ORDER — ATENOLOL 50 MG/1
50 TABLET ORAL
Refills: 0 | Status: DISCONTINUED | COMMUNITY
End: 2017-03-29

## 2017-03-29 RX ORDER — ASPIRIN ENTERIC COATED TABLETS 81 MG 81 MG/1
81 TABLET, DELAYED RELEASE ORAL
Refills: 0 | Status: DISCONTINUED | COMMUNITY
End: 2017-03-29

## 2017-03-29 RX ORDER — OMEGA-3S/DHA/EPA/FISH OIL 300-1000MG
CAPSULE ORAL
Refills: 0 | Status: DISCONTINUED | COMMUNITY
End: 2017-03-29

## 2017-03-29 RX ORDER — ARGININE 500 MG
TABLET ORAL
Refills: 0 | Status: DISCONTINUED | COMMUNITY
End: 2017-03-29

## 2017-04-04 RX ORDER — ATENOLOL 25 MG/1
0 TABLET ORAL
Qty: 0 | Refills: 0 | COMMUNITY

## 2017-04-04 RX ORDER — CLOPIDOGREL BISULFATE 75 MG/1
1 TABLET, FILM COATED ORAL
Qty: 0 | Refills: 0 | COMMUNITY

## 2017-04-04 RX ORDER — ARGININE 700 MG
0 CAPSULE ORAL
Qty: 0 | Refills: 0 | COMMUNITY

## 2017-04-04 RX ORDER — ASPIRIN/CALCIUM CARB/MAGNESIUM 324 MG
1 TABLET ORAL
Qty: 0 | Refills: 0 | COMMUNITY

## 2017-04-04 RX ORDER — CALCIUM CARBONATE 500(1250)
0 TABLET ORAL
Qty: 0 | Refills: 0 | COMMUNITY

## 2017-04-18 ENCOUNTER — APPOINTMENT (OUTPATIENT)
Dept: CARDIOTHORACIC SURGERY | Facility: CLINIC | Age: 82
End: 2017-04-18

## 2017-04-18 VITALS
SYSTOLIC BLOOD PRESSURE: 181 MMHG | HEIGHT: 60 IN | OXYGEN SATURATION: 98 % | BODY MASS INDEX: 22.38 KG/M2 | HEART RATE: 64 BPM | RESPIRATION RATE: 16 BRPM | WEIGHT: 114 LBS | DIASTOLIC BLOOD PRESSURE: 72 MMHG

## 2017-04-18 NOTE — COUNSELING
[Hygeine (Including Daily Shower)] : hygeine (including daily shower) [Importance of Regular Medical Follow-Up] : the importance of regular medical follow-up [Blood Pressure Control] : blood pressure control [S/S of infection] : signs and symptoms of infection (and to whom it should be reported) [Progressive Ambulation/Activity] : progressive ambulation/activity [Medication/Vitamin/Herb/Food Interaction] : medication/vitamin/herb/food interaction [SBE antibiotic prophylaxis] : SBE antibiotic prophylaxis was recommended

## 2017-04-18 NOTE — PHYSICAL EXAM
[] : no respiratory distress [Auscultation Breath Sounds / Voice Sounds] : lungs were clear to auscultation bilaterally [Heart Rate And Rhythm] : heart rate was normal and rhythm regular [Clean] : clean [Dry] : dry [Healing Well] : healing well [No Edema] : no edema

## 2017-04-21 PROBLEM — R06.01 ORTHOPNEA: Chronic | Status: ACTIVE | Noted: 2017-03-10

## 2017-04-21 PROBLEM — R35.0 FREQUENCY OF MICTURITION: Chronic | Status: ACTIVE | Noted: 2017-03-10

## 2017-04-21 PROBLEM — I35.0 NONRHEUMATIC AORTIC (VALVE) STENOSIS: Chronic | Status: ACTIVE | Noted: 2017-03-10

## 2017-04-21 PROBLEM — R06.09 OTHER FORMS OF DYSPNEA: Chronic | Status: ACTIVE | Noted: 2017-03-10

## 2017-04-21 PROBLEM — I21.4 NON-ST ELEVATION (NSTEMI) MYOCARDIAL INFARCTION: Chronic | Status: ACTIVE | Noted: 2017-03-10

## 2017-04-21 PROBLEM — F41.9 ANXIETY DISORDER, UNSPECIFIED: Chronic | Status: ACTIVE | Noted: 2017-03-10

## 2017-04-21 PROBLEM — I10 ESSENTIAL (PRIMARY) HYPERTENSION: Chronic | Status: ACTIVE | Noted: 2017-03-10

## 2017-04-21 PROBLEM — I50.9 HEART FAILURE, UNSPECIFIED: Chronic | Status: ACTIVE | Noted: 2017-03-10

## 2017-04-21 PROBLEM — I48.91 UNSPECIFIED ATRIAL FIBRILLATION: Chronic | Status: ACTIVE | Noted: 2017-03-10

## 2017-04-21 PROBLEM — R23.8 OTHER SKIN CHANGES: Chronic | Status: ACTIVE | Noted: 2017-03-10

## 2017-04-26 NOTE — CONSULT LETTER
[DrEdilson  ___] : Dr. ROSA [Dear  ___] : Dear  [unfilled], [Consult Letter:] : I had the pleasure of evaluating your patient, [unfilled]. [Please see my note below.] : Please see my note below. [Consult Closing:] : Thank you very much for allowing me to participate in the care of this patient.  If you have any questions, please do not hesitate to contact me. [Sincerely,] : Sincerely, [FreeTextEntry2] : Dr Venkatesh Tuttle\par [address]\[place] [address]\par  [Guillermo Anne MD] : Guillermo Anne MD [Chief] : Chief [[facility]] : [facility]

## 2017-05-11 ENCOUNTER — APPOINTMENT (OUTPATIENT)
Dept: NEPHROLOGY | Facility: CLINIC | Age: 82
End: 2017-05-11

## 2017-05-11 DIAGNOSIS — F41.9 ANXIETY DISORDER, UNSPECIFIED: ICD-10-CM

## 2017-05-11 DIAGNOSIS — Z87.898 PERSONAL HISTORY OF OTHER SPECIFIED CONDITIONS: ICD-10-CM

## 2017-05-11 DIAGNOSIS — R35.0 FREQUENCY OF MICTURITION: ICD-10-CM

## 2017-05-11 LAB — HEMOGLOBIN: 10.9

## 2017-05-11 RX ORDER — SENNOSIDES 8.6 MG TABLETS 8.6 MG/1
TABLET ORAL
Refills: 0 | Status: DISCONTINUED | COMMUNITY
End: 2017-05-11

## 2017-05-11 RX ORDER — B-COMPLEX WITH VITAMIN C
TABLET ORAL
Refills: 0 | Status: DISCONTINUED | COMMUNITY
End: 2017-05-11

## 2017-05-11 RX ORDER — BACILLUS COAGULANS/INULIN 1B-250 MG
CAPSULE ORAL
Refills: 0 | Status: DISCONTINUED | COMMUNITY
End: 2017-05-11

## 2017-05-11 RX ORDER — CHLORHEXIDINE GLUCONATE 4 %
325 (65 FE) LIQUID (ML) TOPICAL
Refills: 0 | Status: DISCONTINUED | COMMUNITY
End: 2017-05-11

## 2017-05-11 RX ORDER — ASPIRIN 325 MG/1
325 TABLET ORAL
Refills: 0 | Status: DISCONTINUED | COMMUNITY
End: 2017-05-11

## 2017-05-11 RX ORDER — CALCIUM CARBONATE 650 MG
650 TABLET ORAL
Refills: 0 | Status: DISCONTINUED | COMMUNITY
End: 2017-05-11

## 2017-05-11 RX ORDER — UBIDECARENONE/VIT E ACET 100MG-5
CAPSULE ORAL
Refills: 0 | Status: DISCONTINUED | COMMUNITY
End: 2017-05-11

## 2017-05-11 RX ORDER — ACETAMINOPHEN 325 MG/1
325 TABLET, FILM COATED ORAL
Refills: 0 | Status: DISCONTINUED | COMMUNITY
End: 2017-05-11

## 2017-05-11 RX ADMIN — ERYTHROPOIETIN 0 UNIT/ML: 10000 INJECTION, SOLUTION INTRAVENOUS; SUBCUTANEOUS at 00:00

## 2017-05-12 LAB
25(OH)D3 SERPL-MCNC: 71.7 NG/ML
ALBUMIN SERPL ELPH-MCNC: 4.2 G/DL
ANION GAP SERPL CALC-SCNC: 19 MMOL/L
BASOPHILS # BLD AUTO: 0.05 K/UL
BASOPHILS NFR BLD AUTO: 0.8 %
BUN SERPL-MCNC: 56 MG/DL
CALCIUM SERPL-MCNC: 10 MG/DL
CALCIUM SERPL-MCNC: 10 MG/DL
CHLORIDE SERPL-SCNC: 96 MMOL/L
CO2 SERPL-SCNC: 23 MMOL/L
CREAT SERPL-MCNC: 1.92 MG/DL
EOSINOPHIL # BLD AUTO: 0.25 K/UL
EOSINOPHIL NFR BLD AUTO: 4 %
GLUCOSE SERPL-MCNC: 108 MG/DL
HCT VFR BLD CALC: 33.3 %
HGB BLD-MCNC: 11 G/DL
IMM GRANULOCYTES NFR BLD AUTO: 0.2 %
LYMPHOCYTES # BLD AUTO: 1.62 K/UL
LYMPHOCYTES NFR BLD AUTO: 26 %
MAN DIFF?: NORMAL
MCHC RBC-ENTMCNC: 31.5 PG
MCHC RBC-ENTMCNC: 33 GM/DL
MCV RBC AUTO: 95.4 FL
MONOCYTES # BLD AUTO: 0.42 K/UL
MONOCYTES NFR BLD AUTO: 6.7 %
NEUTROPHILS # BLD AUTO: 3.89 K/UL
NEUTROPHILS NFR BLD AUTO: 62.3 %
PARATHYROID HORMONE INTACT: 17 PG/ML
PHOSPHATE SERPL-MCNC: 3.9 MG/DL
PLATELET # BLD AUTO: 242 K/UL
POTASSIUM SERPL-SCNC: 4.7 MMOL/L
RBC # BLD: 3.49 M/UL
RBC # FLD: 13.4 %
SODIUM SERPL-SCNC: 138 MMOL/L
WBC # FLD AUTO: 6.24 K/UL

## 2017-05-18 ENCOUNTER — NON-APPOINTMENT (OUTPATIENT)
Age: 82
End: 2017-05-18

## 2017-05-18 ENCOUNTER — APPOINTMENT (OUTPATIENT)
Dept: CARDIOLOGY | Facility: CLINIC | Age: 82
End: 2017-05-18

## 2017-05-18 VITALS
DIASTOLIC BLOOD PRESSURE: 72 MMHG | BODY MASS INDEX: 22.39 KG/M2 | SYSTOLIC BLOOD PRESSURE: 170 MMHG | OXYGEN SATURATION: 97 % | WEIGHT: 114.64 LBS | HEART RATE: 72 BPM

## 2017-05-18 VITALS — SYSTOLIC BLOOD PRESSURE: 158 MMHG | DIASTOLIC BLOOD PRESSURE: 71 MMHG | HEART RATE: 71 BPM

## 2017-05-18 DIAGNOSIS — R93.8 ABNORMAL FINDINGS ON DIAGNOSTIC IMAGING OF OTHER SPECIFIED BODY STRUCTURES: ICD-10-CM

## 2017-06-07 RX ORDER — PANTOPRAZOLE SODIUM 40 MG/1
40 TABLET, DELAYED RELEASE ORAL
Qty: 1 | Refills: 1 | Status: DISCONTINUED | COMMUNITY
End: 2017-06-07

## 2017-06-07 RX ORDER — NUT.TX.GLUCOSE INTOLERANCE,SOY
BAR ORAL TWICE DAILY
Qty: 60 | Refills: 5 | Status: DISCONTINUED | COMMUNITY
Start: 2017-05-11 | End: 2017-06-07

## 2017-06-07 RX ORDER — SIMVASTATIN 5 MG/1
5 TABLET, FILM COATED ORAL
Qty: 90 | Refills: 3 | Status: DISCONTINUED | COMMUNITY
Start: 2017-05-18 | End: 2017-06-07

## 2017-06-07 RX ORDER — FUROSEMIDE 20 MG/1
20 TABLET ORAL DAILY
Qty: 90 | Refills: 0 | Status: DISCONTINUED | COMMUNITY
End: 2017-06-07

## 2017-06-08 ENCOUNTER — APPOINTMENT (OUTPATIENT)
Dept: NEPHROLOGY | Facility: CLINIC | Age: 82
End: 2017-06-08

## 2017-06-08 VITALS — BODY MASS INDEX: 21.68 KG/M2 | SYSTOLIC BLOOD PRESSURE: 130 MMHG | DIASTOLIC BLOOD PRESSURE: 76 MMHG | WEIGHT: 111 LBS

## 2017-06-08 LAB — HEMOGLOBIN: 10.9

## 2017-06-08 RX ORDER — ERYTHROPOIETIN 10000 [IU]/ML
10000 INJECTION, SOLUTION INTRAVENOUS; SUBCUTANEOUS
Qty: 1 | Refills: 0 | Status: COMPLETED | OUTPATIENT
Start: 2017-05-11

## 2017-06-09 LAB
ALBUMIN SERPL ELPH-MCNC: 4.3 G/DL
ANION GAP SERPL CALC-SCNC: 19 MMOL/L
BUN SERPL-MCNC: 41 MG/DL
CALCIUM SERPL-MCNC: 10 MG/DL
CHLORIDE SERPL-SCNC: 99 MMOL/L
CO2 SERPL-SCNC: 23 MMOL/L
CREAT SERPL-MCNC: 1.78 MG/DL
GLUCOSE SERPL-MCNC: 87 MG/DL
PHOSPHATE SERPL-MCNC: 4.2 MG/DL
POTASSIUM SERPL-SCNC: 5.1 MMOL/L
SODIUM SERPL-SCNC: 141 MMOL/L

## 2017-06-28 ENCOUNTER — EMERGENCY (EMERGENCY)
Facility: HOSPITAL | Age: 82
LOS: 1 days | Discharge: HOME | End: 2017-06-28
Attending: EMERGENCY MEDICINE
Payer: MEDICARE

## 2017-06-28 VITALS
SYSTOLIC BLOOD PRESSURE: 199 MMHG | TEMPERATURE: 98 F | OXYGEN SATURATION: 97 % | DIASTOLIC BLOOD PRESSURE: 83 MMHG | RESPIRATION RATE: 18 BRPM | HEART RATE: 73 BPM

## 2017-06-28 VITALS
SYSTOLIC BLOOD PRESSURE: 160 MMHG | HEART RATE: 70 BPM | DIASTOLIC BLOOD PRESSURE: 71 MMHG | OXYGEN SATURATION: 98 % | RESPIRATION RATE: 20 BRPM | TEMPERATURE: 98 F

## 2017-06-28 DIAGNOSIS — Z90.710 ACQUIRED ABSENCE OF BOTH CERVIX AND UTERUS: Chronic | ICD-10-CM

## 2017-06-28 DIAGNOSIS — Z90.49 ACQUIRED ABSENCE OF OTHER SPECIFIED PARTS OF DIGESTIVE TRACT: Chronic | ICD-10-CM

## 2017-06-28 PROCEDURE — 72125 CT NECK SPINE W/O DYE: CPT | Mod: 26

## 2017-06-28 PROCEDURE — 99284 EMERGENCY DEPT VISIT MOD MDM: CPT

## 2017-06-28 PROCEDURE — 72125 CT NECK SPINE W/O DYE: CPT

## 2017-06-28 PROCEDURE — 70450 CT HEAD/BRAIN W/O DYE: CPT

## 2017-06-28 PROCEDURE — 70450 CT HEAD/BRAIN W/O DYE: CPT | Mod: 26

## 2017-06-28 PROCEDURE — 99284 EMERGENCY DEPT VISIT MOD MDM: CPT | Mod: 25

## 2017-06-28 NOTE — ED POST DISCHARGE NOTE - ADDITIONAL DOCUMENTATION
pt hemodynamically stable, PIV removed, refer to flowsheet and chart, pt to be discharged, pt understood discharge instructions and plan of care

## 2017-06-28 NOTE — ED ADULT TRIAGE NOTE - CHIEF COMPLAINT QUOTE
trip and fall and 0800 this morning.  on plavix, no additional blood thinners.  Ambulatory all morning.  Hematoma present to right side forehead.  Denies LOC.  also has some lower back pain.

## 2017-06-28 NOTE — ED PROVIDER NOTE - OBJECTIVE STATEMENT
Pt. present to ED s/p fall at  home. Pt. tripped and fell backwards. Pt. hit the right side of her head against the bed. NO LOC. NO neck pain. Pt. is on plavix and was told to go to the ED. Incident occurred around 8am today. Pt. was able to get herself back up. Pt. able to walk. Pt. only c/o some soreness to her lower back which has now resolved. Pt. has bruise to right side of head.

## 2017-06-28 NOTE — ED PROVIDER NOTE - PMH
Anxiety    Aortic stenosis    Atrial fibrillation    Bruises easily    CHF (congestive heart failure)    CKD (chronic kidney disease) stage 4, GFR 15-29 ml/min    CLIFTON (dyspnea on exertion)    HTN (hypertension)    NSTEMI (non-ST elevated myocardial infarction)    Orthopnea    Urine frequency

## 2017-06-28 NOTE — ED PROVIDER NOTE - PROGRESS NOTE DETAILS
Pt. well appearing. Head and neck CT is negative for acute injury. Pt. will be discharged home. Pt. is at her normal mental baseline. Pt. does have elevated BP. Pt. is compliant with her BP meds. Pt. is asymptomatic. Family states that the patient's BP goes up when she is around doctors and in the hospital. I urged the patient and family to follow up with the PMD when they go home to have BP re-checked.

## 2017-07-13 ENCOUNTER — APPOINTMENT (OUTPATIENT)
Dept: NEPHROLOGY | Facility: CLINIC | Age: 82
End: 2017-07-13

## 2017-07-13 VITALS
WEIGHT: 111 LBS | SYSTOLIC BLOOD PRESSURE: 120 MMHG | DIASTOLIC BLOOD PRESSURE: 60 MMHG | HEIGHT: 60 IN | BODY MASS INDEX: 21.79 KG/M2

## 2017-07-13 LAB — HEMOGLOBIN: 10.7

## 2017-07-13 RX ORDER — ERYTHROPOIETIN 20000 [IU]/ML
20000 INJECTION, SOLUTION INTRAVENOUS; SUBCUTANEOUS
Qty: 1 | Refills: 0 | Status: COMPLETED | OUTPATIENT
Start: 2017-07-13

## 2017-07-13 RX ADMIN — ERYTHROPOIETIN 0 UNIT/ML: 20000 INJECTION, SOLUTION INTRAVENOUS; SUBCUTANEOUS at 00:00

## 2017-07-14 LAB
ALBUMIN SERPL ELPH-MCNC: 4.1 G/DL
ANION GAP SERPL CALC-SCNC: 16 MMOL/L
BUN SERPL-MCNC: 47 MG/DL
CALCIUM SERPL-MCNC: 9.5 MG/DL
CHLORIDE SERPL-SCNC: 100 MMOL/L
CO2 SERPL-SCNC: 19 MMOL/L
CREAT SERPL-MCNC: 1.58 MG/DL
GLUCOSE SERPL-MCNC: 89 MG/DL
PHOSPHATE SERPL-MCNC: 4.7 MG/DL
POTASSIUM SERPL-SCNC: 5 MMOL/L
SODIUM SERPL-SCNC: 135 MMOL/L

## 2017-08-25 ENCOUNTER — APPOINTMENT (OUTPATIENT)
Dept: NEPHROLOGY | Facility: CLINIC | Age: 82
End: 2017-08-25

## 2017-09-28 ENCOUNTER — APPOINTMENT (OUTPATIENT)
Dept: CARDIOLOGY | Facility: CLINIC | Age: 82
End: 2017-09-28
Payer: MEDICARE

## 2017-09-28 ENCOUNTER — RESULT REVIEW (OUTPATIENT)
Age: 82
End: 2017-09-28

## 2017-09-28 VITALS
WEIGHT: 114 LBS | OXYGEN SATURATION: 96 % | SYSTOLIC BLOOD PRESSURE: 147 MMHG | BODY MASS INDEX: 22.26 KG/M2 | HEART RATE: 70 BPM | DIASTOLIC BLOOD PRESSURE: 71 MMHG

## 2017-09-28 LAB
CHOLEST SERPL-MCNC: 167 MG/DL
CHOLEST/HDLC SERPL: 2.3 RATIO
HDLC SERPL-MCNC: 72 MG/DL
LDLC SERPL CALC-MCNC: 82 MG/DL
TRIGL SERPL-MCNC: 63 MG/DL

## 2017-09-28 PROCEDURE — 99213 OFFICE O/P EST LOW 20 MIN: CPT

## 2017-09-28 PROCEDURE — 93000 ELECTROCARDIOGRAM COMPLETE: CPT

## 2017-09-28 RX ORDER — ASPIRIN ENTERIC COATED TABLETS 81 MG 81 MG/1
81 TABLET, DELAYED RELEASE ORAL
Qty: 90 | Refills: 1 | Status: DISCONTINUED | COMMUNITY
End: 2017-09-28

## 2017-10-06 ENCOUNTER — TRANSCRIPTION ENCOUNTER (OUTPATIENT)
Age: 82
End: 2017-10-06

## 2017-10-06 ENCOUNTER — EMERGENCY (EMERGENCY)
Facility: HOSPITAL | Age: 82
LOS: 1 days | Discharge: HOME | End: 2017-10-06
Attending: EMERGENCY MEDICINE
Payer: MEDICARE

## 2017-10-06 VITALS
HEIGHT: 60 IN | SYSTOLIC BLOOD PRESSURE: 222 MMHG | DIASTOLIC BLOOD PRESSURE: 87 MMHG | OXYGEN SATURATION: 96 % | TEMPERATURE: 98 F | HEART RATE: 77 BPM | WEIGHT: 111.99 LBS | RESPIRATION RATE: 18 BRPM

## 2017-10-06 VITALS
OXYGEN SATURATION: 97 % | RESPIRATION RATE: 18 BRPM | SYSTOLIC BLOOD PRESSURE: 185 MMHG | HEART RATE: 75 BPM | DIASTOLIC BLOOD PRESSURE: 67 MMHG

## 2017-10-06 DIAGNOSIS — Z90.710 ACQUIRED ABSENCE OF BOTH CERVIX AND UTERUS: Chronic | ICD-10-CM

## 2017-10-06 DIAGNOSIS — Z95.3 PRESENCE OF XENOGENIC HEART VALVE: Chronic | ICD-10-CM

## 2017-10-06 DIAGNOSIS — Z90.49 ACQUIRED ABSENCE OF OTHER SPECIFIED PARTS OF DIGESTIVE TRACT: Chronic | ICD-10-CM

## 2017-10-06 LAB
ALBUMIN SERPL ELPH-MCNC: 3.6 G/DL — SIGNIFICANT CHANGE UP (ref 3.3–5.2)
ALP SERPL-CCNC: 46 U/L — SIGNIFICANT CHANGE UP (ref 40–120)
ALT FLD-CCNC: 11 U/L — SIGNIFICANT CHANGE UP
ANION GAP SERPL CALC-SCNC: 17 MMOL/L — SIGNIFICANT CHANGE UP (ref 5–17)
AST SERPL-CCNC: 19 U/L — SIGNIFICANT CHANGE UP
BASOPHILS # BLD AUTO: 0 K/UL — SIGNIFICANT CHANGE UP (ref 0–0.2)
BASOPHILS NFR BLD AUTO: 0.1 % — SIGNIFICANT CHANGE UP (ref 0–2)
BILIRUB SERPL-MCNC: 0.4 MG/DL — SIGNIFICANT CHANGE UP (ref 0.4–2)
BUN SERPL-MCNC: 50 MG/DL — HIGH (ref 8–20)
CALCIUM SERPL-MCNC: 8.8 MG/DL — SIGNIFICANT CHANGE UP (ref 8.6–10.2)
CHLORIDE SERPL-SCNC: 99 MMOL/L — SIGNIFICANT CHANGE UP (ref 98–107)
CO2 SERPL-SCNC: 19 MMOL/L — LOW (ref 22–29)
CREAT SERPL-MCNC: 1.7 MG/DL — HIGH (ref 0.5–1.3)
EOSINOPHIL # BLD AUTO: 0.1 K/UL — SIGNIFICANT CHANGE UP (ref 0–0.5)
EOSINOPHIL NFR BLD AUTO: 0.7 % — SIGNIFICANT CHANGE UP (ref 0–6)
GLUCOSE SERPL-MCNC: 109 MG/DL — SIGNIFICANT CHANGE UP (ref 70–115)
HCT VFR BLD CALC: 35 % — LOW (ref 37–47)
HGB BLD-MCNC: 11 G/DL — LOW (ref 12–16)
LYMPHOCYTES # BLD AUTO: 1.2 K/UL — SIGNIFICANT CHANGE UP (ref 1–4.8)
LYMPHOCYTES # BLD AUTO: 8.5 % — LOW (ref 20–55)
MCHC RBC-ENTMCNC: 30.4 PG — SIGNIFICANT CHANGE UP (ref 27–31)
MCHC RBC-ENTMCNC: 31.4 G/DL — LOW (ref 32–36)
MCV RBC AUTO: 96.7 FL — SIGNIFICANT CHANGE UP (ref 81–99)
MONOCYTES # BLD AUTO: 1.2 K/UL — HIGH (ref 0–0.8)
MONOCYTES NFR BLD AUTO: 8.5 % — SIGNIFICANT CHANGE UP (ref 3–10)
NEUTROPHILS # BLD AUTO: 11.5 K/UL — HIGH (ref 1.8–8)
NEUTROPHILS NFR BLD AUTO: 82 % — HIGH (ref 37–73)
PLATELET # BLD AUTO: 253 K/UL — SIGNIFICANT CHANGE UP (ref 150–400)
POTASSIUM SERPL-MCNC: 4.6 MMOL/L — SIGNIFICANT CHANGE UP (ref 3.5–5.3)
POTASSIUM SERPL-SCNC: 4.6 MMOL/L — SIGNIFICANT CHANGE UP (ref 3.5–5.3)
PROT SERPL-MCNC: 7.6 G/DL — SIGNIFICANT CHANGE UP (ref 6.6–8.7)
RBC # BLD: 3.62 M/UL — LOW (ref 4.4–5.2)
RBC # FLD: 13.2 % — SIGNIFICANT CHANGE UP (ref 11–15.6)
SODIUM SERPL-SCNC: 135 MMOL/L — SIGNIFICANT CHANGE UP (ref 135–145)
WBC # BLD: 14.1 K/UL — HIGH (ref 4.8–10.8)
WBC # FLD AUTO: 14.1 K/UL — HIGH (ref 4.8–10.8)

## 2017-10-06 PROCEDURE — 80053 COMPREHEN METABOLIC PANEL: CPT

## 2017-10-06 PROCEDURE — 85027 COMPLETE CBC AUTOMATED: CPT

## 2017-10-06 PROCEDURE — 74176 CT ABD & PELVIS W/O CONTRAST: CPT | Mod: 26

## 2017-10-06 PROCEDURE — 36415 COLL VENOUS BLD VENIPUNCTURE: CPT

## 2017-10-06 PROCEDURE — 99284 EMERGENCY DEPT VISIT MOD MDM: CPT

## 2017-10-06 PROCEDURE — 99284 EMERGENCY DEPT VISIT MOD MDM: CPT | Mod: 25

## 2017-10-06 PROCEDURE — 74176 CT ABD & PELVIS W/O CONTRAST: CPT

## 2017-10-06 RX ORDER — SODIUM CHLORIDE 9 MG/ML
1000 INJECTION INTRAMUSCULAR; INTRAVENOUS; SUBCUTANEOUS ONCE
Qty: 0 | Refills: 0 | Status: COMPLETED | OUTPATIENT
Start: 2017-10-06 | End: 2017-10-06

## 2017-10-06 RX ADMIN — SODIUM CHLORIDE 1000 MILLILITER(S): 9 INJECTION INTRAMUSCULAR; INTRAVENOUS; SUBCUTANEOUS at 21:41

## 2017-10-06 NOTE — ED ADULT TRIAGE NOTE - CHIEF COMPLAINT QUOTE
pt BIBA from urgent care, pt was complaints of abd pain and increase in urination. pt noted with increase BP.

## 2017-10-06 NOTE — ED ADULT NURSE NOTE - PSH
H/O abdominal hysterectomy    History of cholecystectomy    Status post transcatheter aortic valve replacement (TAVR) using bioprosthesis

## 2017-10-06 NOTE — ED ADULT NURSE NOTE - OBJECTIVE STATEMENT
Assumed pt care @ 2030. Pt received sitting on stretcher in NAD. Pt AOx3 C/O being seen @ urgent care today and sent here for "her kidneys shutting down". Pt c/o lower abd pain and increased urination but denies any pain at this time.  Lungs CTA, RR even unlabored. Abd soft non tender, + bowel sounds x 4quads. Denies Nausea, Vomiting, Diarrhea. Skin warm, dry, color appropriate for age and race.

## 2017-10-06 NOTE — ED PROVIDER NOTE - OBJECTIVE STATEMENT
89 y/o F PSHx cholecystectomy and hysterectomy presents to ED c/o abd pain. Associated Sx urinary frequency. Went to [facility] and was told they believed she was having renal failure. Denies dysuria, hematuria, N/V/D, fever, chills, SOB, CP, difficulty breathing, HA, numbness and tingling.  Nephrologist: Dr. Gonzalez  Cardiologist: Dr. Márquez

## 2017-10-07 RX ORDER — METRONIDAZOLE 500 MG
1 TABLET ORAL
Qty: 42 | Refills: 0 | OUTPATIENT
Start: 2017-10-07 | End: 2017-10-21

## 2017-10-07 RX ORDER — AZTREONAM 2 G
1 VIAL (EA) INJECTION
Qty: 28 | Refills: 0 | OUTPATIENT
Start: 2017-10-07 | End: 2017-10-21

## 2017-10-16 ENCOUNTER — APPOINTMENT (OUTPATIENT)
Dept: CARDIOLOGY | Facility: CLINIC | Age: 82
End: 2017-10-16
Payer: MEDICARE

## 2017-10-16 PROCEDURE — 93306 TTE W/DOPPLER COMPLETE: CPT

## 2017-11-30 ENCOUNTER — APPOINTMENT (OUTPATIENT)
Dept: CARDIOLOGY | Facility: CLINIC | Age: 82
End: 2017-11-30

## 2017-12-29 ENCOUNTER — EMERGENCY (EMERGENCY)
Facility: HOSPITAL | Age: 82
LOS: 1 days | Discharge: HOME | End: 2017-12-29
Attending: EMERGENCY MEDICINE
Payer: MEDICARE

## 2017-12-29 VITALS
RESPIRATION RATE: 20 BRPM | TEMPERATURE: 98 F | OXYGEN SATURATION: 98 % | HEART RATE: 77 BPM | SYSTOLIC BLOOD PRESSURE: 156 MMHG | DIASTOLIC BLOOD PRESSURE: 77 MMHG

## 2017-12-29 VITALS
DIASTOLIC BLOOD PRESSURE: 67 MMHG | WEIGHT: 110.01 LBS | SYSTOLIC BLOOD PRESSURE: 182 MMHG | HEIGHT: 60 IN | HEART RATE: 72 BPM | OXYGEN SATURATION: 98 % | TEMPERATURE: 98 F | RESPIRATION RATE: 18 BRPM

## 2017-12-29 DIAGNOSIS — Z90.49 ACQUIRED ABSENCE OF OTHER SPECIFIED PARTS OF DIGESTIVE TRACT: Chronic | ICD-10-CM

## 2017-12-29 DIAGNOSIS — Z90.710 ACQUIRED ABSENCE OF BOTH CERVIX AND UTERUS: Chronic | ICD-10-CM

## 2017-12-29 DIAGNOSIS — Z95.3 PRESENCE OF XENOGENIC HEART VALVE: Chronic | ICD-10-CM

## 2017-12-29 LAB
ALBUMIN SERPL ELPH-MCNC: 3.9 G/DL — SIGNIFICANT CHANGE UP (ref 3.3–5.2)
ALP SERPL-CCNC: 44 U/L — SIGNIFICANT CHANGE UP (ref 40–120)
ALT FLD-CCNC: 10 U/L — SIGNIFICANT CHANGE UP
ANION GAP SERPL CALC-SCNC: 18 MMOL/L — HIGH (ref 5–17)
APPEARANCE UR: CLEAR — SIGNIFICANT CHANGE UP
APTT BLD: 29.6 SEC — SIGNIFICANT CHANGE UP (ref 27.5–37.4)
AST SERPL-CCNC: 18 U/L — SIGNIFICANT CHANGE UP
BACTERIA # UR AUTO: ABNORMAL
BASOPHILS # BLD AUTO: 0 K/UL — SIGNIFICANT CHANGE UP (ref 0–0.2)
BASOPHILS NFR BLD AUTO: 0.3 % — SIGNIFICANT CHANGE UP (ref 0–2)
BILIRUB SERPL-MCNC: 0.2 MG/DL — LOW (ref 0.4–2)
BILIRUB UR-MCNC: NEGATIVE — SIGNIFICANT CHANGE UP
BLD GP AB SCN SERPL QL: SIGNIFICANT CHANGE UP
BUN SERPL-MCNC: 55 MG/DL — HIGH (ref 8–20)
CALCIUM SERPL-MCNC: 9.5 MG/DL — SIGNIFICANT CHANGE UP (ref 8.6–10.2)
CHLORIDE SERPL-SCNC: 102 MMOL/L — SIGNIFICANT CHANGE UP (ref 98–107)
CO2 SERPL-SCNC: 21 MMOL/L — LOW (ref 22–29)
COLOR SPEC: YELLOW — SIGNIFICANT CHANGE UP
CREAT SERPL-MCNC: 1.74 MG/DL — HIGH (ref 0.5–1.3)
DIFF PNL FLD: ABNORMAL
EOSINOPHIL # BLD AUTO: 0.1 K/UL — SIGNIFICANT CHANGE UP (ref 0–0.5)
EOSINOPHIL NFR BLD AUTO: 1.7 % — SIGNIFICANT CHANGE UP (ref 0–6)
EPI CELLS # UR: SIGNIFICANT CHANGE UP
GLUCOSE SERPL-MCNC: 83 MG/DL — SIGNIFICANT CHANGE UP (ref 70–115)
GLUCOSE UR QL: NEGATIVE MG/DL — SIGNIFICANT CHANGE UP
HCT VFR BLD CALC: 34.7 % — LOW (ref 37–47)
HGB BLD-MCNC: 11.1 G/DL — LOW (ref 12–16)
INR BLD: 1.08 RATIO — SIGNIFICANT CHANGE UP (ref 0.88–1.16)
KETONES UR-MCNC: NEGATIVE — SIGNIFICANT CHANGE UP
LEUKOCYTE ESTERASE UR-ACNC: ABNORMAL
LYMPHOCYTES # BLD AUTO: 1.3 K/UL — SIGNIFICANT CHANGE UP (ref 1–4.8)
LYMPHOCYTES # BLD AUTO: 19.6 % — LOW (ref 20–55)
MCHC RBC-ENTMCNC: 30.7 PG — SIGNIFICANT CHANGE UP (ref 27–31)
MCHC RBC-ENTMCNC: 32 G/DL — SIGNIFICANT CHANGE UP (ref 32–36)
MCV RBC AUTO: 95.9 FL — SIGNIFICANT CHANGE UP (ref 81–99)
MONOCYTES # BLD AUTO: 0.8 K/UL — SIGNIFICANT CHANGE UP (ref 0–0.8)
MONOCYTES NFR BLD AUTO: 12.8 % — HIGH (ref 3–10)
NEUTROPHILS # BLD AUTO: 4.2 K/UL — SIGNIFICANT CHANGE UP (ref 1.8–8)
NEUTROPHILS NFR BLD AUTO: 65.4 % — SIGNIFICANT CHANGE UP (ref 37–73)
NITRITE UR-MCNC: NEGATIVE — SIGNIFICANT CHANGE UP
OB PNL STL: NEGATIVE — SIGNIFICANT CHANGE UP
PH UR: 6 — SIGNIFICANT CHANGE UP (ref 5–8)
PLATELET # BLD AUTO: 250 K/UL — SIGNIFICANT CHANGE UP (ref 150–400)
POTASSIUM SERPL-MCNC: 4.5 MMOL/L — SIGNIFICANT CHANGE UP (ref 3.5–5.3)
POTASSIUM SERPL-SCNC: 4.5 MMOL/L — SIGNIFICANT CHANGE UP (ref 3.5–5.3)
PROT SERPL-MCNC: 7.1 G/DL — SIGNIFICANT CHANGE UP (ref 6.6–8.7)
PROT UR-MCNC: 30 MG/DL
PROTHROM AB SERPL-ACNC: 11.9 SEC — SIGNIFICANT CHANGE UP (ref 9.8–12.7)
RBC # BLD: 3.62 M/UL — LOW (ref 4.4–5.2)
RBC # FLD: 12.6 % — SIGNIFICANT CHANGE UP (ref 11–15.6)
RBC CASTS # UR COMP ASSIST: ABNORMAL /HPF (ref 0–4)
SODIUM SERPL-SCNC: 141 MMOL/L — SIGNIFICANT CHANGE UP (ref 135–145)
SP GR SPEC: 1 — LOW (ref 1.01–1.02)
TYPE + AB SCN PNL BLD: SIGNIFICANT CHANGE UP
UROBILINOGEN FLD QL: NEGATIVE MG/DL — SIGNIFICANT CHANGE UP
WBC # BLD: 6.4 K/UL — SIGNIFICANT CHANGE UP (ref 4.8–10.8)
WBC # FLD AUTO: 6.4 K/UL — SIGNIFICANT CHANGE UP (ref 4.8–10.8)
WBC UR QL: SIGNIFICANT CHANGE UP

## 2017-12-29 PROCEDURE — 85027 COMPLETE CBC AUTOMATED: CPT

## 2017-12-29 PROCEDURE — 99283 EMERGENCY DEPT VISIT LOW MDM: CPT

## 2017-12-29 PROCEDURE — 81001 URINALYSIS AUTO W/SCOPE: CPT

## 2017-12-29 PROCEDURE — 86850 RBC ANTIBODY SCREEN: CPT

## 2017-12-29 PROCEDURE — 85730 THROMBOPLASTIN TIME PARTIAL: CPT

## 2017-12-29 PROCEDURE — 85610 PROTHROMBIN TIME: CPT

## 2017-12-29 PROCEDURE — 86901 BLOOD TYPING SEROLOGIC RH(D): CPT

## 2017-12-29 PROCEDURE — 36415 COLL VENOUS BLD VENIPUNCTURE: CPT

## 2017-12-29 PROCEDURE — 86900 BLOOD TYPING SEROLOGIC ABO: CPT

## 2017-12-29 PROCEDURE — 80053 COMPREHEN METABOLIC PANEL: CPT

## 2017-12-29 PROCEDURE — 99284 EMERGENCY DEPT VISIT MOD MDM: CPT

## 2017-12-29 PROCEDURE — 82272 OCCULT BLD FECES 1-3 TESTS: CPT

## 2017-12-29 NOTE — ED ADULT NURSE NOTE - OBJECTIVE STATEMENT
pt arrived with bloody stool, pt states stool is dark, and when she wipes and urinates she see bloody pt denies, any injury, she states she is on plavix and iron, pt at present time denies abd pain, denies chest pain

## 2017-12-29 NOTE — ED ADULT TRIAGE NOTE - CHIEF COMPLAINT QUOTE
Patient sent from Urgent care to r/o GI bleed. Patient reports dark brown stool. Patient denies any pain. Patient on Plavix for TAVR and has diverticulosis. urgent care doctor reports + hemoccult test with no external hemorrhoids noted. patient on iron supplements.

## 2017-12-29 NOTE — ED PROVIDER NOTE - OBJECTIVE STATEMENT
Pt presents to the ED with 2 epidoses of blood when she wipes. Pt presents to the ED with 2 epidoses of blood when she wipes.  She takes plavix. No abdominal pain. States takes iron also. She also noted some blood after she urinated. Currently on Levaquin 250 mg daily for a UTI her nephrologist put her on. She has chronic kidney disease.  Denies chest pain or sob.  There is no blood in toilet or surrounding stool. Pt presents to the ED with 2 epidoses of blood when she wipes.  She takes plavix and ASA.  No abdominal pain. States takes iron also. She also noted some blood after she urinated. Currently on Levaquin 250 mg daily for a UTI her nephrologist put her on. She has chronic kidney disease.  Denies chest pain or sob.  There is no blood in toilet or surrounding stool. States has hx of diverticulosis. Has had colonoscopies in the past that were normal.

## 2017-12-29 NOTE — ED PROVIDER NOTE - PROGRESS NOTE DETAILS
Pt not actively bleeding. On iron. Labs within baseline. Will refer to outpt GI. Return for any further bleeding.

## 2018-01-05 ENCOUNTER — APPOINTMENT (OUTPATIENT)
Dept: CARDIOLOGY | Facility: CLINIC | Age: 83
End: 2018-01-05
Payer: MEDICARE

## 2018-01-05 ENCOUNTER — APPOINTMENT (OUTPATIENT)
Dept: GASTROENTEROLOGY | Facility: CLINIC | Age: 83
End: 2018-01-05
Payer: MEDICARE

## 2018-01-05 VITALS — DIASTOLIC BLOOD PRESSURE: 70 MMHG | SYSTOLIC BLOOD PRESSURE: 165 MMHG

## 2018-01-05 VITALS
HEART RATE: 70 BPM | BODY MASS INDEX: 21.34 KG/M2 | HEIGHT: 61 IN | WEIGHT: 113 LBS | SYSTOLIC BLOOD PRESSURE: 194 MMHG | DIASTOLIC BLOOD PRESSURE: 80 MMHG

## 2018-01-05 VITALS
WEIGHT: 109 LBS | DIASTOLIC BLOOD PRESSURE: 60 MMHG | BODY MASS INDEX: 20.6 KG/M2 | HEART RATE: 69 BPM | SYSTOLIC BLOOD PRESSURE: 180 MMHG | OXYGEN SATURATION: 99 %

## 2018-01-05 DIAGNOSIS — N18.9 CHRONIC KIDNEY DISEASE, UNSPECIFIED: ICD-10-CM

## 2018-01-05 DIAGNOSIS — Z86.79 PERSONAL HISTORY OF OTHER DISEASES OF THE CIRCULATORY SYSTEM: ICD-10-CM

## 2018-01-05 DIAGNOSIS — I50.9 HEART FAILURE, UNSPECIFIED: ICD-10-CM

## 2018-01-05 DIAGNOSIS — D63.8 ANEMIA IN OTHER CHRONIC DISEASES CLASSIFIED ELSEWHERE: ICD-10-CM

## 2018-01-05 DIAGNOSIS — N25.0 RENAL OSTEODYSTROPHY: ICD-10-CM

## 2018-01-05 DIAGNOSIS — K64.4 RESIDUAL HEMORRHOIDAL SKIN TAGS: ICD-10-CM

## 2018-01-05 DIAGNOSIS — R06.09 OTHER FORMS OF DYSPNEA: ICD-10-CM

## 2018-01-05 DIAGNOSIS — I10 ESSENTIAL (PRIMARY) HYPERTENSION: ICD-10-CM

## 2018-01-05 PROCEDURE — 99204 OFFICE O/P NEW MOD 45 MIN: CPT

## 2018-01-05 PROCEDURE — 82270 OCCULT BLOOD FECES: CPT

## 2018-01-05 PROCEDURE — 99214 OFFICE O/P EST MOD 30 MIN: CPT

## 2018-01-05 PROCEDURE — 93000 ELECTROCARDIOGRAM COMPLETE: CPT

## 2018-01-05 RX ORDER — SULFAMETHOXAZOLE AND TRIMETHOPRIM 800; 160 MG/1; MG/1
800-160 TABLET ORAL
Qty: 28 | Refills: 0 | Status: DISCONTINUED | COMMUNITY
Start: 2017-10-07

## 2018-01-05 RX ORDER — LEVOFLOXACIN 250 MG/1
250 TABLET, FILM COATED ORAL
Qty: 5 | Refills: 0 | Status: DISCONTINUED | COMMUNITY
Start: 2017-12-28

## 2018-01-05 RX ORDER — AMOXICILLIN 500 MG/1
500 CAPSULE ORAL
Qty: 4 | Refills: 0 | Status: DISCONTINUED | COMMUNITY
Start: 2017-08-23 | End: 2018-01-05

## 2018-01-05 RX ORDER — METRONIDAZOLE 500 MG/1
500 TABLET ORAL
Qty: 42 | Refills: 0 | Status: DISCONTINUED | COMMUNITY
Start: 2017-10-07

## 2018-02-24 ENCOUNTER — EMERGENCY (EMERGENCY)
Facility: HOSPITAL | Age: 83
LOS: 1 days | Discharge: HOME | End: 2018-02-24
Attending: EMERGENCY MEDICINE
Payer: MEDICARE

## 2018-02-24 VITALS
OXYGEN SATURATION: 98 % | WEIGHT: 110.01 LBS | SYSTOLIC BLOOD PRESSURE: 144 MMHG | RESPIRATION RATE: 18 BRPM | TEMPERATURE: 98 F | HEART RATE: 76 BPM | DIASTOLIC BLOOD PRESSURE: 56 MMHG

## 2018-02-24 VITALS
SYSTOLIC BLOOD PRESSURE: 193 MMHG | OXYGEN SATURATION: 96 % | DIASTOLIC BLOOD PRESSURE: 63 MMHG | HEART RATE: 68 BPM | TEMPERATURE: 98 F

## 2018-02-24 DIAGNOSIS — Z90.49 ACQUIRED ABSENCE OF OTHER SPECIFIED PARTS OF DIGESTIVE TRACT: Chronic | ICD-10-CM

## 2018-02-24 DIAGNOSIS — Z90.710 ACQUIRED ABSENCE OF BOTH CERVIX AND UTERUS: Chronic | ICD-10-CM

## 2018-02-24 DIAGNOSIS — Z95.3 PRESENCE OF XENOGENIC HEART VALVE: Chronic | ICD-10-CM

## 2018-02-24 LAB
ALBUMIN SERPL ELPH-MCNC: 3.5 G/DL — SIGNIFICANT CHANGE UP (ref 3.3–5.2)
ALP SERPL-CCNC: 43 U/L — SIGNIFICANT CHANGE UP (ref 40–120)
ALT FLD-CCNC: 13 U/L — SIGNIFICANT CHANGE UP
ANION GAP SERPL CALC-SCNC: 14 MMOL/L — SIGNIFICANT CHANGE UP (ref 5–17)
APPEARANCE UR: CLEAR — SIGNIFICANT CHANGE UP
AST SERPL-CCNC: 26 U/L — SIGNIFICANT CHANGE UP
BACTERIA # UR AUTO: ABNORMAL
BASOPHILS # BLD AUTO: 0 K/UL — SIGNIFICANT CHANGE UP (ref 0–0.2)
BASOPHILS NFR BLD AUTO: 0.5 % — SIGNIFICANT CHANGE UP (ref 0–2)
BILIRUB SERPL-MCNC: 0.2 MG/DL — LOW (ref 0.4–2)
BILIRUB UR-MCNC: NEGATIVE — SIGNIFICANT CHANGE UP
BUN SERPL-MCNC: 50 MG/DL — HIGH (ref 8–20)
CALCIUM SERPL-MCNC: 8.9 MG/DL — SIGNIFICANT CHANGE UP (ref 8.6–10.2)
CHLORIDE SERPL-SCNC: 104 MMOL/L — SIGNIFICANT CHANGE UP (ref 98–107)
CO2 SERPL-SCNC: 23 MMOL/L — SIGNIFICANT CHANGE UP (ref 22–29)
COLOR SPEC: YELLOW — SIGNIFICANT CHANGE UP
CREAT SERPL-MCNC: 1.86 MG/DL — HIGH (ref 0.5–1.3)
DIFF PNL FLD: ABNORMAL
EOSINOPHIL # BLD AUTO: 0.1 K/UL — SIGNIFICANT CHANGE UP (ref 0–0.5)
EOSINOPHIL NFR BLD AUTO: 1.3 % — SIGNIFICANT CHANGE UP (ref 0–6)
EPI CELLS # UR: SIGNIFICANT CHANGE UP
GLUCOSE SERPL-MCNC: 166 MG/DL — HIGH (ref 70–115)
GLUCOSE UR QL: NEGATIVE MG/DL — SIGNIFICANT CHANGE UP
HCT VFR BLD CALC: 33.9 % — LOW (ref 37–47)
HGB BLD-MCNC: 11 G/DL — LOW (ref 12–16)
KETONES UR-MCNC: NEGATIVE — SIGNIFICANT CHANGE UP
LEUKOCYTE ESTERASE UR-ACNC: ABNORMAL
LYMPHOCYTES # BLD AUTO: 1.1 K/UL — SIGNIFICANT CHANGE UP (ref 1–4.8)
LYMPHOCYTES # BLD AUTO: 17.9 % — LOW (ref 20–55)
MCHC RBC-ENTMCNC: 31.4 PG — HIGH (ref 27–31)
MCHC RBC-ENTMCNC: 32.4 G/DL — SIGNIFICANT CHANGE UP (ref 32–36)
MCV RBC AUTO: 96.9 FL — SIGNIFICANT CHANGE UP (ref 81–99)
MONOCYTES # BLD AUTO: 0.5 K/UL — SIGNIFICANT CHANGE UP (ref 0–0.8)
MONOCYTES NFR BLD AUTO: 7.6 % — SIGNIFICANT CHANGE UP (ref 3–10)
NEUTROPHILS # BLD AUTO: 4.4 K/UL — SIGNIFICANT CHANGE UP (ref 1.8–8)
NEUTROPHILS NFR BLD AUTO: 72.7 % — SIGNIFICANT CHANGE UP (ref 37–73)
NITRITE UR-MCNC: NEGATIVE — SIGNIFICANT CHANGE UP
NT-PROBNP SERPL-SCNC: 4298 PG/ML — HIGH (ref 0–300)
PH UR: 6 — SIGNIFICANT CHANGE UP (ref 5–8)
PLATELET # BLD AUTO: 212 K/UL — SIGNIFICANT CHANGE UP (ref 150–400)
POTASSIUM SERPL-MCNC: 4.5 MMOL/L — SIGNIFICANT CHANGE UP (ref 3.5–5.3)
POTASSIUM SERPL-SCNC: 4.5 MMOL/L — SIGNIFICANT CHANGE UP (ref 3.5–5.3)
PROT SERPL-MCNC: 6.7 G/DL — SIGNIFICANT CHANGE UP (ref 6.6–8.7)
PROT UR-MCNC: 30 MG/DL
RBC # BLD: 3.5 M/UL — LOW (ref 4.4–5.2)
RBC # FLD: 13.5 % — SIGNIFICANT CHANGE UP (ref 11–15.6)
RBC CASTS # UR COMP ASSIST: ABNORMAL /HPF (ref 0–4)
SODIUM SERPL-SCNC: 141 MMOL/L — SIGNIFICANT CHANGE UP (ref 135–145)
SP GR SPEC: 1.01 — SIGNIFICANT CHANGE UP (ref 1.01–1.02)
TROPONIN T SERPL-MCNC: 0.03 NG/ML — SIGNIFICANT CHANGE UP (ref 0–0.06)
TSH SERPL-MCNC: 2.98 UIU/ML — SIGNIFICANT CHANGE UP (ref 0.27–4.2)
UROBILINOGEN FLD QL: NEGATIVE MG/DL — SIGNIFICANT CHANGE UP
WBC # BLD: 6 K/UL — SIGNIFICANT CHANGE UP (ref 4.8–10.8)
WBC # FLD AUTO: 6 K/UL — SIGNIFICANT CHANGE UP (ref 4.8–10.8)
WBC UR QL: >50

## 2018-02-24 PROCEDURE — 99283 EMERGENCY DEPT VISIT LOW MDM: CPT | Mod: 25

## 2018-02-24 PROCEDURE — 71045 X-RAY EXAM CHEST 1 VIEW: CPT | Mod: 26

## 2018-02-24 PROCEDURE — 84484 ASSAY OF TROPONIN QUANT: CPT

## 2018-02-24 PROCEDURE — 85027 COMPLETE CBC AUTOMATED: CPT

## 2018-02-24 PROCEDURE — 99285 EMERGENCY DEPT VISIT HI MDM: CPT

## 2018-02-24 PROCEDURE — 93005 ELECTROCARDIOGRAM TRACING: CPT

## 2018-02-24 PROCEDURE — 36415 COLL VENOUS BLD VENIPUNCTURE: CPT

## 2018-02-24 PROCEDURE — 83880 ASSAY OF NATRIURETIC PEPTIDE: CPT

## 2018-02-24 PROCEDURE — 84443 ASSAY THYROID STIM HORMONE: CPT

## 2018-02-24 PROCEDURE — 80053 COMPREHEN METABOLIC PANEL: CPT

## 2018-02-24 PROCEDURE — 71045 X-RAY EXAM CHEST 1 VIEW: CPT

## 2018-02-24 PROCEDURE — 81001 URINALYSIS AUTO W/SCOPE: CPT

## 2018-02-24 PROCEDURE — 93010 ELECTROCARDIOGRAM REPORT: CPT

## 2018-02-24 RX ORDER — CHOLECALCIFEROL (VITAMIN D3) 125 MCG
1 CAPSULE ORAL
Qty: 0 | Refills: 0 | COMMUNITY

## 2018-02-24 RX ORDER — CEPHALEXIN 500 MG
500 CAPSULE ORAL ONCE
Qty: 0 | Refills: 0 | Status: COMPLETED | OUTPATIENT
Start: 2018-02-24 | End: 2018-02-24

## 2018-02-24 RX ORDER — CEPHALEXIN 500 MG
1 CAPSULE ORAL
Qty: 14 | Refills: 0
Start: 2018-02-24 | End: 2018-03-02

## 2018-02-24 RX ORDER — L.ACIDOPH/B.ANIMALIS/B.LONGUM 15B CELL
1 CAPSULE ORAL
Qty: 0 | Refills: 0 | COMMUNITY

## 2018-02-24 RX ORDER — TIMOLOL 0.5 %
0 DROPS OPHTHALMIC (EYE)
Qty: 0 | Refills: 0 | COMMUNITY

## 2018-02-24 RX ORDER — ASPIRIN/CALCIUM CARB/MAGNESIUM 324 MG
81 TABLET ORAL ONCE
Qty: 0 | Refills: 0 | Status: COMPLETED | OUTPATIENT
Start: 2018-02-24 | End: 2018-02-24

## 2018-02-24 RX ORDER — ASCORBIC ACID 60 MG
1 TABLET,CHEWABLE ORAL
Qty: 0 | Refills: 0 | COMMUNITY

## 2018-02-24 RX ADMIN — Medication 81 MILLIGRAM(S): at 15:46

## 2018-02-24 RX ADMIN — Medication 500 MILLIGRAM(S): at 16:41

## 2018-02-24 NOTE — ED PROVIDER NOTE - OBJECTIVE STATEMENT
89F with Afib, HTN, AS s/p TAVR (3/16), presenting with palpitations this morning " felt like heart was turning over". Per daughter, she called her this morning and states that the palpitations calmed down after some time but that afterwards she noted some dyspnea and discomfort in her L jaw.  Per daughter she has a history of Afib in the past (2x) but has not had any recurrence since the TAVR. She reports taking plavix, but denies other anticoagulants. Pt states the palpitations started around 530 and thinks it may have lasted an hour.  Pt states that when she was diagnosed with Afib she did not have palpitations at that time, but just didn't feel well.  pt states palpitations today felt irregular like " bouncing around " in her. Pt denies lightheadedness or feeling faint during the episode.s. Pt took her morning medicaitons, including atenolol, hydralazine, and plavix. Pt denies any fevers/weakness/ malaise.      Cardiologist: Dr. Muniz.

## 2018-02-24 NOTE — CONSULT NOTE ADULT - SUBJECTIVE AND OBJECTIVE BOX
89F with paroxysmal Afib x 2 remote (self limiting) episodes, HTN, AS s/p TAVR (3/16), presenting with palpitations this morning described as "my heart bouncing around" associated with dyspnea and left jaw discomfort. The episode resolved spontaneously and she feels well now. SHe lives alone and normally performs all ADLs without limitation.  Patient currently denies chest pain, shortness of breath at rest or with exertion, near/true syncope, fevers/chills, N/V/D, or other cardiac or constitutional symptoms. She follows regularly with Dr. Leung. She is currently maintained on plavix only.      Echo 2017: Normal LVEF, grade II DD, moderate LVH, moderate TR, mild MR with severe MAC. PASP of 55 mmHg.     LHC 3/13/17: LM 20-30%, mid LAD 50%, D1 40%, LCX 20%, mid RCA 30%, LVEF 55%.    EKG 18 (on presentation to ED): sinus rhythm w/ intact conduction; left axis deviation; normal intervals; lateral T wave inversions (consistent w/ baseline) no acute changes.     PAST MEDICAL & SURGICAL HISTORY:  CKD (chronic kidney disease) stage 4, GFR 15-29 ml/min  Urine frequency  NSTEMI (non-ST elevated myocardial infarction)  Atrial fibrillation  HTN (hypertension)  Orthopnea  CLIFTON (dyspnea on exertion)  CHF (congestive heart failure)  Bruises easily  Aortic stenosis  Anxiety  Status post transcatheter aortic valve replacement (TAVR) using bioprosthesis  H/O abdominal hysterectomy  History of cholecystectomy      REVIEW OF SYSTEMS:  CONSTITUTIONAL: No fever, weight loss, or fatigue  EYES: No eye pain, visual disturbances, or discharge  ENMT:  No difficulty hearing, tinnitus, vertigo; No sinus or throat pain  NECK: No pain or stiffness  BREASTS: No pain, masses, or nipple discharge  RESPIRATORY: No cough, wheezing, chills or hemoptysis; No shortness of breath  CARDIOVASCULAR: No chest pain, palpitations, dizziness, or leg swelling  GASTROINTESTINAL: No abdominal or epigastric pain. No nausea, vomiting, or hematemesis; No diarrhea or constipation. No melena or hematochezia.  GENITOURINARY: No dysuria, frequency, hematuria, or incontinence  NEUROLOGICAL: No headaches, memory loss, loss of strength, numbness, or tremors  SKIN: No itching, burning, rashes, or lesions   LYMPH NODES: No enlarged glands  ENDOCRINE: No heat or cold intolerance; No hair loss  MUSCULOSKELETAL: No joint pain or swelling; No muscle, back, or extremity pain  PSYCHIATRIC: No depression, anxiety, mood swings, or difficulty sleeping  HEME/LYMPH: No easy bruising, or bleeding gums  ALLERY AND IMMUNOLOGIC: No hives or eczema      Home Medications:  Atenolol 25mg PO daily  Plavix 75mg PO daily  Hydralazine 25mg PO BID    Allergies  Toprol-XL (Other; Short breath; Hypotension)    Intolerances  [employer]akes TID (Unknown)      SOCIAL HISTORY: lives home alone      FAMILY HISTORY:  No pertinent family history in first degree relatives      Vital Signs Last 24 Hrs  T(C): 36.7 (2018 10:18), Max: 36.7 (2018 10:18)  T(F): 98.1 (2018 10:18), Max: 98.1 (2018 10:18)  HR: 76 (2018 10:18) (76 - 76)  BP: 144/56 (2018 10:18) (144/56 - 144/56)  RR: 18 (2018 10:18) (18 - 18)  SpO2: 98% (2018 10:18) (98% - 98%)    Physical Exam:  Constitutional: AAOx3, NAD  Neck: supple, No JVD  Cardiovascular: +S1/split S2 RRR, no m/g/r  Pulmonary: CTA b/l, unlabored, no wheezes, rales. rhonci  Abdomen: +BS, soft NTND  Extremities: no edema b/l, +distal pulses b/l  Neuro: non focal, speech clear, TOVAR x 4    LABS:                        11.0   6.0   )-----------( 212      ( 2018 11:48 )             33.9   02-24    141  |  104  |  50.0<H>  ----------------------------<  166<H>  4.5   |  23.0  |  1.86<H>    Ca    8.9      2018 11:48    TPro  6.7  /  Alb  3.5  /  TBili  0.2<L>  /  DBili  x   /  AST  26  /  ALT  13  /  AlkPhos  43  02-24  LIVER FUNCTIONS - ( 2018 11:48 )  Alb: 3.5 g/dL / Pro: 6.7 g/dL / ALK PHOS: 43 U/L / ALT: 13 U/L / AST: 26 U/L / GGT: x           CARDIAC MARKERS ( 2018 11:48 )  x     / 0.03 ng/mL / x     / x     / x          Urinalysis Basic - ( 2018 14:20 )    Color: Yellow / Appearance: Clear / S.010 / pH: x  Gluc: x / Ketone: Negative  / Bili: Negative / Urobili: Negative mg/dL   Blood: x / Protein: 30 mg/dL / Nitrite: Negative   Leuk Esterase: Moderate / RBC: 3-5 /HPF / WBC >50   Sq Epi: x / Non Sq Epi: Few / Bacteria: Occasional

## 2018-02-24 NOTE — CONSULT NOTE ADULT - ATTENDING COMMENTS
Pt with episode of irregular palpitation, since resolved. Has been in SR since arrival and feeling well. She has a known history of pAF. Not on anticoagulation given age and falls. On atenolol 25mg qd.   troponin negative and no ischemic changes. BNP elevated but lower than prior, and no evidence of CHF on exam.  Likely recurrent episode of atrial fibrillation, now resolved.   -Would increase atenolol to 25mg bid if tolerated  -discuss anticoagulation again as outpt, but for now would hold given prior falls.   -outpatient followup

## 2018-02-24 NOTE — ED ADULT NURSE REASSESSMENT NOTE - NS ED NURSE REASSESS COMMENT FT1
Resting comfortably in bed with daughter at bedside.  [facility] PA at bedside.  Denies any pain or discomfort this time.

## 2018-02-24 NOTE — ED PROVIDER NOTE - CARDIAC, MLM
Normal rate, regular rhythm.  Heart sounds S1, S2.  No murmurs, rubs or gallops. 2+ equal radial pulses, bilaterally; No LE edema

## 2018-02-24 NOTE — ED PROVIDER NOTE - PROGRESS NOTE DETAILS
I discussed UA results with pt and daughter. Daughter endorses some recent foul smelling urine and agrees with starting antibiotics. Awaiting cardiology recs. Pt has remained asymptomatic throughout ED stay. I reviewed patient's results with patient and allowed the opportunity for questions.  I provided the patient with anticipatory guidance, advised followup with PCP, and gave return precautions. Patient reported that they were feeling well for discharge and expressed understanding of instructions.  Patient is well for discharge.

## 2018-02-24 NOTE — ED PROVIDER NOTE - MEDICAL DECISION MAKING DETAILS
Pt with h/o Afib, presents with episode of irregular palpitations this morning, now resolved.  Pt now in NSR: plan to check labs, CXR, monitor on telemetry, obtain cardiology consult and reevalute. SYmptoms likely due to transient recurrent Afib

## 2018-02-24 NOTE — CONSULT NOTE ADULT - ASSESSMENT
89F with paroxysmal Afib x 2 remote (self limiting) episodes, HTN, AS s/p TAVR (3/16), presenting with palpitations this morning described as "my heart bouncing around" associated with dyspnea and left jaw discomfort, all of which resolved spontaneously prior to arrival at ED.     Recommendations:   No need for intervention at this time.   Pt can f/up with Dr. Pham as outpatient.   Will d/w Dr. Rich - further recs to follow.

## 2018-02-24 NOTE — ED ADULT NURSE NOTE - OBJECTIVE STATEMENT
Assumed care at 1125.  A+OX4, family at bedside.  Reports feeling palpitations with SOB today, denies any current symptoms

## 2018-03-01 ENCOUNTER — APPOINTMENT (OUTPATIENT)
Dept: CARDIOLOGY | Facility: CLINIC | Age: 83
End: 2018-03-01
Payer: MEDICARE

## 2018-03-01 VITALS — DIASTOLIC BLOOD PRESSURE: 56 MMHG | SYSTOLIC BLOOD PRESSURE: 158 MMHG

## 2018-03-01 VITALS
HEIGHT: 60 IN | WEIGHT: 114 LBS | OXYGEN SATURATION: 97 % | DIASTOLIC BLOOD PRESSURE: 52 MMHG | BODY MASS INDEX: 22.38 KG/M2 | SYSTOLIC BLOOD PRESSURE: 170 MMHG | HEART RATE: 63 BPM

## 2018-03-01 DIAGNOSIS — Z00.00 ENCOUNTER FOR GENERAL ADULT MEDICAL EXAMINATION W/OUT ABNORMAL FINDINGS: ICD-10-CM

## 2018-03-01 DIAGNOSIS — R23.8 OTHER SKIN CHANGES: ICD-10-CM

## 2018-03-01 DIAGNOSIS — I25.10 ATHEROSCLEROTIC HEART DISEASE OF NATIVE CORONARY ARTERY W/OUT ANGINA PECTORIS: ICD-10-CM

## 2018-03-01 PROCEDURE — 93000 ELECTROCARDIOGRAM COMPLETE: CPT

## 2018-03-01 PROCEDURE — 99214 OFFICE O/P EST MOD 30 MIN: CPT | Mod: 25

## 2018-04-05 ENCOUNTER — APPOINTMENT (OUTPATIENT)
Dept: CARDIOLOGY | Facility: CLINIC | Age: 83
End: 2018-04-05

## 2018-04-24 ENCOUNTER — MEDICATION RENEWAL (OUTPATIENT)
Age: 83
End: 2018-04-24

## 2018-05-18 ENCOUNTER — APPOINTMENT (OUTPATIENT)
Dept: GASTROENTEROLOGY | Facility: CLINIC | Age: 83
End: 2018-05-18
Payer: MEDICARE

## 2018-05-18 VITALS
OXYGEN SATURATION: 98 % | RESPIRATION RATE: 16 BRPM | DIASTOLIC BLOOD PRESSURE: 70 MMHG | SYSTOLIC BLOOD PRESSURE: 110 MMHG | HEIGHT: 60 IN | HEART RATE: 62 BPM | BODY MASS INDEX: 22.38 KG/M2 | WEIGHT: 114 LBS

## 2018-05-18 DIAGNOSIS — D50.0 IRON DEFICIENCY ANEMIA SECONDARY TO BLOOD LOSS (CHRONIC): ICD-10-CM

## 2018-05-18 DIAGNOSIS — K64.8 OTHER HEMORRHOIDS: ICD-10-CM

## 2018-05-18 PROCEDURE — 99213 OFFICE O/P EST LOW 20 MIN: CPT

## 2018-05-18 RX ORDER — CEPHALEXIN 500 MG/1
500 CAPSULE ORAL
Qty: 14 | Refills: 0 | Status: DISCONTINUED | COMMUNITY
Start: 2018-02-24

## 2018-06-05 ENCOUNTER — MEDICATION RENEWAL (OUTPATIENT)
Age: 83
End: 2018-06-05

## 2018-07-02 ENCOUNTER — NON-APPOINTMENT (OUTPATIENT)
Age: 83
End: 2018-07-02

## 2018-07-02 ENCOUNTER — APPOINTMENT (OUTPATIENT)
Dept: CARDIOLOGY | Facility: CLINIC | Age: 83
End: 2018-07-02
Payer: MEDICARE

## 2018-07-02 VITALS
SYSTOLIC BLOOD PRESSURE: 142 MMHG | WEIGHT: 114 LBS | OXYGEN SATURATION: 98 % | DIASTOLIC BLOOD PRESSURE: 66 MMHG | BODY MASS INDEX: 22.26 KG/M2 | HEART RATE: 66 BPM

## 2018-07-02 DIAGNOSIS — I77.9 DISORDER OF ARTERIES AND ARTERIOLES, UNSPECIFIED: ICD-10-CM

## 2018-07-02 DIAGNOSIS — I48.0 PAROXYSMAL ATRIAL FIBRILLATION: ICD-10-CM

## 2018-07-02 PROCEDURE — 93000 ELECTROCARDIOGRAM COMPLETE: CPT

## 2018-07-02 PROCEDURE — 99214 OFFICE O/P EST MOD 30 MIN: CPT

## 2018-07-02 RX ORDER — VITAMIN K2 40 MCG
40 TABLET ORAL
Refills: 0 | Status: DISCONTINUED | COMMUNITY
Start: 2017-06-07 | End: 2018-07-02

## 2018-07-02 RX ORDER — CLOPIDOGREL 75 MG/1
75 TABLET, FILM COATED ORAL DAILY
Qty: 30 | Refills: 3 | Status: DISCONTINUED | COMMUNITY
End: 2018-07-02

## 2018-08-24 PROBLEM — I77.9 CAROTID ARTERY DISEASE: Status: ACTIVE | Noted: 2017-05-18

## 2018-08-24 PROBLEM — I48.0 PAROXYSMAL ATRIAL FIBRILLATION: Status: ACTIVE | Noted: 2018-08-24

## 2018-08-24 PROBLEM — I48.0 PAROXYSMAL ATRIAL FIBRILLATION: Status: ACTIVE | Noted: 2018-03-01

## 2018-09-24 LAB
ALBUMIN SERPL ELPH-MCNC: 4 G/DL
ALP BLD-CCNC: 42 U/L
ALT SERPL-CCNC: 13 U/L
ANION GAP SERPL CALC-SCNC: 15 MMOL/L
AST SERPL-CCNC: 19 U/L
BASOPHILS # BLD AUTO: 0.04 K/UL
BASOPHILS NFR BLD AUTO: 0.6 %
BILIRUB SERPL-MCNC: 0.2 MG/DL
BUN SERPL-MCNC: 53 MG/DL
CALCIUM SERPL-MCNC: 9.3 MG/DL
CHLORIDE SERPL-SCNC: 101 MMOL/L
CO2 SERPL-SCNC: 25 MMOL/L
CREAT SERPL-MCNC: 1.93 MG/DL
EOSINOPHIL # BLD AUTO: 0.16 K/UL
EOSINOPHIL NFR BLD AUTO: 2.5 %
GLUCOSE SERPL-MCNC: 89 MG/DL
HCT VFR BLD CALC: 34.2 %
HGB BLD-MCNC: 11 G/DL
IMM GRANULOCYTES NFR BLD AUTO: 0.2 %
LYMPHOCYTES # BLD AUTO: 1.14 K/UL
LYMPHOCYTES NFR BLD AUTO: 18 %
MAN DIFF?: NORMAL
MCHC RBC-ENTMCNC: 31.1 PG
MCHC RBC-ENTMCNC: 32.2 GM/DL
MCV RBC AUTO: 96.6 FL
MONOCYTES # BLD AUTO: 0.61 K/UL
MONOCYTES NFR BLD AUTO: 9.7 %
NEUTROPHILS # BLD AUTO: 4.36 K/UL
NEUTROPHILS NFR BLD AUTO: 69 %
PLATELET # BLD AUTO: 240 K/UL
POTASSIUM SERPL-SCNC: 4.8 MMOL/L
PROT SERPL-MCNC: 6.8 G/DL
RBC # BLD: 3.54 M/UL
RBC # FLD: 13.6 %
SODIUM SERPL-SCNC: 141 MMOL/L
WBC # FLD AUTO: 6.32 K/UL

## 2018-10-22 ENCOUNTER — APPOINTMENT (OUTPATIENT)
Dept: GASTROENTEROLOGY | Facility: CLINIC | Age: 83
End: 2018-10-22

## 2018-10-22 PROBLEM — N18.4 CHRONIC KIDNEY DISEASE, STAGE 4 (SEVERE): Chronic | Status: ACTIVE | Noted: 2017-03-20

## 2018-12-19 ENCOUNTER — APPOINTMENT (OUTPATIENT)
Dept: GASTROENTEROLOGY | Facility: CLINIC | Age: 83
End: 2018-12-19
Payer: MEDICARE

## 2018-12-19 VITALS
SYSTOLIC BLOOD PRESSURE: 140 MMHG | WEIGHT: 110 LBS | DIASTOLIC BLOOD PRESSURE: 58 MMHG | RESPIRATION RATE: 16 BRPM | OXYGEN SATURATION: 98 % | HEIGHT: 60 IN | HEART RATE: 62 BPM | BODY MASS INDEX: 21.6 KG/M2

## 2018-12-19 DIAGNOSIS — D63.1 ANEMIA IN CHRONIC KIDNEY DISEASE: ICD-10-CM

## 2018-12-19 DIAGNOSIS — K59.09 OTHER CONSTIPATION: ICD-10-CM

## 2018-12-19 PROCEDURE — 99213 OFFICE O/P EST LOW 20 MIN: CPT

## 2019-01-11 ENCOUNTER — TRANSCRIPTION ENCOUNTER (OUTPATIENT)
Age: 84
End: 2019-01-11

## 2019-01-11 ENCOUNTER — EMERGENCY (EMERGENCY)
Facility: HOSPITAL | Age: 84
LOS: 1 days | Discharge: HOME | End: 2019-01-11
Attending: EMERGENCY MEDICINE
Payer: MEDICARE

## 2019-01-11 VITALS
RESPIRATION RATE: 20 BRPM | HEART RATE: 78 BPM | WEIGHT: 108.91 LBS | TEMPERATURE: 98 F | SYSTOLIC BLOOD PRESSURE: 222 MMHG | DIASTOLIC BLOOD PRESSURE: 70 MMHG | HEIGHT: 60 IN | OXYGEN SATURATION: 98 %

## 2019-01-11 VITALS — SYSTOLIC BLOOD PRESSURE: 187 MMHG | DIASTOLIC BLOOD PRESSURE: 62 MMHG

## 2019-01-11 DIAGNOSIS — Z95.3 PRESENCE OF XENOGENIC HEART VALVE: Chronic | ICD-10-CM

## 2019-01-11 DIAGNOSIS — Z90.710 ACQUIRED ABSENCE OF BOTH CERVIX AND UTERUS: Chronic | ICD-10-CM

## 2019-01-11 DIAGNOSIS — Z90.49 ACQUIRED ABSENCE OF OTHER SPECIFIED PARTS OF DIGESTIVE TRACT: Chronic | ICD-10-CM

## 2019-01-11 PROCEDURE — 12011 RPR F/E/E/N/L/M 2.5 CM/<: CPT

## 2019-01-11 PROCEDURE — 99284 EMERGENCY DEPT VISIT MOD MDM: CPT | Mod: 25

## 2019-01-11 PROCEDURE — 90471 IMMUNIZATION ADMIN: CPT

## 2019-01-11 PROCEDURE — 90715 TDAP VACCINE 7 YRS/> IM: CPT

## 2019-01-11 PROCEDURE — 70450 CT HEAD/BRAIN W/O DYE: CPT | Mod: 26

## 2019-01-11 PROCEDURE — 70450 CT HEAD/BRAIN W/O DYE: CPT

## 2019-01-11 RX ORDER — TETANUS TOXOID, REDUCED DIPHTHERIA TOXOID AND ACELLULAR PERTUSSIS VACCINE, ADSORBED 5; 2.5; 8; 8; 2.5 [IU]/.5ML; [IU]/.5ML; UG/.5ML; UG/.5ML; UG/.5ML
0.5 SUSPENSION INTRAMUSCULAR ONCE
Qty: 0 | Refills: 0 | Status: COMPLETED | OUTPATIENT
Start: 2019-01-11 | End: 2019-01-11

## 2019-01-11 RX ORDER — HYDRALAZINE HCL 50 MG
25 TABLET ORAL ONCE
Qty: 0 | Refills: 0 | Status: COMPLETED | OUTPATIENT
Start: 2019-01-11 | End: 2019-01-11

## 2019-01-11 RX ORDER — ATENOLOL 25 MG/1
25 TABLET ORAL ONCE
Qty: 0 | Refills: 0 | Status: COMPLETED | OUTPATIENT
Start: 2019-01-11 | End: 2019-01-11

## 2019-01-11 RX ADMIN — ATENOLOL 25 MILLIGRAM(S): 25 TABLET ORAL at 12:01

## 2019-01-11 RX ADMIN — Medication 25 MILLIGRAM(S): at 12:01

## 2019-01-11 RX ADMIN — TETANUS TOXOID, REDUCED DIPHTHERIA TOXOID AND ACELLULAR PERTUSSIS VACCINE, ADSORBED 0.5 MILLILITER(S): 5; 2.5; 8; 8; 2.5 SUSPENSION INTRAMUSCULAR at 12:00

## 2019-01-11 NOTE — ED ADULT TRIAGE NOTE - CHIEF COMPLAINT QUOTE
c/o fell, tripped and head her head, lac to forehead c/o fell, tripped and head her head, lac to forehead.  denies loc ,

## 2019-01-11 NOTE — ED STATDOCS - CARE PLAN
Principal Discharge DX:	Fall Principal Discharge DX:	Forehead laceration, initial encounter  Secondary Diagnosis:	Minor head injury without loss of consciousness, initial encounter

## 2019-01-11 NOTE — ED STATDOCS - MEDICAL DECISION MAKING DETAILS
Will check head CT and reevaluate. Neurologically intact. Will check head CT reevaluate and lac repair. Neurologically intact. Will check head CT reevaluate and lac repair. Will give BP meds.

## 2019-01-11 NOTE — ED STATDOCS - NEUROLOGICAL, MLM
Neurologically intact, Cranial nerves 2-12 intact, normal sensation and strength in UE and LE b/l. Neurologically intact, Cranial nerves 2-12 intact, no pronator drift, normal finger to nose, normal heel to shin, 5/5 strength UE and LE b/l. Neurologically intact, Cranial nerves 2-12 intact, no pronator drift, normal finger to nose,, 5/5 strength UE and LE b/l., normal gait (within limits of requiring a cane, but otherwise independent and steady)

## 2019-01-11 NOTE — ED STATDOCS - PROGRESS NOTE DETAILS
PA note: 5 6-0 prolene sutures placed above r eye over 2cm laceration. Pt tolerated procedure well. CT scan w/o acute findings. Pt educated on wound care, signs of infection, and to return to ED in 5 days for suture removal.

## 2019-01-11 NOTE — ED STATDOCS - ATTENDING CONTRIBUTION TO CARE
I, Fred Spicer, performed the initial face to face bedside interview with this patient regarding history of present illness, review of symptoms and relevant past medical, social and family history.  I completed an independent physical examination.  I was the initial provider who evaluated this patient. I have signed out the follow up of any pending tests (i.e. labs, radiological studies) to the ACP.  I have communicated the patient’s plan of care and disposition with the ACP.

## 2019-01-19 ENCOUNTER — TRANSCRIPTION ENCOUNTER (OUTPATIENT)
Age: 84
End: 2019-01-19

## 2019-01-28 ENCOUNTER — NON-APPOINTMENT (OUTPATIENT)
Age: 84
End: 2019-01-28

## 2019-01-28 ENCOUNTER — APPOINTMENT (OUTPATIENT)
Dept: CARDIOLOGY | Facility: CLINIC | Age: 84
End: 2019-01-28
Payer: MEDICARE

## 2019-01-28 VITALS
DIASTOLIC BLOOD PRESSURE: 56 MMHG | HEART RATE: 63 BPM | WEIGHT: 112 LBS | SYSTOLIC BLOOD PRESSURE: 192 MMHG | BODY MASS INDEX: 21.87 KG/M2 | OXYGEN SATURATION: 98 %

## 2019-01-28 VITALS — SYSTOLIC BLOOD PRESSURE: 196 MMHG | DIASTOLIC BLOOD PRESSURE: 54 MMHG

## 2019-01-28 DIAGNOSIS — N18.4 CHRONIC KIDNEY DISEASE, STAGE 4 (SEVERE): ICD-10-CM

## 2019-01-28 DIAGNOSIS — I10 ESSENTIAL (PRIMARY) HYPERTENSION: ICD-10-CM

## 2019-01-28 DIAGNOSIS — I35.0 NONRHEUMATIC AORTIC (VALVE) STENOSIS: ICD-10-CM

## 2019-01-28 PROCEDURE — 93000 ELECTROCARDIOGRAM COMPLETE: CPT

## 2019-01-28 PROCEDURE — 99214 OFFICE O/P EST MOD 30 MIN: CPT

## 2019-01-28 RX ORDER — FERROUS SULFATE 325(65) MG
325 (65 FE) TABLET ORAL DAILY
Refills: 0 | Status: ACTIVE | COMMUNITY
Start: 2017-05-12

## 2019-01-28 RX ORDER — HYDRALAZINE HYDROCHLORIDE 25 MG/1
25 TABLET ORAL TWICE DAILY
Qty: 180 | Refills: 1 | Status: ACTIVE | COMMUNITY
Start: 2017-07-13

## 2019-01-28 RX ORDER — MULTIVITAMIN
TABLET ORAL DAILY
Refills: 0 | Status: ACTIVE | COMMUNITY

## 2019-01-28 RX ORDER — COLD-HOT PACK
125 MCG EACH MISCELLANEOUS
Qty: 90 | Refills: 0 | Status: ACTIVE | COMMUNITY

## 2019-01-28 RX ORDER — TIMOLOL MALEATE 5 MG/ML
0.5 SOLUTION OPHTHALMIC
Qty: 5 | Refills: 0 | Status: DISCONTINUED | COMMUNITY
Start: 2017-04-20 | End: 2019-01-28

## 2019-01-28 RX ORDER — MV-MIN/FOLIC/VIT K/LYCOP/COQ10 200-100MCG
75 CAPSULE ORAL
Refills: 0 | Status: ACTIVE | COMMUNITY
Start: 2017-06-07

## 2019-01-28 RX ORDER — TIMOLOL MALEATE 5 MG/ML
0.5 SOLUTION OPHTHALMIC
Refills: 0 | Status: ACTIVE | COMMUNITY

## 2019-01-28 RX ORDER — CALCIUM CARBONATE 500 MG/1
500 TABLET, CHEWABLE ORAL
Refills: 0 | Status: ACTIVE | COMMUNITY
Start: 2017-06-07

## 2019-01-28 RX ORDER — VITAMIN B COMPLEX
TABLET ORAL DAILY
Refills: 0 | Status: ACTIVE | COMMUNITY
Start: 2017-06-07

## 2019-01-28 RX ORDER — ALPRAZOLAM 0.25 MG/1
0.25 TABLET ORAL
Qty: 60 | Refills: 0 | Status: ACTIVE | COMMUNITY

## 2019-01-28 RX ORDER — TURMERIC 95 %
POWDER (GRAM) MISCELLANEOUS
Refills: 0 | Status: DISCONTINUED | COMMUNITY
Start: 2017-06-07 | End: 2019-01-28

## 2019-01-28 RX ORDER — POTASSIUM BICARB/MAG COMBO 21 500-300 MG
POWDER EFFERVESCENT (GRAM) ORAL
Refills: 0 | Status: ACTIVE | COMMUNITY
Start: 2017-06-07

## 2019-01-28 RX ORDER — ACETAMINOPHEN 500 MG
TABLET ORAL
Refills: 0 | Status: ACTIVE | COMMUNITY

## 2019-01-28 RX ORDER — SERTRALINE HYDROCHLORIDE 25 MG/1
25 TABLET, FILM COATED ORAL DAILY
Refills: 0 | Status: ACTIVE | COMMUNITY

## 2019-01-28 RX ORDER — HYDROCORTISONE ACETATE 25 MG/1
25 SUPPOSITORY RECTAL TWICE DAILY
Qty: 14 | Refills: 3 | Status: ACTIVE | COMMUNITY
Start: 2018-01-05

## 2019-01-28 RX ORDER — OXYBUTYNIN CHLORIDE 2.5 MG/1
TABLET ORAL
Refills: 0 | Status: ACTIVE | COMMUNITY

## 2019-01-28 NOTE — REVIEW OF SYSTEMS
[Headache] : no headache [Recent Weight Gain (___ Lbs)] : no recent weight gain [Feeling Fatigued] : not feeling fatigued [Recent Weight Loss (___ Lbs)] : no recent weight loss [Blurry Vision] : no blurred vision [Seeing Double (Diplopia)] : no diplopia [Eyeglasses] : not currently wearing eyeglasses [Earache] : no earache [Loss Of Hearing] : no hearing loss [Mouth Sores] : no mouth sores [Sinus Pressure] : no sinus pressure [Dyspnea on exertion] : dyspnea during exertion [Chest  Pressure] : no chest pressure [Chest Pain] : no chest pain [Lower Ext Edema] : no extremity edema [Leg Claudication] : no intermittent leg claudication [Palpitations] : no palpitations [Cough] : no cough [Wheezing] : no wheezing [Abdominal Pain] : no abdominal pain [Nausea] : no nausea [Vomiting] : no vomiting [Heartburn] : no heartburn [Change in Appetite] : no change in appetite [Dysuria] : no dysuria [Incontinence] : no incontinence [Pelvic Pain] : no pelvic pain [Joint Pain] : joint pain [Joint Swelling] : no joint swelling [Muscle Cramps] : no muscle cramps [Skin: A Rash] : no rash: [Itching] : no itching [Change In Color Of Skin] : change in skin color [Skin Lesions] : skin lesion(s): [Dizziness] : no dizziness [Numbness (Hypesthesia)] : no numbness [Convulsions] : no convulsions [Confusion] : no confusion was observed [Anxiety] : no anxiety [Excessive Thirst] : no polydipsia [Easy Bleeding] : no tendency for easy bleeding [Easy Bruising] : no tendency for easy bruising

## 2019-01-28 NOTE — PHYSICAL EXAM
[Normal Appearance] : normal appearance [Well Groomed] : well groomed [Normal Oral Mucosa] : normal oral mucosa [Normal Oropharynx] : normal oropharynx [Normal Jugular Venous A Waves Present] : normal jugular venous A waves present [Normal Jugular Venous V Waves Present] : normal jugular venous V waves present [Respiration, Rhythm And Depth] : normal respiratory rhythm and effort [Auscultation Breath Sounds / Voice Sounds] : lungs were clear to auscultation bilaterally [Heart Rate And Rhythm] : heart rate and rhythm were normal [Heart Sounds] : normal S1 and S2 [Arterial Pulses Normal] : the arterial pulses were normal [Edema] : no peripheral edema present [Bowel Sounds] : normal bowel sounds [Abdomen Soft] : soft [] : no hepato-splenomegaly [Abnormal Walk] : normal gait [Nail Clubbing] : no clubbing of the fingernails [Cyanosis, Localized] : no localized cyanosis [FreeTextEntry1] : multiple cuts and ecchymosis on b/l lower and upper ext. knee wound with scab [Oriented To Time, Place, And Person] : oriented to person, place, and time [Impaired Insight] : insight and judgment were intact

## 2019-01-28 NOTE — ASSESSMENT
[FreeTextEntry1] : 1. BP elevated today, increase hydralazine. \par 2. Cont with asa\par 3. advised pt to use a walker\par 4. repeat TTE s/p TAVR\par 5. Labs from pmd requested.\par 6. CKD, followed by Dr. Townsend\par FU in 6 months

## 2019-01-28 NOTE — HISTORY OF PRESENT ILLNESS
[FreeTextEntry1] : Pt is here s/p  TAVR, afib.\par She is 89 years old. CKD stage 4. \par she has fallen earlier this month on vaccum  cord. CT head negative for bleed. Again fell on fron step and has a healing wound on her knee. No cp. \par \par From prior notes. \par She feels wel, except for being black and blue everywhere, no cp or sob, lives alone walks with a cane. No recent fall.\par Here with her daughter. most recent labs from May 2018, Plts were normal. no other bleeding issues. \par \par From prior notes:89 yo female who is here s/p TAVR by Dr Anne March 24, 2017. Hx of CKD, HTN and moderate carotid stenosis. Her BP at home is normal, she checks it twice a day, her machine is calibrated. She lives alone. She denies having any chest pain but gets short of breath on exertion. Her breathing is now improved markedly after TAVR. \par \par Echo March 2017: Normal LVEF, grade II DD, moderate LVH, moderate TR, mild MR with severe MAC. PASP of 55 mmHg. \par \par [facility] 3/13/17: LM 20-30%, mid LAD 50%, D1 40%, LCX 20%, mid RCA 30%, LVEF 55%. \par \par 1/5/2018\par Pt is here for routine f/u. Reports of feeling well. Denies chest pain, shortness of breath, palpitations, syncope or near syncope, orthopnea and PND. Compliant with medications. No activity intolerance, and does chores alll by herself, and she lives by herslef too, She resides in two [facility] [place] [facility]", where she frequents both levels. She gets tired toward the end of the day. No leg edema.\par Was in [facility] last 12/29/2017 for rectal bleeding, stayed for few hours, seen by GI -Dr. García, was told source could have been internal hemorrhoids, no colonospocy done because of her age as well presentation does not warrant for the procedure. Has some bruising on upper and lowert extremities. Uses cane for balance. \par \par \par 3/1/2018\par Patient presents today for follow up for PAF. She was seen in [facility] ED on 2/24/2018 for c/o palpitations lasting approximately 1 hour. Once in ED ED, EKG showed SR, she was seen by Dr. Rich and atenolol was increased to BID. Her last a-fib episode was in 2016. She is on Plavix s/p TAVR, no ASA. She bruises easily, has history of falls, and lives alone. She denies any further palpitations, CP, SOB, CLIFTON, fatigue, and presyncope/syncope. She states she is tolerating the increased atenolol and states she feels well.

## 2019-02-06 RX ORDER — ASPIRIN ENTERIC COATED TABLETS 81 MG 81 MG/1
81 TABLET, DELAYED RELEASE ORAL
Qty: 90 | Refills: 3 | Status: ACTIVE | COMMUNITY
Start: 2018-07-02 | End: 1900-01-01

## 2019-02-09 NOTE — ED PROVIDER NOTE - NEUROLOGICAL, MLM
IV attemped x3 to no avail related to poor venous access.    Alert and oriented, no focal deficits, no motor or sensory deficits.

## 2019-02-25 ENCOUNTER — MEDICATION RENEWAL (OUTPATIENT)
Age: 84
End: 2019-02-25

## 2019-03-27 NOTE — ED PROVIDER NOTE - GASTROINTESTINAL, MLM
Detail Level: Generalized
Detail Level: Zone
Detail Level: Detailed
LLQ TTP. No rebound. No guarding.

## 2019-04-11 NOTE — ED ADULT NURSE NOTE - PAIN: DESCRIPTION (FREQUENCY/QUALITY)
4/12/19 Patient: Odalis Quijano YOB: 1979 Date of Visit: 4/11/2019 Aylin Steven MD 
[address] [place] [id number] VIA Facsimile: [phone] Dear Aylin Steven MD, Thank you for referring Mr. Odalis Quijano to [address] [place] [address] for evaluation. My notes for this consultation are attached. If you have questions, please do not hesitate to call me. I look forward to following your patient along with you. Sincerely, Daniel Keith MD 
 

73
pressure/deep

## 2019-05-06 ENCOUNTER — APPOINTMENT (OUTPATIENT)
Dept: CARDIOLOGY | Facility: CLINIC | Age: 84
End: 2019-05-06

## 2020-01-01 ENCOUNTER — TRANSCRIPTION ENCOUNTER (OUTPATIENT)
Age: 85
End: 2020-01-01

## 2020-01-01 ENCOUNTER — RX RENEWAL (OUTPATIENT)
Age: 85
End: 2020-01-01

## 2020-01-01 ENCOUNTER — INPATIENT (INPATIENT)
Facility: HOSPITAL | Age: 85
LOS: 5 days | Discharge: HOME | DRG: 470 | End: 2020-03-10
Attending: HOSPITALIST | Admitting: HOSPITALIST
Payer: MEDICARE

## 2020-01-01 VITALS
OXYGEN SATURATION: 87 % | RESPIRATION RATE: 18 BRPM | SYSTOLIC BLOOD PRESSURE: 152 MMHG | DIASTOLIC BLOOD PRESSURE: 52 MMHG | TEMPERATURE: 98 F | HEART RATE: 75 BPM

## 2020-01-01 VITALS
HEART RATE: 68 BPM | DIASTOLIC BLOOD PRESSURE: 73 MMHG | SYSTOLIC BLOOD PRESSURE: 237 MMHG | OXYGEN SATURATION: 92 % | TEMPERATURE: 98 F | WEIGHT: 115.08 LBS | RESPIRATION RATE: 20 BRPM | HEIGHT: 62 IN

## 2020-01-01 DIAGNOSIS — Z90.49 ACQUIRED ABSENCE OF OTHER SPECIFIED PARTS OF DIGESTIVE TRACT: Chronic | ICD-10-CM

## 2020-01-01 DIAGNOSIS — S72.009A FRACTURE OF UNSPECIFIED PART OF NECK OF UNSPECIFIED FEMUR, INITIAL ENCOUNTER FOR CLOSED FRACTURE: ICD-10-CM

## 2020-01-01 DIAGNOSIS — Z95.3 PRESENCE OF XENOGENIC HEART VALVE: Chronic | ICD-10-CM

## 2020-01-01 DIAGNOSIS — Z90.710 ACQUIRED ABSENCE OF BOTH CERVIX AND UTERUS: Chronic | ICD-10-CM

## 2020-01-01 LAB
24R-OH-CALCIDIOL SERPL-MCNC: 36.1 NG/ML — SIGNIFICANT CHANGE UP (ref 30–80)
ALBUMIN SERPL ELPH-MCNC: 4 G/DL — SIGNIFICANT CHANGE UP (ref 3.3–5.2)
ALP SERPL-CCNC: 72 U/L — SIGNIFICANT CHANGE UP (ref 40–120)
ALT FLD-CCNC: 15 U/L — SIGNIFICANT CHANGE UP
ANION GAP SERPL CALC-SCNC: 15 MMOL/L — SIGNIFICANT CHANGE UP (ref 5–17)
ANION GAP SERPL CALC-SCNC: 15 MMOL/L — SIGNIFICANT CHANGE UP (ref 5–17)
ANION GAP SERPL CALC-SCNC: 16 MMOL/L — SIGNIFICANT CHANGE UP (ref 5–17)
ANION GAP SERPL CALC-SCNC: 17 MMOL/L — SIGNIFICANT CHANGE UP (ref 5–17)
APTT BLD: 29.1 SEC — SIGNIFICANT CHANGE UP (ref 27.5–36.3)
AST SERPL-CCNC: 22 U/L — SIGNIFICANT CHANGE UP
BASOPHILS # BLD AUTO: 0.03 K/UL — SIGNIFICANT CHANGE UP (ref 0–0.2)
BASOPHILS # BLD AUTO: 0.03 K/UL — SIGNIFICANT CHANGE UP (ref 0–0.2)
BASOPHILS # BLD AUTO: 0.04 K/UL — SIGNIFICANT CHANGE UP (ref 0–0.2)
BASOPHILS NFR BLD AUTO: 0.3 % — SIGNIFICANT CHANGE UP (ref 0–2)
BASOPHILS NFR BLD AUTO: 0.4 % — SIGNIFICANT CHANGE UP (ref 0–2)
BASOPHILS NFR BLD AUTO: 0.5 % — SIGNIFICANT CHANGE UP (ref 0–2)
BILIRUB SERPL-MCNC: 0.2 MG/DL — LOW (ref 0.4–2)
BLD GP AB SCN SERPL QL: SIGNIFICANT CHANGE UP
BUN SERPL-MCNC: 64 MG/DL — HIGH (ref 8–20)
BUN SERPL-MCNC: 68 MG/DL — HIGH (ref 8–20)
BUN SERPL-MCNC: 68 MG/DL — HIGH (ref 8–20)
BUN SERPL-MCNC: 72 MG/DL — HIGH (ref 8–20)
BUN SERPL-MCNC: 72 MG/DL — HIGH (ref 8–20)
BUN SERPL-MCNC: 74 MG/DL — HIGH (ref 8–20)
CALCIUM SERPL-MCNC: 7.7 MG/DL — LOW (ref 8.6–10.2)
CALCIUM SERPL-MCNC: 7.8 MG/DL — LOW (ref 8.6–10.2)
CALCIUM SERPL-MCNC: 7.9 MG/DL — LOW (ref 8.6–10.2)
CALCIUM SERPL-MCNC: 8 MG/DL — LOW (ref 8.6–10.2)
CALCIUM SERPL-MCNC: 8.4 MG/DL — LOW (ref 8.6–10.2)
CALCIUM SERPL-MCNC: 9.1 MG/DL — SIGNIFICANT CHANGE UP (ref 8.6–10.2)
CHLORIDE SERPL-SCNC: 100 MMOL/L — SIGNIFICANT CHANGE UP (ref 98–107)
CHLORIDE SERPL-SCNC: 100 MMOL/L — SIGNIFICANT CHANGE UP (ref 98–107)
CHLORIDE SERPL-SCNC: 101 MMOL/L — SIGNIFICANT CHANGE UP (ref 98–107)
CHLORIDE SERPL-SCNC: 103 MMOL/L — SIGNIFICANT CHANGE UP (ref 98–107)
CHLORIDE SERPL-SCNC: 98 MMOL/L — SIGNIFICANT CHANGE UP (ref 98–107)
CHLORIDE SERPL-SCNC: 99 MMOL/L — SIGNIFICANT CHANGE UP (ref 98–107)
CO2 SERPL-SCNC: 17 MMOL/L — LOW (ref 22–29)
CO2 SERPL-SCNC: 18 MMOL/L — LOW (ref 22–29)
CO2 SERPL-SCNC: 19 MMOL/L — LOW (ref 22–29)
CO2 SERPL-SCNC: 19 MMOL/L — LOW (ref 22–29)
CO2 SERPL-SCNC: 20 MMOL/L — LOW (ref 22–29)
CO2 SERPL-SCNC: 21 MMOL/L — LOW (ref 22–29)
CREAT SERPL-MCNC: 1.72 MG/DL — HIGH (ref 0.5–1.3)
CREAT SERPL-MCNC: 1.74 MG/DL — HIGH (ref 0.5–1.3)
CREAT SERPL-MCNC: 1.99 MG/DL — HIGH (ref 0.5–1.3)
CREAT SERPL-MCNC: 2.12 MG/DL — HIGH (ref 0.5–1.3)
CREAT SERPL-MCNC: 2.27 MG/DL — HIGH (ref 0.5–1.3)
CREAT SERPL-MCNC: 2.32 MG/DL — HIGH (ref 0.5–1.3)
EOSINOPHIL # BLD AUTO: 0.36 K/UL — SIGNIFICANT CHANGE UP (ref 0–0.5)
EOSINOPHIL # BLD AUTO: 0.46 K/UL — SIGNIFICANT CHANGE UP (ref 0–0.5)
EOSINOPHIL # BLD AUTO: 0.78 K/UL — HIGH (ref 0–0.5)
EOSINOPHIL NFR BLD AUTO: 11.1 % — HIGH (ref 0–6)
EOSINOPHIL NFR BLD AUTO: 2.7 % — SIGNIFICANT CHANGE UP (ref 0–6)
EOSINOPHIL NFR BLD AUTO: 7 % — HIGH (ref 0–6)
FERRITIN SERPL-MCNC: 178 NG/ML — HIGH (ref 15–150)
GLUCOSE BLDC GLUCOMTR-MCNC: 97 MG/DL — SIGNIFICANT CHANGE UP (ref 70–99)
GLUCOSE SERPL-MCNC: 120 MG/DL — HIGH (ref 70–99)
GLUCOSE SERPL-MCNC: 76 MG/DL — SIGNIFICANT CHANGE UP (ref 70–99)
GLUCOSE SERPL-MCNC: 90 MG/DL — SIGNIFICANT CHANGE UP (ref 70–99)
GLUCOSE SERPL-MCNC: 94 MG/DL — SIGNIFICANT CHANGE UP (ref 70–99)
GLUCOSE SERPL-MCNC: 95 MG/DL — SIGNIFICANT CHANGE UP (ref 70–99)
GLUCOSE SERPL-MCNC: 95 MG/DL — SIGNIFICANT CHANGE UP (ref 70–99)
HCT VFR BLD CALC: 21.8 % — LOW (ref 34.5–45)
HCT VFR BLD CALC: 22.3 % — LOW (ref 34.5–45)
HCT VFR BLD CALC: 23.9 % — LOW (ref 34.5–45)
HCT VFR BLD CALC: 24.5 % — LOW (ref 34.5–45)
HCT VFR BLD CALC: 24.7 % — LOW (ref 34.5–45)
HCT VFR BLD CALC: 25.1 % — LOW (ref 34.5–45)
HCT VFR BLD CALC: 26.6 % — LOW (ref 34.5–45)
HCT VFR BLD CALC: 27.4 % — LOW (ref 34.5–45)
HCT VFR BLD CALC: 33.3 % — LOW (ref 34.5–45)
HGB BLD-MCNC: 11 G/DL — LOW (ref 11.5–15.5)
HGB BLD-MCNC: 7.2 G/DL — LOW (ref 11.5–15.5)
HGB BLD-MCNC: 7.3 G/DL — LOW (ref 11.5–15.5)
HGB BLD-MCNC: 7.8 G/DL — LOW (ref 11.5–15.5)
HGB BLD-MCNC: 8.2 G/DL — LOW (ref 11.5–15.5)
HGB BLD-MCNC: 8.2 G/DL — LOW (ref 11.5–15.5)
HGB BLD-MCNC: 8.3 G/DL — LOW (ref 11.5–15.5)
HGB BLD-MCNC: 8.5 G/DL — LOW (ref 11.5–15.5)
HGB BLD-MCNC: 8.8 G/DL — LOW (ref 11.5–15.5)
IMM GRANULOCYTES NFR BLD AUTO: 0.5 % — SIGNIFICANT CHANGE UP (ref 0–1.5)
IMM GRANULOCYTES NFR BLD AUTO: 0.5 % — SIGNIFICANT CHANGE UP (ref 0–1.5)
IMM GRANULOCYTES NFR BLD AUTO: 0.6 % — SIGNIFICANT CHANGE UP (ref 0–1.5)
INR BLD: 0.98 RATIO — SIGNIFICANT CHANGE UP (ref 0.88–1.16)
IRON SATN MFR SERPL: 19 % — SIGNIFICANT CHANGE UP (ref 14–50)
IRON SATN MFR SERPL: 30 UG/DL — LOW (ref 37–145)
LYMPHOCYTES # BLD AUTO: 0.85 K/UL — LOW (ref 1–3.3)
LYMPHOCYTES # BLD AUTO: 1.04 K/UL — SIGNIFICANT CHANGE UP (ref 1–3.3)
LYMPHOCYTES # BLD AUTO: 1.42 K/UL — SIGNIFICANT CHANGE UP (ref 1–3.3)
LYMPHOCYTES # BLD AUTO: 10.7 % — LOW (ref 13–44)
LYMPHOCYTES # BLD AUTO: 13 % — SIGNIFICANT CHANGE UP (ref 13–44)
LYMPHOCYTES # BLD AUTO: 14.8 % — SIGNIFICANT CHANGE UP (ref 13–44)
MAGNESIUM SERPL-MCNC: 1.8 MG/DL — SIGNIFICANT CHANGE UP (ref 1.6–2.6)
MAGNESIUM SERPL-MCNC: 1.9 MG/DL — SIGNIFICANT CHANGE UP (ref 1.6–2.6)
MCHC RBC-ENTMCNC: 31.3 PG — SIGNIFICANT CHANGE UP (ref 27–34)
MCHC RBC-ENTMCNC: 31.4 PG — SIGNIFICANT CHANGE UP (ref 27–34)
MCHC RBC-ENTMCNC: 31.4 PG — SIGNIFICANT CHANGE UP (ref 27–34)
MCHC RBC-ENTMCNC: 31.6 PG — SIGNIFICANT CHANGE UP (ref 27–34)
MCHC RBC-ENTMCNC: 31.8 PG — SIGNIFICANT CHANGE UP (ref 27–34)
MCHC RBC-ENTMCNC: 31.9 PG — SIGNIFICANT CHANGE UP (ref 27–34)
MCHC RBC-ENTMCNC: 32 GM/DL — SIGNIFICANT CHANGE UP (ref 32–36)
MCHC RBC-ENTMCNC: 32.1 GM/DL — SIGNIFICANT CHANGE UP (ref 32–36)
MCHC RBC-ENTMCNC: 32.3 GM/DL — SIGNIFICANT CHANGE UP (ref 32–36)
MCHC RBC-ENTMCNC: 33 GM/DL — SIGNIFICANT CHANGE UP (ref 32–36)
MCHC RBC-ENTMCNC: 33.1 GM/DL — SIGNIFICANT CHANGE UP (ref 32–36)
MCHC RBC-ENTMCNC: 33.2 GM/DL — SIGNIFICANT CHANGE UP (ref 32–36)
MCHC RBC-ENTMCNC: 33.5 GM/DL — SIGNIFICANT CHANGE UP (ref 32–36)
MCHC RBC-ENTMCNC: 33.5 GM/DL — SIGNIFICANT CHANGE UP (ref 32–36)
MCV RBC AUTO: 94.7 FL — SIGNIFICANT CHANGE UP (ref 80–100)
MCV RBC AUTO: 95 FL — SIGNIFICANT CHANGE UP (ref 80–100)
MCV RBC AUTO: 95.1 FL — SIGNIFICANT CHANGE UP (ref 80–100)
MCV RBC AUTO: 95.2 FL — SIGNIFICANT CHANGE UP (ref 80–100)
MCV RBC AUTO: 96.1 FL — SIGNIFICANT CHANGE UP (ref 80–100)
MCV RBC AUTO: 97.9 FL — SIGNIFICANT CHANGE UP (ref 80–100)
MCV RBC AUTO: 98.7 FL — SIGNIFICANT CHANGE UP (ref 80–100)
MCV RBC AUTO: 98.9 FL — SIGNIFICANT CHANGE UP (ref 80–100)
MONOCYTES # BLD AUTO: 0.72 K/UL — SIGNIFICANT CHANGE UP (ref 0–0.9)
MONOCYTES # BLD AUTO: 0.77 K/UL — SIGNIFICANT CHANGE UP (ref 0–0.9)
MONOCYTES # BLD AUTO: 0.78 K/UL — SIGNIFICANT CHANGE UP (ref 0–0.9)
MONOCYTES NFR BLD AUTO: 11.1 % — SIGNIFICANT CHANGE UP (ref 2–14)
MONOCYTES NFR BLD AUTO: 11.8 % — SIGNIFICANT CHANGE UP (ref 2–14)
MONOCYTES NFR BLD AUTO: 5.4 % — SIGNIFICANT CHANGE UP (ref 2–14)
NEUTROPHILS # BLD AUTO: 10.67 K/UL — HIGH (ref 1.8–7.4)
NEUTROPHILS # BLD AUTO: 4.34 K/UL — SIGNIFICANT CHANGE UP (ref 1.8–7.4)
NEUTROPHILS # BLD AUTO: 4.39 K/UL — SIGNIFICANT CHANGE UP (ref 1.8–7.4)
NEUTROPHILS NFR BLD AUTO: 62 % — SIGNIFICANT CHANGE UP (ref 43–77)
NEUTROPHILS NFR BLD AUTO: 67.2 % — SIGNIFICANT CHANGE UP (ref 43–77)
NEUTROPHILS NFR BLD AUTO: 80.4 % — HIGH (ref 43–77)
PHOSPHATE SERPL-MCNC: 3.8 MG/DL — SIGNIFICANT CHANGE UP (ref 2.4–4.7)
PHOSPHATE SERPL-MCNC: 5 MG/DL — HIGH (ref 2.4–4.7)
PLATELET # BLD AUTO: 151 K/UL — SIGNIFICANT CHANGE UP (ref 150–400)
PLATELET # BLD AUTO: 154 K/UL — SIGNIFICANT CHANGE UP (ref 150–400)
PLATELET # BLD AUTO: 166 K/UL — SIGNIFICANT CHANGE UP (ref 150–400)
PLATELET # BLD AUTO: 174 K/UL — SIGNIFICANT CHANGE UP (ref 150–400)
PLATELET # BLD AUTO: 191 K/UL — SIGNIFICANT CHANGE UP (ref 150–400)
PLATELET # BLD AUTO: 201 K/UL — SIGNIFICANT CHANGE UP (ref 150–400)
PLATELET # BLD AUTO: 209 K/UL — SIGNIFICANT CHANGE UP (ref 150–400)
PLATELET # BLD AUTO: 257 K/UL — SIGNIFICANT CHANGE UP (ref 150–400)
POTASSIUM SERPL-MCNC: 4.2 MMOL/L — SIGNIFICANT CHANGE UP (ref 3.5–5.3)
POTASSIUM SERPL-MCNC: 4.6 MMOL/L — SIGNIFICANT CHANGE UP (ref 3.5–5.3)
POTASSIUM SERPL-MCNC: 4.6 MMOL/L — SIGNIFICANT CHANGE UP (ref 3.5–5.3)
POTASSIUM SERPL-MCNC: 4.7 MMOL/L — SIGNIFICANT CHANGE UP (ref 3.5–5.3)
POTASSIUM SERPL-MCNC: 5.1 MMOL/L — SIGNIFICANT CHANGE UP (ref 3.5–5.3)
POTASSIUM SERPL-MCNC: 5.2 MMOL/L — SIGNIFICANT CHANGE UP (ref 3.5–5.3)
POTASSIUM SERPL-SCNC: 4.2 MMOL/L — SIGNIFICANT CHANGE UP (ref 3.5–5.3)
POTASSIUM SERPL-SCNC: 4.6 MMOL/L — SIGNIFICANT CHANGE UP (ref 3.5–5.3)
POTASSIUM SERPL-SCNC: 4.6 MMOL/L — SIGNIFICANT CHANGE UP (ref 3.5–5.3)
POTASSIUM SERPL-SCNC: 4.7 MMOL/L — SIGNIFICANT CHANGE UP (ref 3.5–5.3)
POTASSIUM SERPL-SCNC: 5.1 MMOL/L — SIGNIFICANT CHANGE UP (ref 3.5–5.3)
POTASSIUM SERPL-SCNC: 5.2 MMOL/L — SIGNIFICANT CHANGE UP (ref 3.5–5.3)
PROT SERPL-MCNC: 7.2 G/DL — SIGNIFICANT CHANGE UP (ref 6.6–8.7)
PROTHROM AB SERPL-ACNC: 11.1 SEC — SIGNIFICANT CHANGE UP (ref 10–12.9)
RBC # BLD: 2.26 M/UL — LOW (ref 3.8–5.2)
RBC # BLD: 2.29 M/UL — LOW (ref 3.8–5.2)
RBC # BLD: 2.57 M/UL — LOW (ref 3.8–5.2)
RBC # BLD: 2.58 M/UL — LOW (ref 3.8–5.2)
RBC # BLD: 2.65 M/UL — LOW (ref 3.8–5.2)
RBC # BLD: 2.69 M/UL — LOW (ref 3.8–5.2)
RBC # BLD: 2.8 M/UL — LOW (ref 3.8–5.2)
RBC # BLD: 3.5 M/UL — LOW (ref 3.8–5.2)
RBC # FLD: 12.2 % — SIGNIFICANT CHANGE UP (ref 10.3–14.5)
RBC # FLD: 12.3 % — SIGNIFICANT CHANGE UP (ref 10.3–14.5)
RBC # FLD: 12.5 % — SIGNIFICANT CHANGE UP (ref 10.3–14.5)
RBC # FLD: 12.5 % — SIGNIFICANT CHANGE UP (ref 10.3–14.5)
RBC # FLD: 12.6 % — SIGNIFICANT CHANGE UP (ref 10.3–14.5)
RBC # FLD: 12.7 % — SIGNIFICANT CHANGE UP (ref 10.3–14.5)
RBC # FLD: 12.7 % — SIGNIFICANT CHANGE UP (ref 10.3–14.5)
RBC # FLD: 12.9 % — SIGNIFICANT CHANGE UP (ref 10.3–14.5)
SODIUM SERPL-SCNC: 132 MMOL/L — LOW (ref 135–145)
SODIUM SERPL-SCNC: 133 MMOL/L — LOW (ref 135–145)
SODIUM SERPL-SCNC: 133 MMOL/L — LOW (ref 135–145)
SODIUM SERPL-SCNC: 137 MMOL/L — SIGNIFICANT CHANGE UP (ref 135–145)
SODIUM SERPL-SCNC: 137 MMOL/L — SIGNIFICANT CHANGE UP (ref 135–145)
SODIUM SERPL-SCNC: 138 MMOL/L — SIGNIFICANT CHANGE UP (ref 135–145)
TIBC SERPL-MCNC: 162 UG/DL — LOW (ref 220–430)
TRANSFERRIN SERPL-MCNC: 113 MG/DL — LOW (ref 192–382)
WBC # BLD: 10.43 K/UL — SIGNIFICANT CHANGE UP (ref 3.8–10.5)
WBC # BLD: 11.86 K/UL — HIGH (ref 3.8–10.5)
WBC # BLD: 13.28 K/UL — HIGH (ref 3.8–10.5)
WBC # BLD: 6.53 K/UL — SIGNIFICANT CHANGE UP (ref 3.8–10.5)
WBC # BLD: 7.01 K/UL — SIGNIFICANT CHANGE UP (ref 3.8–10.5)
WBC # BLD: 7.41 K/UL — SIGNIFICANT CHANGE UP (ref 3.8–10.5)
WBC # BLD: 7.53 K/UL — SIGNIFICANT CHANGE UP (ref 3.8–10.5)
WBC # BLD: 8.21 K/UL — SIGNIFICANT CHANGE UP (ref 3.8–10.5)
WBC # FLD AUTO: 10.43 K/UL — SIGNIFICANT CHANGE UP (ref 3.8–10.5)
WBC # FLD AUTO: 11.86 K/UL — HIGH (ref 3.8–10.5)
WBC # FLD AUTO: 13.28 K/UL — HIGH (ref 3.8–10.5)
WBC # FLD AUTO: 6.53 K/UL — SIGNIFICANT CHANGE UP (ref 3.8–10.5)
WBC # FLD AUTO: 7.01 K/UL — SIGNIFICANT CHANGE UP (ref 3.8–10.5)
WBC # FLD AUTO: 7.41 K/UL — SIGNIFICANT CHANGE UP (ref 3.8–10.5)
WBC # FLD AUTO: 7.53 K/UL — SIGNIFICANT CHANGE UP (ref 3.8–10.5)
WBC # FLD AUTO: 8.21 K/UL — SIGNIFICANT CHANGE UP (ref 3.8–10.5)

## 2020-01-01 PROCEDURE — 99222 1ST HOSP IP/OBS MODERATE 55: CPT | Mod: 57

## 2020-01-01 PROCEDURE — 99223 1ST HOSP IP/OBS HIGH 75: CPT

## 2020-01-01 PROCEDURE — 92610 EVALUATE SWALLOWING FUNCTION: CPT

## 2020-01-01 PROCEDURE — 99233 SBSQ HOSP IP/OBS HIGH 50: CPT

## 2020-01-01 PROCEDURE — C1776: CPT

## 2020-01-01 PROCEDURE — 83735 ASSAY OF MAGNESIUM: CPT

## 2020-01-01 PROCEDURE — 85027 COMPLETE CBC AUTOMATED: CPT

## 2020-01-01 PROCEDURE — 97116 GAIT TRAINING THERAPY: CPT

## 2020-01-01 PROCEDURE — 70450 CT HEAD/BRAIN W/O DYE: CPT | Mod: 26

## 2020-01-01 PROCEDURE — 86850 RBC ANTIBODY SCREEN: CPT

## 2020-01-01 PROCEDURE — 36430 TRANSFUSION BLD/BLD COMPNT: CPT

## 2020-01-01 PROCEDURE — 99232 SBSQ HOSP IP/OBS MODERATE 35: CPT

## 2020-01-01 PROCEDURE — 27236 TREAT THIGH FRACTURE: CPT | Mod: RT

## 2020-01-01 PROCEDURE — 36415 COLL VENOUS BLD VENIPUNCTURE: CPT

## 2020-01-01 PROCEDURE — P9016: CPT

## 2020-01-01 PROCEDURE — C8929: CPT

## 2020-01-01 PROCEDURE — 82728 ASSAY OF FERRITIN: CPT

## 2020-01-01 PROCEDURE — 71045 X-RAY EXAM CHEST 1 VIEW: CPT | Mod: 26

## 2020-01-01 PROCEDURE — 70450 CT HEAD/BRAIN W/O DYE: CPT

## 2020-01-01 PROCEDURE — 71045 X-RAY EXAM CHEST 1 VIEW: CPT

## 2020-01-01 PROCEDURE — 93005 ELECTROCARDIOGRAM TRACING: CPT

## 2020-01-01 PROCEDURE — 83540 ASSAY OF IRON: CPT

## 2020-01-01 PROCEDURE — 99285 EMERGENCY DEPT VISIT HI MDM: CPT | Mod: 25

## 2020-01-01 PROCEDURE — 97110 THERAPEUTIC EXERCISES: CPT

## 2020-01-01 PROCEDURE — 85014 HEMATOCRIT: CPT

## 2020-01-01 PROCEDURE — 84100 ASSAY OF PHOSPHORUS: CPT

## 2020-01-01 PROCEDURE — 73502 X-RAY EXAM HIP UNI 2-3 VIEWS: CPT

## 2020-01-01 PROCEDURE — 73502 X-RAY EXAM HIP UNI 2-3 VIEWS: CPT | Mod: 26,RT

## 2020-01-01 PROCEDURE — 86901 BLOOD TYPING SEROLOGIC RH(D): CPT

## 2020-01-01 PROCEDURE — 82962 GLUCOSE BLOOD TEST: CPT

## 2020-01-01 PROCEDURE — 85610 PROTHROMBIN TIME: CPT

## 2020-01-01 PROCEDURE — 93010 ELECTROCARDIOGRAM REPORT: CPT

## 2020-01-01 PROCEDURE — 86900 BLOOD TYPING SEROLOGIC ABO: CPT

## 2020-01-01 PROCEDURE — 97530 THERAPEUTIC ACTIVITIES: CPT

## 2020-01-01 PROCEDURE — 96376 TX/PRO/DX INJ SAME DRUG ADON: CPT

## 2020-01-01 PROCEDURE — 80053 COMPREHEN METABOLIC PANEL: CPT

## 2020-01-01 PROCEDURE — 97535 SELF CARE MNGMENT TRAINING: CPT

## 2020-01-01 PROCEDURE — 12345: CPT | Mod: NC

## 2020-01-01 PROCEDURE — 99239 HOSP IP/OBS DSCHRG MGMT >30: CPT

## 2020-01-01 PROCEDURE — 84466 ASSAY OF TRANSFERRIN: CPT

## 2020-01-01 PROCEDURE — 85730 THROMBOPLASTIN TIME PARTIAL: CPT

## 2020-01-01 PROCEDURE — 73501 X-RAY EXAM HIP UNI 1 VIEW: CPT

## 2020-01-01 PROCEDURE — 82306 VITAMIN D 25 HYDROXY: CPT

## 2020-01-01 PROCEDURE — 99285 EMERGENCY DEPT VISIT HI MDM: CPT

## 2020-01-01 PROCEDURE — 96374 THER/PROPH/DIAG INJ IV PUSH: CPT

## 2020-01-01 PROCEDURE — 83550 IRON BINDING TEST: CPT

## 2020-01-01 PROCEDURE — 97163 PT EVAL HIGH COMPLEX 45 MIN: CPT

## 2020-01-01 PROCEDURE — 85018 HEMOGLOBIN: CPT

## 2020-01-01 PROCEDURE — 86923 COMPATIBILITY TEST ELECTRIC: CPT

## 2020-01-01 PROCEDURE — 73501 X-RAY EXAM HIP UNI 1 VIEW: CPT | Mod: 26,RT

## 2020-01-01 PROCEDURE — 80048 BASIC METABOLIC PNL TOTAL CA: CPT

## 2020-01-01 PROCEDURE — 97167 OT EVAL HIGH COMPLEX 60 MIN: CPT

## 2020-01-01 RX ORDER — FUROSEMIDE 40 MG
1 TABLET ORAL
Qty: 0 | Refills: 0 | DISCHARGE

## 2020-01-01 RX ORDER — MAGNESIUM HYDROXIDE 400 MG/1
30 TABLET, CHEWABLE ORAL ONCE
Refills: 0 | Status: DISCONTINUED | OUTPATIENT
Start: 2020-01-01 | End: 2020-01-01

## 2020-01-01 RX ORDER — ATENOLOL 25 MG/1
25 TABLET ORAL
Refills: 0 | Status: DISCONTINUED | OUTPATIENT
Start: 2020-01-01 | End: 2020-01-01

## 2020-01-01 RX ORDER — HYDRALAZINE HCL 50 MG
25 TABLET ORAL EVERY 12 HOURS
Refills: 0 | Status: DISCONTINUED | OUTPATIENT
Start: 2020-01-01 | End: 2020-01-01

## 2020-01-01 RX ORDER — ENOXAPARIN SODIUM 100 MG/ML
30 INJECTION SUBCUTANEOUS ONCE
Refills: 0 | Status: COMPLETED | OUTPATIENT
Start: 2020-01-01 | End: 2020-01-01

## 2020-01-01 RX ORDER — OXYCODONE HYDROCHLORIDE 5 MG/1
5 TABLET ORAL EVERY 8 HOURS
Refills: 0 | Status: DISCONTINUED | OUTPATIENT
Start: 2020-01-01 | End: 2020-01-01

## 2020-01-01 RX ORDER — ENOXAPARIN SODIUM 100 MG/ML
30 INJECTION SUBCUTANEOUS
Qty: 840 | Refills: 0
Start: 2020-01-01 | End: 2020-01-01

## 2020-01-01 RX ORDER — ONDANSETRON 8 MG/1
4 TABLET, FILM COATED ORAL EVERY 6 HOURS
Refills: 0 | Status: DISCONTINUED | OUTPATIENT
Start: 2020-01-01 | End: 2020-01-01

## 2020-01-01 RX ORDER — MORPHINE SULFATE 50 MG/1
2 CAPSULE, EXTENDED RELEASE ORAL ONCE
Refills: 0 | Status: DISCONTINUED | OUTPATIENT
Start: 2020-01-01 | End: 2020-01-01

## 2020-01-01 RX ORDER — HYDRALAZINE HCL 50 MG
25 TABLET ORAL EVERY 8 HOURS
Refills: 0 | Status: DISCONTINUED | OUTPATIENT
Start: 2020-01-01 | End: 2020-01-01

## 2020-01-01 RX ORDER — SODIUM CHLORIDE 9 MG/ML
1000 INJECTION INTRAMUSCULAR; INTRAVENOUS; SUBCUTANEOUS
Refills: 0 | Status: DISCONTINUED | OUTPATIENT
Start: 2020-01-01 | End: 2020-01-01

## 2020-01-01 RX ORDER — OXYCODONE HYDROCHLORIDE 5 MG/1
5 TABLET ORAL EVERY 4 HOURS
Refills: 0 | Status: DISCONTINUED | OUTPATIENT
Start: 2020-01-01 | End: 2020-01-01

## 2020-01-01 RX ORDER — CEFAZOLIN SODIUM 1 G
2000 VIAL (EA) INJECTION ONCE
Refills: 0 | Status: DISCONTINUED | OUTPATIENT
Start: 2020-01-01 | End: 2020-01-01

## 2020-01-01 RX ORDER — TIMOLOL 0.5 %
1 DROPS OPHTHALMIC (EYE)
Qty: 0 | Refills: 0 | DISCHARGE
Start: 2020-01-01

## 2020-01-01 RX ORDER — OXYCODONE HYDROCHLORIDE 5 MG/1
2.5 TABLET ORAL EVERY 4 HOURS
Refills: 0 | Status: DISCONTINUED | OUTPATIENT
Start: 2020-01-01 | End: 2020-01-01

## 2020-01-01 RX ORDER — ATENOLOL 25 MG/1
25 TABLET ORAL
Qty: 180 | Refills: 0 | Status: ACTIVE | COMMUNITY
Start: 2020-01-01 | End: 1900-01-01

## 2020-01-01 RX ORDER — SERTRALINE 25 MG/1
25 TABLET, FILM COATED ORAL DAILY
Refills: 0 | Status: DISCONTINUED | OUTPATIENT
Start: 2020-01-01 | End: 2020-01-01

## 2020-01-01 RX ORDER — MORPHINE SULFATE 50 MG/1
2 CAPSULE, EXTENDED RELEASE ORAL EVERY 6 HOURS
Refills: 0 | Status: DISCONTINUED | OUTPATIENT
Start: 2020-01-01 | End: 2020-01-01

## 2020-01-01 RX ORDER — ENOXAPARIN SODIUM 100 MG/ML
30 INJECTION SUBCUTANEOUS AT BEDTIME
Refills: 0 | Status: DISCONTINUED | OUTPATIENT
Start: 2020-01-01 | End: 2020-01-01

## 2020-01-01 RX ORDER — OXYCODONE HYDROCHLORIDE 5 MG/1
0.5 TABLET ORAL
Qty: 30 | Refills: 0
Start: 2020-01-01

## 2020-01-01 RX ORDER — SODIUM CHLORIDE 9 MG/ML
1000 INJECTION, SOLUTION INTRAVENOUS
Refills: 0 | Status: DISCONTINUED | OUTPATIENT
Start: 2020-01-01 | End: 2020-01-01

## 2020-01-01 RX ORDER — CEFAZOLIN SODIUM 1 G
2000 VIAL (EA) INJECTION
Refills: 0 | Status: COMPLETED | OUTPATIENT
Start: 2020-01-01 | End: 2020-01-01

## 2020-01-01 RX ORDER — ATENOLOL 25 MG/1
25 TABLET ORAL EVERY 12 HOURS
Refills: 0 | Status: DISCONTINUED | OUTPATIENT
Start: 2020-01-01 | End: 2020-01-01

## 2020-01-01 RX ORDER — ALPRAZOLAM 0.25 MG
0.25 TABLET ORAL ONCE
Refills: 0 | Status: DISCONTINUED | OUTPATIENT
Start: 2020-01-01 | End: 2020-01-01

## 2020-01-01 RX ORDER — HYDRALAZINE HCL 50 MG
0 TABLET ORAL
Qty: 0 | Refills: 0 | DISCHARGE

## 2020-01-01 RX ORDER — SODIUM CHLORIDE 9 MG/ML
3 INJECTION INTRAMUSCULAR; INTRAVENOUS; SUBCUTANEOUS ONCE
Refills: 0 | Status: COMPLETED | OUTPATIENT
Start: 2020-01-01 | End: 2020-01-01

## 2020-01-01 RX ORDER — SERTRALINE 25 MG/1
1 TABLET, FILM COATED ORAL
Qty: 0 | Refills: 0 | DISCHARGE

## 2020-01-01 RX ORDER — HYDRALAZINE HCL 50 MG
1 TABLET ORAL
Qty: 90 | Refills: 0
Start: 2020-01-01 | End: 2020-01-01

## 2020-01-01 RX ORDER — ENOXAPARIN SODIUM 100 MG/ML
30 INJECTION SUBCUTANEOUS EVERY 24 HOURS
Refills: 0 | Status: DISCONTINUED | OUTPATIENT
Start: 2020-01-01 | End: 2020-01-01

## 2020-01-01 RX ORDER — SERTRALINE 25 MG/1
1.5 TABLET, FILM COATED ORAL
Qty: 0 | Refills: 0 | DISCHARGE

## 2020-01-01 RX ORDER — MAGNESIUM HYDROXIDE 400 MG/1
30 TABLET, CHEWABLE ORAL DAILY
Refills: 0 | Status: DISCONTINUED | OUTPATIENT
Start: 2020-01-01 | End: 2020-01-01

## 2020-01-01 RX ORDER — FENTANYL CITRATE 50 UG/ML
25 INJECTION INTRAVENOUS
Refills: 0 | Status: DISCONTINUED | OUTPATIENT
Start: 2020-01-01 | End: 2020-01-01

## 2020-01-01 RX ORDER — ATENOLOL 25 MG/1
25 TABLET ORAL DAILY
Refills: 0 | Status: DISCONTINUED | OUTPATIENT
Start: 2020-01-01 | End: 2020-01-01

## 2020-01-01 RX ORDER — ASPIRIN/CALCIUM CARB/MAGNESIUM 324 MG
81 TABLET ORAL DAILY
Refills: 0 | Status: DISCONTINUED | OUTPATIENT
Start: 2020-01-01 | End: 2020-01-01

## 2020-01-01 RX ORDER — TIMOLOL 0.5 %
1 DROPS OPHTHALMIC (EYE) DAILY
Refills: 0 | Status: DISCONTINUED | OUTPATIENT
Start: 2020-01-01 | End: 2020-01-01

## 2020-01-01 RX ORDER — ACETAMINOPHEN 500 MG
650 TABLET ORAL EVERY 6 HOURS
Refills: 0 | Status: DISCONTINUED | OUTPATIENT
Start: 2020-01-01 | End: 2020-01-01

## 2020-01-01 RX ORDER — POLYETHYLENE GLYCOL 3350 17 G/17G
17 POWDER, FOR SOLUTION ORAL
Qty: 0 | Refills: 0 | DISCHARGE
Start: 2020-01-01

## 2020-01-01 RX ORDER — SENNA PLUS 8.6 MG/1
2 TABLET ORAL
Qty: 0 | Refills: 0 | DISCHARGE
Start: 2020-01-01

## 2020-01-01 RX ORDER — POLYETHYLENE GLYCOL 3350 17 G/17G
17 POWDER, FOR SOLUTION ORAL DAILY
Refills: 0 | Status: DISCONTINUED | OUTPATIENT
Start: 2020-01-01 | End: 2020-01-01

## 2020-01-01 RX ORDER — ALPRAZOLAM 0.25 MG
0 TABLET ORAL
Qty: 0 | Refills: 0 | DISCHARGE

## 2020-01-01 RX ORDER — POLYETHYLENE GLYCOL 3350 17 G/17G
17 POWDER, FOR SOLUTION ORAL ONCE
Refills: 0 | Status: COMPLETED | OUTPATIENT
Start: 2020-01-01 | End: 2020-01-01

## 2020-01-01 RX ORDER — SERTRALINE 25 MG/1
37.5 TABLET, FILM COATED ORAL DAILY
Refills: 0 | Status: DISCONTINUED | OUTPATIENT
Start: 2020-01-01 | End: 2020-01-01

## 2020-01-01 RX ORDER — ASPIRIN/CALCIUM CARB/MAGNESIUM 324 MG
1 TABLET ORAL
Qty: 0 | Refills: 0 | DISCHARGE
Start: 2020-01-01

## 2020-01-01 RX ORDER — ONDANSETRON 8 MG/1
4 TABLET, FILM COATED ORAL ONCE
Refills: 0 | Status: DISCONTINUED | OUTPATIENT
Start: 2020-01-01 | End: 2020-01-01

## 2020-01-01 RX ORDER — HYDRALAZINE HCL 50 MG
10 TABLET ORAL EVERY 6 HOURS
Refills: 0 | Status: DISCONTINUED | OUTPATIENT
Start: 2020-01-01 | End: 2020-01-01

## 2020-01-01 RX ORDER — ONDANSETRON 8 MG/1
4 TABLET, FILM COATED ORAL ONCE
Refills: 0 | Status: COMPLETED | OUTPATIENT
Start: 2020-01-01 | End: 2020-01-01

## 2020-01-01 RX ORDER — SENNA PLUS 8.6 MG/1
2 TABLET ORAL AT BEDTIME
Refills: 0 | Status: DISCONTINUED | OUTPATIENT
Start: 2020-01-01 | End: 2020-01-01

## 2020-01-01 RX ORDER — HYDRALAZINE HCL 50 MG
25 TABLET ORAL
Refills: 0 | Status: DISCONTINUED | OUTPATIENT
Start: 2020-01-01 | End: 2020-01-01

## 2020-01-01 RX ADMIN — SENNA PLUS 2 TABLET(S): 8.6 TABLET ORAL at 21:08

## 2020-01-01 RX ADMIN — Medication 25 MILLIGRAM(S): at 17:51

## 2020-01-01 RX ADMIN — SENNA PLUS 2 TABLET(S): 8.6 TABLET ORAL at 22:41

## 2020-01-01 RX ADMIN — Medication 100 MILLIGRAM(S): at 03:00

## 2020-01-01 RX ADMIN — Medication 25 MILLIGRAM(S): at 05:42

## 2020-01-01 RX ADMIN — Medication 25 MILLIGRAM(S): at 15:53

## 2020-01-01 RX ADMIN — POLYETHYLENE GLYCOL 3350 17 GRAM(S): 17 POWDER, FOR SOLUTION ORAL at 12:32

## 2020-01-01 RX ADMIN — SODIUM CHLORIDE 80 MILLILITER(S): 9 INJECTION, SOLUTION INTRAVENOUS at 08:12

## 2020-01-01 RX ADMIN — ATENOLOL 25 MILLIGRAM(S): 25 TABLET ORAL at 17:51

## 2020-01-01 RX ADMIN — ATENOLOL 25 MILLIGRAM(S): 25 TABLET ORAL at 06:38

## 2020-01-01 RX ADMIN — Medication 10 MILLIGRAM(S): at 18:58

## 2020-01-01 RX ADMIN — Medication 25 MILLIGRAM(S): at 17:39

## 2020-01-01 RX ADMIN — SERTRALINE 25 MILLIGRAM(S): 25 TABLET, FILM COATED ORAL at 12:34

## 2020-01-01 RX ADMIN — ATENOLOL 25 MILLIGRAM(S): 25 TABLET ORAL at 07:22

## 2020-01-01 RX ADMIN — SERTRALINE 25 MILLIGRAM(S): 25 TABLET, FILM COATED ORAL at 12:33

## 2020-01-01 RX ADMIN — ATENOLOL 25 MILLIGRAM(S): 25 TABLET ORAL at 17:39

## 2020-01-01 RX ADMIN — OXYCODONE HYDROCHLORIDE 5 MILLIGRAM(S): 5 TABLET ORAL at 22:56

## 2020-01-01 RX ADMIN — ENOXAPARIN SODIUM 30 MILLIGRAM(S): 100 INJECTION SUBCUTANEOUS at 17:51

## 2020-01-01 RX ADMIN — ATENOLOL 25 MILLIGRAM(S): 25 TABLET ORAL at 08:11

## 2020-01-01 RX ADMIN — SERTRALINE 25 MILLIGRAM(S): 25 TABLET, FILM COATED ORAL at 15:53

## 2020-01-01 RX ADMIN — Medication 0.1 MILLIGRAM(S): at 23:24

## 2020-01-01 RX ADMIN — ENOXAPARIN SODIUM 30 MILLIGRAM(S): 100 INJECTION SUBCUTANEOUS at 22:41

## 2020-01-01 RX ADMIN — ONDANSETRON 4 MILLIGRAM(S): 8 TABLET, FILM COATED ORAL at 23:00

## 2020-01-01 RX ADMIN — Medication 25 MILLIGRAM(S): at 05:30

## 2020-01-01 RX ADMIN — Medication 25 MILLIGRAM(S): at 16:25

## 2020-01-01 RX ADMIN — Medication 81 MILLIGRAM(S): at 12:33

## 2020-01-01 RX ADMIN — MORPHINE SULFATE 2 MILLIGRAM(S): 50 CAPSULE, EXTENDED RELEASE ORAL at 12:31

## 2020-01-01 RX ADMIN — Medication 25 MILLIGRAM(S): at 00:29

## 2020-01-01 RX ADMIN — SODIUM CHLORIDE 100 MILLILITER(S): 9 INJECTION INTRAMUSCULAR; INTRAVENOUS; SUBCUTANEOUS at 03:22

## 2020-01-01 RX ADMIN — MORPHINE SULFATE 2 MILLIGRAM(S): 50 CAPSULE, EXTENDED RELEASE ORAL at 17:52

## 2020-01-01 RX ADMIN — ATENOLOL 25 MILLIGRAM(S): 25 TABLET ORAL at 17:58

## 2020-01-01 RX ADMIN — SENNA PLUS 2 TABLET(S): 8.6 TABLET ORAL at 21:07

## 2020-01-01 RX ADMIN — ATENOLOL 25 MILLIGRAM(S): 25 TABLET ORAL at 05:30

## 2020-01-01 RX ADMIN — Medication 25 MILLIGRAM(S): at 06:39

## 2020-01-01 RX ADMIN — SERTRALINE 25 MILLIGRAM(S): 25 TABLET, FILM COATED ORAL at 12:39

## 2020-01-01 RX ADMIN — Medication 25 MILLIGRAM(S): at 09:20

## 2020-01-01 RX ADMIN — Medication 10 MILLIGRAM(S): at 03:29

## 2020-01-01 RX ADMIN — MORPHINE SULFATE 2 MILLIGRAM(S): 50 CAPSULE, EXTENDED RELEASE ORAL at 23:00

## 2020-01-01 RX ADMIN — MAGNESIUM HYDROXIDE 30 MILLILITER(S): 400 TABLET, CHEWABLE ORAL at 12:33

## 2020-01-01 RX ADMIN — SENNA PLUS 2 TABLET(S): 8.6 TABLET ORAL at 22:19

## 2020-01-01 RX ADMIN — OXYCODONE HYDROCHLORIDE 5 MILLIGRAM(S): 5 TABLET ORAL at 22:26

## 2020-01-01 RX ADMIN — OXYCODONE HYDROCHLORIDE 5 MILLIGRAM(S): 5 TABLET ORAL at 15:53

## 2020-01-01 RX ADMIN — ENOXAPARIN SODIUM 30 MILLIGRAM(S): 100 INJECTION SUBCUTANEOUS at 12:39

## 2020-01-01 RX ADMIN — MORPHINE SULFATE 2 MILLIGRAM(S): 50 CAPSULE, EXTENDED RELEASE ORAL at 13:48

## 2020-01-01 RX ADMIN — Medication 100 MILLIGRAM(S): at 17:44

## 2020-01-01 RX ADMIN — ATENOLOL 25 MILLIGRAM(S): 25 TABLET ORAL at 05:42

## 2020-01-01 RX ADMIN — Medication 25 MILLIGRAM(S): at 07:22

## 2020-01-01 RX ADMIN — ATENOLOL 25 MILLIGRAM(S): 25 TABLET ORAL at 18:06

## 2020-01-01 RX ADMIN — SODIUM CHLORIDE 3 MILLILITER(S): 9 INJECTION INTRAMUSCULAR; INTRAVENOUS; SUBCUTANEOUS at 23:10

## 2020-01-01 RX ADMIN — MORPHINE SULFATE 2 MILLIGRAM(S): 50 CAPSULE, EXTENDED RELEASE ORAL at 00:57

## 2020-03-04 NOTE — ED PROVIDER NOTE - CONSTITUTIONAL, MLM
normal... (+) Appears uncomfortable, awake, alert, oriented to person, place, time/situation and in no apparent distress.

## 2020-03-04 NOTE — ED ADULT NURSE NOTE - OBJECTIVE STATEMENT
Assumed pt. care at 2245. Pt. A&Ox3. Pt. respirations even and unlabored. Pt. resting comfortably in stretcher. Pt. daughter states she was holding onto the bar in the bathroom and she fell down. Pt. daughter states she didn't see her fall. Pt. is complaining of right hip pain. Pt. denies hitting her head. Pt. denies being on blood thinners except baby aspirin. Pt. right leg is shortened and out warded turned.

## 2020-03-04 NOTE — ED PROVIDER NOTE - OBJECTIVE STATEMENT
Anxiety, Aortic stenosis, Afib, CHF, CKD, CLIFTON, HTN, and NSTEMI presents to ED c/o fall. Patient was holding on to a grab bar in the bathroom and as per daughter at bedside, patient just let go of the bar. Family was able to assist patient up off the floor to a chair when she began experiencing a lot of pain.

## 2020-03-05 NOTE — CONSULT NOTE ADULT - ASSESSMENT
A/P: Pt is a 89 y/o F with a PMHx of paroxysmal AFib (not on AC due to mechanical falls), severe AS s/p TAVR (2017), non-obstructive CAD, HFpEF, CKD, HTN, and moderate carotid stenosis who presented to the ED with complaints of mechanical fall. Patient is very hard of hearing, unable to get detailed history, obtained from the chart. Patient was waking out of the bathroom, let go of the holding bar, and fell onto the floor. Patient denies any head trauma or LOC, and states she had a lot of pain in her right hip. Patient otherwise has been feeling well, denies fevers, chills, CP, SOB, orthopnea, PND, LE edema, abdominal pain, N/V/D, headache, or dizziness.     1. Jaquan-operative Cardiac Risk Stratification   - RCRI risk 6.6%, Negron cardiac risk 0.3%  - Echo pending  - Patient is at elevated risk due to age and commorbidities, but is currently stable from a cardiac perspective. No absolute cardiac contraindications to proposed procedures.   - Continue atenolol jaquan-operatively.    2. HTN  - BP poorly controlled  - Restart home hydralazine  - Continue atenolol    3. Paroxysmal AFib  - Currently in NSR  - Continue atenolol  - Not on AC due to falls

## 2020-03-05 NOTE — CONSULT NOTE ADULT - SUBJECTIVE AND OBJECTIVE BOX
[facility]                                                               Office:  [address] [place] [address]                                                              Telephone: [phone]. Fax:[phone]                                                                        CARDIOLOGY CONSULTATION NOTE                                                                                             Consult requested by:  Dr. Joaquin  Reason for Consultation: Armida-operative Cardiac Risk Stratification   History obtained by: Patient and medical record   obtained: No    Chief complaint:    Patient is a 89y old  Female who presents with a chief complaint of Right hip fracture s/p mechanical fall (05 Mar 2020 04:12)    HPI: Pt is a 89 y/o F with a PMHx of paroxysmal AFib (not on AC due to mechanical falls), severe AS s/p TAVR (2017), non-obstructive CAD, HFpEF, CKD, HTN, and moderate carotid stenosis who presented to the ED with complaints of mechanical fall. Patient is very hard of hearing, unable to get detailed history, obtained from the chart. Patient was waking out of the bathroom, let go of the holding bar, and fell onto the floor. Patient denies any head trauma or LOC, and states she had a lot of pain in her right hip. Patient otherwise has been feeling well, denies fevers, chills, CP, SOB, orthopnea, PND, LE edema, abdominal pain, N/V/D, headache, or dizziness.     REVIEW OF SYMPTOMS:     CONSTITUTIONAL: No fever, weight loss, or fatigue  ENMT:  No difficulty hearing, tinnitus, vertigo; No sinus or throat pain  NECK: No pain or stiffness  CARDIOVASCULAR: AS PER HPI  RESPIRATORY: AS PER HPI  : No dysuria, no hematuria   GI: No dark color stool, no melena, no diarrhea, no constipation, no abdominal pain   NEURO: No headache, no dizziness, no slurred speech   MUSCULOSKELETAL: AS PER HPI  PSYCH: No agitation, no anxiety.    ALL OTHER REVIEW OF SYSTEMS ARE NEGATIVE.      PREVIOUS DIAGNOSTIC TESTING  ECHO FINDINGS:  < from: TTE Echo Complete w/Doppler (03.25.17 @ 08:43) >  PHYSICIAN INTERPRETATION:  Left Ventricle: The left ventricular internal cavity size is normal.   There is moderate concentric left ventricular hypertrophy.  Global LV systolic function was normal. Left ventricular ejection   fraction, by visual estimation, is 55 to 60%. Spectral Doppler shows   pseudonormal pattern of left ventricular myocardial filling (Grade II   diastolic dysfunction).  Right Ventricle: The right ventricular size is mildly enlarged. RV   systolic function is normal. TV S' 0.1 m/s.  Left Atrium: Mildly enlarged left atrium.  Right Atrium: The right atrium is mildly dilated.  Pericardium: Trivial pericardial effusion is present.  Mitral Valve: Thickening and calcification of the anterior and posterior   mitral valve leaflets. There is moderate mitral annular calcification.   Mod to severe MAC w/o stenosis and mild MR.  Tricuspid Valve: Moderate tricuspid regurgitation is visualized.   Estimated pulmonary artery systolic pressure is 54.6 mmHg assuming a   right atrial pressure of 3 mmHg, which is consistent with moderate   pulmonary hypertension.  Aortic Valve: Peak transaortic gradient equals 9.4 mmHg, mean transaortic   gradient equals 4.8 mmHg, the calculated aortic valve area equals 8.93   cm² by the continuity equation consistent with normally opening aortic   valve. Trivial aortic valve regurgitation is seen. S/p TAVR with normal   vavlve gradients and trivial paravalvular AI.  Pulmonic Valve: The pulmonic valve was not well visualized.  Aorta: The aortic root is normal in size and structure.  Pulmonary Artery: The pulmonary artery is not well seen.  Venous: The pulmonary veins were not well visualized. The inferior vena   cava is normal. The inferior vena cava was normal sized, with respiratory   size variation greater than 50%.        Summary:   1. Left ventricular ejection fraction, by visual estimation, is 55 to   60%.   2. Normalglobal left ventricular systolic function.   3. Spectral Doppler shows pseudonormal pattern of left ventricular   myocardial filling (Grade II diastolic dysfunction).   4. There is moderate concentric left ventricular hypertrophy.   5. Mod to severe MAC w/o stenosis and mild MR.   6. Mildly dilated right atrium.   7. Thickening and calcification of the anterior and posterior mitral   valve leaflets.   8. Moderate tricuspid regurgitation.   9. Bioprosthesis in the aortic position.  10. S/p TAVR with normal vavlve gradients and trivial paravalvular AI.  11. Estimated pulmonary artery systolic pressure is 54.6 mmHg assuming a   right atrial pressure of 3 mmHg, which is consistent with moderate   pulmonary hypertension.  12. Mildly enlarged left atrium.  13. Trivial pericardial effusion.     MD Janell Electronically signed on 3/25/2017 at 12:07:37 PM       < end of copied text >    CATHETERIZATION FINDINGS:   < from: Cardiac Cath Lab - Adult (03.13.17 @ 09:33) >  VENTRICLES: No LV gram was performed; however, a recent echocardiogram  demonstrated an EF of 55 %.  CORONARY VESSELS: The coronary circulation is right dominant.  LM:   --  LM: Inferior takeoff with 20-30% disease. No gradient with  catheter engagement.  LAD:   --  Mid LAD: There was a 50 % stenosis. The lesion was moderately  calcified.  --  D1: There was a 40 % stenosis.  CX:   --  Circumflex: There was a 20 % stenosis.  RCA:   --  Mid RCA: There was a 30 % stenosis.  COMPLICATIONS: No complications occurred during the cath lab visit.  DIAGNOSTIC IMPRESSIONS: Mild to moderate CAD.  Peripheral IVUS performed to assess for vessel sizing for potential  transfemoral TAVR.  Left common femoral - 6.5 mm, LIE- 7, LCI- 7, RCF-6, BLANCA-6, RCI-8. Distal  aorta with circumferential calcium but adequate sizing.  DIAGNOSTIC RECOMMENDATIONS: Consider JOHN for further annular sizing and  coronary heights considering inferior takeoff of LM. Due to advnaced age,  significant renal dysfunction, consider non contrast CT only.  Prepared and signed by  Tyler Haddad MD  Signed 03/14/2017 12:45:04    < end of copied text >    ALLERGIES: Allergies    Toprol-XL (Other; Short breath; Hypotension)    Intolerances    Health Shakes TID (Unknown)      PAST MEDICAL HISTORY  CKD (chronic kidney disease) stage 4, GFR 15-29 ml/min  Urine frequency  NSTEMI (non-ST elevated myocardial infarction)  Atrial fibrillation  HTN (hypertension)  Orthopnea  CLIFTON (dyspnea on exertion)  CHF (congestive heart failure)  Bruises easily  Aortic stenosis  Anxiety      PAST SURGICAL HISTORY  Status post transcatheter aortic valve replacement (TAVR) using bioprosthesis  H/O abdominal hysterectomy  History of cholecystectomy      FAMILY HISTORY:  No pertinent family history in first degree relatives      SOCIAL HISTORY:  Denies smoking/alcohol/drugs    CURRENT MEDICATIONS:  ATENolol  Tablet 25 milliGRAM(s) Oral daily     sertraline  ceFAZolin   IVPB  lactated ringers.  sodium chloride 0.9%.    HOME MEDICATIONS:  Home Medications:  furosemide 20 mg oral tablet: 1 tab(s) orally once a day, As Needed (24 Feb 2018 11:38)  hydrALAZINE 25 mg oral tablet:  (05 Mar 2020 04:46)  sertraline 25 mg oral tablet: 1 tab(s) orally once a day (05 Mar 2020 04:46)  Xanax 0.25 mg oral tablet:  orally once a day (at bedtime) (24 Feb 2018 11:38)      Vital Signs Last 24 Hrs  T(C): 36.7 (05 Mar 2020 08:00), Max: 36.7 (05 Mar 2020 08:00)  T(F): 98 (05 Mar 2020 08:00), Max: 98 (05 Mar 2020 08:00)  HR: 69 (05 Mar 2020 08:00) (68 - 73)  BP: 180/66 (05 Mar 2020 08:00) (178/69 - 237/73)  RR: 18 (05 Mar 2020 08:00) (18 - 20)  SpO2: 96% (05 Mar 2020 08:00) (91% - 96%)      PHYSICAL EXAM:  Constitutional: Comfortable . No acute distress.   HEENT: Atraumatic and normocephalic , neck is supple . no JVD. No carotid bruit. PEERL   CNS: A&Ox3. No focal deficits. EOMI. Cranial nerves II-IX are intact.   Lymph Nodes: Cervical : Not palpable.  Respiratory: CTAB  Cardiovascular: S1S2 RRR. No rubs or gallop. III/VI systolic murmur lsb,   Gastrointestinal: Soft non-tender and non distended . +Bowel sounds. negative Haywood's sign.  Extremities: No edema.   Psychiatric: Calm . no agitation.  Skin: No skin rash/ulcers visualized to face, hands or feet.    Intake and output:     LABS:                        11.0   13.28 )-----------( 257      ( 05 Mar 2020 01:02 )             33.3     03-05    137  |  100  |  72.0<H>  ----------------------------<  120<H>  4.6   |  20.0<L>  |  1.72<H>    Ca    9.1      05 Mar 2020 01:02    TPro  7.2  /  Alb  4.0  /  TBili  0.2<L>  /  DBili  x   /  AST  22  /  ALT  15  /  AlkPhos  72  03-05      ;p-BNP=  PT/INR - ( 05 Mar 2020 01:02 )   PT: 11.1 sec;   INR: 0.98 ratio         PTT - ( 05 Mar 2020 01:02 )  PTT:29.1 sec      INTERPRETATION OF TELEMETRY: Not on telemetry   ECG: NSR, LVH with repolarization abnormalities, T wave inversions anterolaterally     RADIOLOGY & ADDITIONAL STUDIES:    X-ray:  reviewed by me.   < from: Xray Chest 1 View AP/PA. (03.05.20 @ 00:51) >     EXAM:  XR CHEST AP OR PA 1V                          PROCEDURE DATE:  03/05/2020          INTERPRETATION:  Portable chest radiograph        CLINICAL INFORMATION:   Admission chest radiograph    TECHNIQUE:  Portable  AP view of the chest was obtained.    COMPARISON: 2/24/2018, 3/27/2017 chest radiograph available for review.    FINDINGS:   The lungs  are clear.  No pleural abnormality is seen.    The heart. Status post cardiac valve replacement  and mediastinum are within normal limits.    Visualized osseous structures are intact.        IMPRESSION:   No interval change..      < end of copied text >    CT scan:   < from: CT Head No Cont (03.05.20 @ 01:26) >     EXAM:  CT BRAIN                          PROCEDURE DATE:  03/05/2020          INTERPRETATION:  CLINICAL INFORMATION: Status post fall.    COMPARISON: CT head of 1/11/2019.    PROCEDURE:   Noncontrast CT of the Head was performed from the skull base to the vertex. Coronal and Sagittal reformats were obtained.    FINDINGS:    There is no acute intracranial hemorrhage, mass effect, midline shift, extra-axial collection, hydrocephalus, or evidence of acute vascular territorial infarction. Moderatepatchy hypodensities within the periventricular and subcortical white matter, although nonspecific, likely reflect chronic microvascular disease.    Small air-fluid level within the left maxillary sinus. Opacified right frontal sinus. Clear mastoid air cells. No calvarial fracture.    IMPRESSION:     No acute intracranial hemorrhage or calvarial fracture.    Nonspecific trace air-fluid level within the left maxillary sinus and opacification of the right frontal sinus. Correlate for sinusitis.    < end of copied text >

## 2020-03-05 NOTE — PROGRESS NOTE ADULT - SUBJECTIVE AND OBJECTIVE BOX
Patient is a 89y old  Female who presents with a chief complaint of Right hip fracture s/p mechanical fall (05 Mar 2020 08:50)    Patient seen and examined at bedside.     ALLERGIES:  Health Shakes TID (Unknown)  Toprol-XL (Other; Short breath; Hypotension)    MEDICATIONS  (STANDING):  ATENolol  Tablet 25 milliGRAM(s) Oral every 12 hours  ceFAZolin   IVPB 2000 milliGRAM(s) IV Intermittent once  hydrALAZINE 25 milliGRAM(s) Oral every 12 hours  lactated ringers. 1000 milliLiter(s) (80 mL/Hr) IV Continuous <Continuous>  sertraline 37.5 milliGRAM(s) Oral daily  sodium chloride 0.9%. 1000 milliLiter(s) (100 mL/Hr) IV Continuous <Continuous>    MEDICATIONS  (PRN):  morphine  - Injectable 2 milliGRAM(s) IV Push every 6 hours PRN Moderate Pain (4 - 6)    Vital Signs Last 24 Hrs  T(F): 98 (05 Mar 2020 08:00), Max: 98 (05 Mar 2020 08:00)  HR: 69 (05 Mar 2020 08:00) (68 - 73)  BP: 180/66 (05 Mar 2020 08:00) (178/69 - 237/73)  RR: 18 (05 Mar 2020 08:00) (18 - 20)  SpO2: 96% (05 Mar 2020 08:00) (91% - 96%)  I&O's Summary    PHYSICAL EXAM:  General: NAD, Alert; hard of hearing  ENT: MMM, no thrush  Neck: Supple, No JVD  Lungs: Clear to auscultation bilaterally, good air entry, non-labored breathing  Cardio: +s1/s2, No pitting edema  Abdomen: Soft, Nontender, Nondistended; Bowel sounds present  Extremities: No calf tenderness; +pain with palpation of right hip    LABS:                        11.0   13.28 )-----------( 257      ( 05 Mar 2020 01:02 )             33.3     03-05    137  |  100  |  72.0  ----------------------------<  120  4.6   |  20.0  |  1.72    Ca    9.1      05 Mar 2020 01:02    TPro  7.2  /  Alb  4.0  /  TBili  0.2  /  DBili  x   /  AST  22  /  ALT  15  /  AlkPhos  72  03-05    eGFR if Non African American: 24 mL/min/1.73M2 (03-05-20 @ 01:02)  eGFR if African American: 28 mL/min/1.73M2 (03-05-20 @ 01:02)    PT/INR - ( 05 Mar 2020 01:02 )   PT: 11.1 sec;   INR: 0.98 ratio    PTT - ( 05 Mar 2020 01:02 )  PTT:29.1 sec    RADIOLOGY & ADDITIONAL TESTS:  < from: CT Head No Cont (03.05.20 @ 01:26) >  IMPRESSION:   No acute intracranial hemorrhage or calvarial fracture.  Nonspecific trace air-fluid level within the left maxillary sinus and opacification of the right frontal sinus.  < end of copied text >    < from: Xray Chest 1 View AP/PA. (03.05.20 @ 00:51) >  FINDINGS:   The lungs  are clear.  No pleural abnormality is seen.  The heart. Status post cardiac valve replacement  and mediastinum are within normal limits.  Visualized osseous structures are intact.  < end of copied text >    < from: Xray Hip w/ Pelvis 2 or 3 Views, Right (03.04.20 @ 23:49) >  IMPRESSION:    RIGHT hip displaced subcapital fracture deformity.  < end of copied text >    < from: TTE Echo Complete w/ Contrast w/ Doppler (03.05.20 @ 11:15) >  Summary:   1. Normal global left ventricular systolic function.   2. Left ventricular ejection fraction, by visual estimation, is 60 to 65%.   3. There is moderate concentric left ventricular hypertrophy.   4. Indeterminate left ventricle diastolic function in the setting of severe mitral annular calcification. The apex appears dyskinetic.   5. Normal right ventricle size and systolic function.   6. Moderately enlarged leftatrium.   7. Dilated right atrium.   8. Mild thickening and calcification of the anterior and posterior mitral valve leaflets.   9. Severe mitral annular calcification.  10. Elevated mean gradient (   5 mmHg) HR 67 BPM.  11. Mild-moderate tricuspid regurgitation.  12. Mild to moderate aortic regurgitation.  13. S/p TAVR, peak velocity 1.5 m/s.  14. Trivial pericardial effusion.  15. Estimated pulmonary artery systolic pressure is 59.0 mmHg assuming a right atrial pressure of 3 mmHg, which is consistent with moderate pulmonary hypertension.  16. Recommend clinical correlation with the above findings.  < end of copied text >      Care Discussed with Consultants/Other Providers:

## 2020-03-05 NOTE — H&P ADULT - ASSESSMENT
89 y/o female with PMH of Aortic stenosis s/p TAVR, Afib not on AC due to frequent fall, CHFpEF, CKD-4, HTN, came to the ED s/p mechanical fall. Patient said she was in the bathroom holding a bar, let go and fell. Did not hit her head. Witness by family who help her get up from the floor but she was unable to bear weight on her right leg. She has no LOC, fever, chills, chest pain, shortness of breath, nausea, vomiting, abdominal pain, change in bowel/urinary habit, HA.     Complete med rec in AM please     Right hip fracture s/p fall  Admit to medical floor   CT head: no acute intracranial finding   Ortho on board, planned for OR in AM   Cardiology consult for clearance given cardiac hx   TTE ordered, follow up   ECG pending   Pain control     Afib   Not on AC due to frequent fall  Continue Atenolol 25mg bid     CKD-4   Cr stable   Monitor renal function     HTN   Hydralazine 25mg (unknown frequency)     Supportive   DVT prophylaxis: chemical px on hold for OR, start as needed   Diet: NPO

## 2020-03-05 NOTE — ED ADULT NURSE REASSESSMENT NOTE - NS ED NURSE REASSESS COMMENT FT1
Pt. mental status unchanged. Pt. respirations even and unlabored. Pt. oxygen sating at 90-91%. Pt. placed on 2LNC. MD Avitia made aware. Pt. resting comfortably after morphine. P.t made aware she will move to the holding room pending a bed.

## 2020-03-05 NOTE — PROGRESS NOTE ADULT - ASSESSMENT
89 y/o female with PMH of Aortic stenosis s/p TAVR, Afib not on AC due to frequent fall, CHFpEF, CKD-4, HTN, came to the ED s/p mechanical fall. Patient said she was in the bathroom holding a bar, let go and fell. Did not hit her head. Witness by family who help her get up from the floor but she was unable to bear weight on her right leg. She has no LOC, fever, chills, chest pain, shortness of breath, nausea, vomiting, abdominal pain, change in bowel/urinary habit, HA.     #Right hip fracture s/p fall  - imaging as above   - ortho consult appreciated- OR in AM   - cardiology consult appreciated   - pain mgmt  - npo after midnight    #Afib   - Not on AC due to frequent fall  - atenolol and hydralyzine  - cardiology consult appreciated     #CKD-4   - monitor cr   - avoid nephrotoxic medications    #HTN   - monitor blood pressure  - hydralazine    #DVT prophylaxis  - venodynes - hold chemical prophylaxis due to OR via orthopedics tomorrow

## 2020-03-05 NOTE — PROGRESS NOTE ADULT - SUBJECTIVE AND OBJECTIVE BOX
Pt is a 89 y/o F with a PMHx of paroxysmal AFib (not on AC due to mechanical falls), severe AS s/p TAVR (2017), non-obstructive CAD, HFpEF, CKD, HTN, and moderate carotid stenosis who presented to the ED with complaints of mechanical fall  pt is scheduled for a hip hemiarthroplasty on 3/6/20  ECHO from 2017 indicated moderate pulmonary hypertension    repeat ECHO is pending. pt has been evaluated by cardiology. moderate risk patient for moderate risk surgery  ASA 4.  general anesthesia.    type and cross    Dr fozia carlos

## 2020-03-05 NOTE — H&P ADULT - HISTORY OF PRESENT ILLNESS
89 y/o female with PMH of Aortic stenosis s/p TAVR, Afib not on AC due to frequent fall, CHFpEF, CKD-4, HTN, came to the ED s/p mechanical fall. Patient said she was in the bathroom holding a bar, let go and fell. Did not hit her head. Witness by family who help her get up from the floor but she was unable to bear weight on her right leg. She has no LOC, fever, chills, chest pain, shortness of breath, nausea, vomiting, abdominal pain, change in bowel/urinary habit, HA.

## 2020-03-05 NOTE — CONSULT NOTE ADULT - ATTENDING COMMENTS
Ortho Trauma Attending:  Agree with above PA note.  Note edited where necessary.      Orthopedic Surgery is ready to proceed with surgery pending medical optimization and adequate Operating Room availability. Risks of surgical delay discussed with other providers and staff. Please call with any questions or concerns.     David Renteria MD  Orthopaedic Trauma Surgery
Mr Camacho was seen and examined. She is s/p fall and will need surgery. She is s/p TAVR and on exam there is no significant murmur.  She is stable to proceed with surgery.  I reviewed the above and agree with a/p.

## 2020-03-05 NOTE — CHART NOTE - NSCHARTNOTEFT_GEN_A_CORE
Pt h/o dyspnea on exertion, orthopnea. Pt was put on O2 in ER on admission. Pt does not use home O2 anymore. Called by nurse because pt desat to 80s when pt takes O2 off  Pt sleeping, NAD breathing easy and unlabored, pt appears comfortable  R 16 PO 91% on 4 L  Lungs clear  Called daughter Cristina Evans  would like mom to be comfortable, she is aware pt is on O2 via NC., she does not want any labs or radiology at this time   Pt sleeping, NAD, comfortable  4 L NC and continuous pulse ox ordered  continue to monitor

## 2020-03-05 NOTE — H&P ADULT - NSICDXPASTSURGICALHX_GEN_ALL_CORE_FT
PAST SURGICAL HISTORY:  H/O abdominal hysterectomy     History of cholecystectomy     Status post transcatheter aortic valve replacement (TAVR) using bioprosthesis

## 2020-03-05 NOTE — CONSULT NOTE ADULT - SUBJECTIVE AND OBJECTIVE BOX
Pt Name: GARETH ANDERSEN    MRN: [id number]      Patient is a 89y Female w/ pmhx of Aortic stenosis, Afib, CHF, CKD, CLIFTON, HTN, and NSTEMI presenting to ED s/p mechanical fall at home. Patient reports immediate pain s/p fall. Takes plavix. No other orthopedic complaints at this time. Denies acute sensory or motor changes. Patient uses walker at baseline.       REVIEW OF SYSTEMS    General: Alert, responsive, in NAD  Skin/Breast: No rashes, no pruritis   Ophthalmologic: No visual changes. No redness.   ENMT:	No discharge. No swelling.  Respiratory and Thorax: No difficulty breathing. No cough.   Cardiovascular:	No chest pain. No palpitations.  Gastrointestinal:	 No abdominal pain. No diarrhea.   Genitourinary: No dysuria. No bleeding.  Musculoskeletal: SEE HPI.  Neurological: No sensory or motor changes.   ROS is otherwise negative.    PAST MEDICAL & SURGICAL HISTORY:  PAST MEDICAL & SURGICAL HISTORY:  CKD (chronic kidney disease) stage 4, GFR 15-29 ml/min  Urine frequency  NSTEMI (non-ST elevated myocardial infarction)  Atrial fibrillation  HTN (hypertension)  Orthopnea  CLIFTON (dyspnea on exertion)  CHF (congestive heart failure)  Bruises easily  Aortic stenosis  Anxiety  Status post transcatheter aortic valve replacement (TAVR) using bioprosthesis  H/O abdominal hysterectomy  History of cholecystectomy      Allergies: Health Shakes TID (Unknown)  Toprol-XL (Other; Short breath; Hypotension)      Medications: sodium chloride 0.9%. 1000 milliLiter(s) IV Continuous <Continuous>      FAMILY HISTORY:  No pertinent family history in first degree relatives  : non-contributory    Social History:     Ambulation: walker                          11.0   13.28 )-----------( 257      ( 05 Mar 2020 01:02 )             33.3       03-05    137  |  100  |  72.0<H>  ----------------------------<  120<H>  4.6   |  20.0<L>  |  1.72<H>    Ca    9.1      05 Mar 2020 01:02    TPro  7.2  /  Alb  4.0  /  TBili  0.2<L>  /  DBili  x   /  AST  22  /  ALT  15  /  AlkPhos  72  03-05      Vital Signs Last 24 Hrs  T(C): 36.4 (05 Mar 2020 02:24), Max: 36.6 (04 Mar 2020 22:19)  T(F): 97.5 (05 Mar 2020 02:24), Max: 97.9 (04 Mar 2020 22:19)  HR: 68 (05 Mar 2020 03:06) (68 - 73)  BP: 178/69 (05 Mar 2020 03:06) (178/69 - 237/73)  BP(mean): --  RR: 18 (05 Mar 2020 03:06) (18 - 20)  SpO2: 94% (05 Mar 2020 03:06) (91% - 95%)    Daily Height in cm: 157.48 (04 Mar 2020 22:19)    Daily       PHYSICAL EXAM:      Appearance: Alert, responsive, in no acute distress.    Injured extremity (RLE):  Skin: no rash on visible skin. Skin is clean, dry and intact. No bleeding. No abrasions. No ulcerations. TTP at lateral hip  Neurological: Sensation is grossly intact to light touch.   Motor: +DF/PF  Vascular: 2+ distal pulses. Cap refill < 2 sec. No signs of venous insufficiency or stasis. No extremity ulcerations. No cyanosis.    Imaging Studies:    A/P:  Pt is a  89y Female with right hip fracture    PLAN:   * NPO for OR  * IV fluids ordered and to start once NPO  * Pre-operative ABX ordered  * Routine daily anticoagulation held for OR  * Medical clearance requested for procedure  * Bed rest

## 2020-03-05 NOTE — H&P ADULT - MUSCULOSKELETAL
detailed exam no joint swelling/no joint erythema/no joint warmth/no calf tenderness/decreased ROM due to pain

## 2020-03-05 NOTE — H&P ADULT - NSICDXPASTMEDICALHX_GEN_ALL_CORE_FT
PAST MEDICAL HISTORY:  Anxiety     Aortic stenosis     Atrial fibrillation     Bruises easily     CHF (congestive heart failure)     CKD (chronic kidney disease) stage 4, GFR 15-29 ml/min     CLIFTON (dyspnea on exertion)     HTN (hypertension)     NSTEMI (non-ST elevated myocardial infarction)     Orthopnea     Urine frequency

## 2020-03-06 NOTE — PROGRESS NOTE ADULT - ASSESSMENT
89 y/o female with PMH of Aortic stenosis s/p TAVR, Afib not on AC due to frequent fall, CHFpEF, CKD-4, HTN, came to the ED s/p mechanical fall. Patient said she was in the bathroom holding a bar, let go and fell. Did not hit her head. Witness by family who help her get up from the floor but she was unable to bear weight on her right leg. She has no LOC, fever, chills, chest pain, shortness of breath, nausea, vomiting, abdominal pain, change in bowel/urinary habit, HA.     #Right hip fracture s/p fall  - imaging as above   - ortho consult appreciated- OR today  - cardiology consult appreciated   - pain mgmt  - npo for OR    #Afib   - Not on AC due to frequent fall  - atenolol and hydralyzine  - cardiology consult appreciated     #CKD-4   - monitor cr   - avoid nephrotoxic medications    #HTN   - monitor blood pressure  - hydralazine    #DVT prophylaxis  - venodynes - hold chemical prophylaxis due to OR - can start chemical a/c when cleared by ortho

## 2020-03-06 NOTE — CHART NOTE - NSCHARTNOTEFT_GEN_A_CORE
called by rn for patient with dysphagia hx    per rn patient with hx of this at points at home to where patient feels food gets stuck in her esophagus    will downgrade diet to full liquid  speech and swallow consult for am     d/w RN Staff

## 2020-03-06 NOTE — PROGRESS NOTE ADULT - SUBJECTIVE AND OBJECTIVE BOX
Patient is a 89y old  Female who presents with a chief complaint of Right hip fracture s/p mechanical fall (05 Mar 2020 15:47)      Patient seen and examined at bedside. No overnight events reported.     ALLERGIES:  Health Shakes TID (Unknown)  Toprol-XL (Other; Short breath; Hypotension)    MEDICATIONS  (STANDING):  ATENolol  Tablet 25 milliGRAM(s) Oral every 12 hours  ceFAZolin   IVPB 2000 milliGRAM(s) IV Intermittent once  hydrALAZINE 25 milliGRAM(s) Oral every 12 hours  lactated ringers. 1000 milliLiter(s) (80 mL/Hr) IV Continuous <Continuous>  sertraline 37.5 milliGRAM(s) Oral daily  sodium chloride 0.9%. 1000 milliLiter(s) (100 mL/Hr) IV Continuous <Continuous>    MEDICATIONS  (PRN):  morphine  - Injectable 2 milliGRAM(s) IV Push every 6 hours PRN Moderate Pain (4 - 6)  oxyCODONE    IR 5 milliGRAM(s) Oral every 8 hours PRN Mild Pain (1 - 3)    Vital Signs Last 24 Hrs  T(F): 97.6 (06 Mar 2020 04:23), Max: 98.6 (05 Mar 2020 20:05)  HR: 65 (06 Mar 2020 04:23) (65 - 78)  BP: 153/60 (06 Mar 2020 04:23) (146/61 - 164/64)  RR: 18 (06 Mar 2020 04:23) (14 - 19)  SpO2: 96% (06 Mar 2020 04:23) (89% - 96%)  I&O's Summary    05 Mar 2020 07:01  -  06 Mar 2020 07:00  --------------------------------------------------------  IN: 720 mL / OUT: 0 mL / NET: 720 mL    PHYSICAL EXAM:  General: NAD, Alert; hard of hearing  ENT: MMM, no thrush  Neck: Supple, No JVD  Lungs: Clear to auscultation bilaterally, good air entry, non-labored breathing  Cardio: +s1/s2, No pitting edema  Abdomen: Soft, Nontender, Nondistended; Bowel sounds present  Extremities: No calf tenderness; +pain with palpation of right hip      LABS:                        8.8    10.43 )-----------( 201      ( 06 Mar 2020 07:16 )             27.4     03-06    137  |  101  |  68.0  ----------------------------<  90  5.2   |  21.0  |  2.12    Ca    8.4      06 Mar 2020 07:16    TPro  7.2  /  Alb  4.0  /  TBili  0.2  /  DBili  x   /  AST  22  /  ALT  15  /  AlkPhos  72  03-05        eGFR if Non African American: 18 mL/min/1.73M2 (03-06-20 @ 07:16)  eGFR if : 21 mL/min/1.73M2 (03-06-20 @ 07:16)    PT/INR - ( 05 Mar 2020 01:02 )   PT: 11.1 sec;   INR: 0.98 ratio    PTT - ( 05 Mar 2020 01:02 )  PTT:29.1 sec    RADIOLOGY & ADDITIONAL TESTS:  < from: CT Head No Cont (03.05.20 @ 01:26) >  IMPRESSION:   No acute intracranial hemorrhage or calvarial fracture.  Nonspecific trace air-fluid level within the left maxillary sinus and opacification of the right frontal sinus.  < end of copied text >    < from: Xray Chest 1 View AP/PA. (03.05.20 @ 00:51) >  FINDINGS:   The lungs  are clear.  No pleural abnormality is seen.  The heart. Status post cardiac valve replacement  and mediastinum are within normal limits.  Visualized osseous structures are intact.  < end of copied text >    < from: Xray Hip w/ Pelvis 2 or 3 Views, Right (03.04.20 @ 23:49) >  IMPRESSION:    RIGHT hip displaced subcapital fracture deformity.  < end of copied text >    < from: TTE Echo Complete w/ Contrast w/ Doppler (03.05.20 @ 11:15) >  Summary:   1. Normal global left ventricular systolic function.   2. Left ventricular ejection fraction, by visual estimation, is 60 to 65%.   3. There is moderate concentric left ventricular hypertrophy.   4. Indeterminate left ventricle diastolic function in the setting of severe mitral annular calcification. The apex appears dyskinetic.   5. Normal right ventricle size and systolic function.   6. Moderately enlarged leftatrium.   7. Dilated right atrium.   8. Mild thickening and calcification of the anterior and posterior mitral valve leaflets.   9. Severe mitral annular calcification.  10. Elevated mean gradient (   5 mmHg) HR 67 BPM.  11. Mild-moderate tricuspid regurgitation.  12. Mild to moderate aortic regurgitation.  13. S/p TAVR, peak velocity 1.5 m/s.  14. Trivial pericardial effusion.  15. Estimated pulmonary artery systolic pressure is 59.0 mmHg assuming a right atrial pressure of 3 mmHg, which is consistent with moderate pulmonary hypertension.  16. Recommend clinical correlation with the above findings.  < end of copied text >

## 2020-03-06 NOTE — BRIEF OPERATIVE NOTE - COMMENTS
post-op plan: wbat, pt/ot, posterior hip precautions, ancef per scip, lmwh or equivalent x 4 weeks, f/u ortho 3/27 & prn after that

## 2020-03-07 NOTE — OCCUPATIONAL THERAPY INITIAL EVALUATION ADULT - GENERAL OBSERVATIONS, REHAB EVAL
Pt received supine in bed, +bilateral VCDs, +IV, +primafit, +cardiac monitor, +O2 via nasal canula, agreeable to OT eval.

## 2020-03-07 NOTE — SWALLOW BEDSIDE ASSESSMENT ADULT - SWALLOW EVAL: DIAGNOSIS
Oral and pharyngeal stages of swallow appear functional. No overt s/s aspiration at bedside, or c/o globus sensation for administered consistencies. Pt did report some difficulty cutting her foods and cited preference for mechanical soft consistency

## 2020-03-07 NOTE — DISCHARGE NOTE PROVIDER - CARE PROVIDERS DIRECT ADDRESSES
,[email or web address],DirectAddress_Unknown

## 2020-03-07 NOTE — PHYSICAL THERAPY INITIAL EVALUATION ADULT - GENERAL OBSERVATIONS, REHAB EVAL
Patient received lying in bed, NAD, breathing O2 via NC, +tele, +primafit. Pt agreeable to Physical Therapy evaluation.

## 2020-03-07 NOTE — DISCHARGE NOTE PROVIDER - CARE PROVIDER_API CALL
David Renteria)  Orthopaedic Surgery  [address] [place] [address]  Phone: [phone]  Fax: [phone]  Follow Up Time: David Renteria)  Orthopaedic Surgery  [address] [place] [address]  Phone: [phone]  Fax: [phone]  Follow Up Time:     KATHERINE Lofton  Phone: (   )    -  Fax: (   )    -  Follow Up Time:

## 2020-03-07 NOTE — PROGRESS NOTE ADULT - SUBJECTIVE AND OBJECTIVE BOX
ORTHO-POST-OP PROGRESS NOTE:  [id number]    GARETH ANDERSEN    PROCEDURE: Right hip serge  DOS: 3/6      SUBJECTIVE: 89y Patient seen and examined. Difficulty hearing. Patient reports of moderate discomfort that is controlled by pain medications. Patient denies of acute sensory or motor changes.                             8.5    11.86 )-----------( 191      ( 06 Mar 2020 12:30 )             26.6       I&O's Detail    05 Mar 2020 07:01  -  06 Mar 2020 07:00  --------------------------------------------------------  IN:    lactated ringers.: 720 mL  Total IN: 720 mL    OUT:  Total OUT: 0 mL    Total NET: 720 mL      06 Mar 2020 07:01  -  07 Mar 2020 00:21  --------------------------------------------------------  IN:    lactated ringers.: 160 mL  Total IN: 160 mL    OUT:  Total OUT: 0 mL    Total NET: 160 mL      T(C): 36.7 (03-06-20 @ 19:56), Max: 36.7 (03-06-20 @ 08:58)  HR: 68 (03-06-20 @ 19:56) (60 - 71)  BP: 147/61 (03-06-20 @ 19:56) (111/38 - 153/60)  RR: 19 (03-06-20 @ 19:56) (12 - 72)  SpO2: 95% (03-06-20 @ 19:56) (92% - 96%)  Wt(kg): --      PHYSICAL EXAM:     Constitutional: Alert, responsive, in no acute distress.     Injured Extremity:          Dressing: Clean/dry/intact. No active bleeding or discharge noted.          Skin: Wound is clean and intact. No active discharge. No wound dehiscence.          Sensation: normal to light touch. No focal deficits noted of the lower extremities.              Motor exam: +DF/PF/EHL/FHL                                                           Vascular:  + warm well perfused; capillary refill <3 seconds                                                       A/P :  89y Female S/P right hip serge POD# 0    -  Pain control  -  DVT ppx: [x]SCDs   [x] Pharmacolgic    -  PT and out of bed today  -  Weight bearing status: WBAT  -  Continue care as per primary  -  Ancef per scip  -  Posterior precautions

## 2020-03-07 NOTE — SWALLOW BEDSIDE ASSESSMENT ADULT - DIET PRIOR TO ADMI
?regular/thin; pt questionable historian, but notes that she ate a variety of foods and thin liquids PTA

## 2020-03-07 NOTE — OCCUPATIONAL THERAPY INITIAL EVALUATION ADULT - NS ASR FOLLOW COMMAND OT EVAL
75% of the time/[place] despite hearing aides contributing to difficulty following even with repetition, increased volume

## 2020-03-07 NOTE — SWALLOW BEDSIDE ASSESSMENT ADULT - ADDITIONAL RECOMMENDATIONS
If pt demonstrates difficulty swallowing and/or c/o globus sensation, please reconsult and also consider GI consult to r/o esophageal dysphagia

## 2020-03-07 NOTE — DISCHARGE NOTE PROVIDER - HOSPITAL COURSE
Patient is a 89 y/o female with PMH of Aortic stenosis s/p TAVR, Afib not on AC due to frequent fall, CHFpEF, CKD-4, HTN, came to the ED s/p mechanical fall. Patient said she was in the bathroom holding a bar, let go and fell. Did not hit her head. Witness by family who helped her get up from the floor; however she was unable to bear weight on her right leg. She had no LOC, fever, chills, chest pain, shortness of breath, nausea, vomiting, abdominal pain, change in bowel/urinary habit, HA. Found with displaced R femur fracture , s/p R hemiarthroplasty. Postop complicated with acute blood lost anemia due to surgical blood lost , likely on chronic anemia , 1 unit PRBC POD # 1 , also noted with worsening of renal function , given ivf with improvement.  BP elevated and medications adjusted.  Patient evaluated by speech and swallow during hospitalization and recommended soft diet with     thin liquids. Patient also found to have hypoxia at night, likely RITIKA, recommended outpatient sleep study.        Vital Signs Last 24 Hrs    T(C): 36.4 (10 Mar 2020 07:56), Max: 36.4 (09 Mar 2020 19:54)    T(F): 97.5 (10 Mar 2020 07:56), Max: 97.5 (09 Mar 2020 19:54)    HR: 62 (10 Mar 2020 07:56) (62 - 69)    BP: 155/57 (10 Mar 2020 07:56) (115/49 - 198/60)    BP(mean): --    RR: 18 (10 Mar 2020 07:56) (18 - 20)    SpO2: 93% (10 Mar 2020 07:56) (93% - 97%)        PHYSICAL EXAM:    GENERAL: NAD    HEAD:  Atraumatic, Normocephalic    NECK: Supple, No JVD, Normal thyroid    NERVOUS SYSTEM:  Alert & Oriented X3, Good concentration; Motor Strength 5/5 B/L upper and lower extremities    CHEST/LUNG: Clear to auscultation bilaterally; No rales, rhonchi, wheezing, or rubs    HEART: Regular rate and rhythm; No murmurs, rubs, or gallops    ABDOMEN: Soft, Nontender, Nondistended; Bowel sounds present    EXTREMITIES:  2+ Peripheral Pulses, right hip with clean dressing Patient is a 89 y/o female with PMH of Aortic stenosis s/p TAVR, Afib not on AC due to frequent fall, CHFpEF, CKD-4, HTN, came to the ED s/p mechanical fall. Patient said she was in the bathroom holding a bar, let go and fell. Did not hit her head. Witness by family who helped her get up from the floor; however she was unable to bear weight on her right leg. She had no LOC, fever, chills, chest pain, shortness of breath, nausea, vomiting, abdominal pain, change in bowel/urinary habit, HA. Found with displaced R femur fracture , s/p R hemiarthroplasty. Postop complicated with acute blood lost anemia due to surgical blood lost , likely on chronic anemia , 1 unit PRBC POD # 1 , also noted with worsening of renal function , given ivf with improvement.  BP elevated and medications adjusted.  Patient evaluated by speech and swallow during hospitalization and recommended soft diet with     thin liquids. Patient also found to have hypoxia at night, likely RITIKA, recommended outpatient sleep study.        Vital Signs Last 24 Hrs    T(C): 36.4 (10 Mar 2020 07:56), Max: 36.4 (09 Mar 2020 19:54)    T(F): 97.5 (10 Mar 2020 07:56), Max: 97.5 (09 Mar 2020 19:54)    HR: 62 (10 Mar 2020 07:56) (62 - 69)    BP: 155/57 (10 Mar 2020 07:56) (115/49 - 198/60)    BP(mean): --    RR: 18 (10 Mar 2020 07:56) (18 - 20)    SpO2: 93% (10 Mar 2020 07:56) (93% - 97%)        PHYSICAL EXAM:    GENERAL: NAD    HEAD:  Atraumatic, Normocephalic    NECK: Supple, No JVD, Normal thyroid    NERVOUS SYSTEM:  Alert & Oriented X3, Good concentration; Motor Strength 5/5 B/L upper and lower extremities    CHEST/LUNG: Clear to auscultation bilaterally; No rales, rhonchi, wheezing, or rubs    HEART: Regular rate and rhythm; No murmurs, rubs, or gallops    ABDOMEN: Soft, Nontender, Nondistended; Bowel sounds present    EXTREMITIES:  2+ Peripheral Pulses, right hip with clean dressing , b/l LE chronic changes +     Daughter at bed side , informed that mother had couple episodes of BP being on higher side ,     likely due to pain / anxiety     advised to increase Hydralazine to TID or even QID if BP remains > 150 ,     follow up with PMD in 1-2 if BP remains on higher side .         Patient seen and examined by me personally , comfortable , pain well controlled ,     no n/v , voiding without difficulties , no sob / cp ,     had 2 BM last night , wishes to go Home , daughter at bed side .     BP better controlled this am ,     labs noted , renal function improved .     Above noted .     More than 30 min spent with patient / daughter / nurse .

## 2020-03-07 NOTE — PROGRESS NOTE ADULT - SUBJECTIVE AND OBJECTIVE BOX
GARETH ANDERSEN    [id number]    Patient seen and examined status post right hip hemiarthroplasty, POD # 1. Patient is doing well. The patient's pain is controlled using the prescribed pain medications.  Denies nausea, vomiting, chest pain, shortness of breath, abdominal pain, dizziness. No new complaints.    Vital Signs Last 24 Hrs  T(C): 36.7 (07 Mar 2020 05:02), Max: 36.7 (06 Mar 2020 08:58)  T(F): 98 (07 Mar 2020 05:02), Max: 98.1 (06 Mar 2020 11:55)  HR: 54 (07 Mar 2020 05:02) (54 - 71)  BP: 112/50 (07 Mar 2020 05:02) (96/55 - 151/40)  BP(mean): --  RR: 18 (07 Mar 2020 05:02) (12 - 72)  SpO2: 95% (07 Mar 2020 05:02) (94% - 96%)                          7.2    7.53  )-----------( 166      ( 07 Mar 2020 08:03 )             22.3     03-06    137  |  101  |  68.0<H>  ----------------------------<  90  5.2   |  21.0<L>  |  2.12<H>    Ca    8.4<L>      06 Mar 2020 07:16        MEDICATIONS  (STANDING):  ATENolol  Tablet 25 milliGRAM(s) Oral two times a day  enoxaparin Injectable 30 milliGRAM(s) SubCutaneous every 24 hours  hydrALAZINE 25 milliGRAM(s) Oral two times a day  lactated ringers. 1000 milliLiter(s) (80 mL/Hr) IV Continuous <Continuous>  senna 2 Tablet(s) Oral at bedtime  sertraline 25 milliGRAM(s) Oral daily    MEDICATIONS  (PRN):  acetaminophen   Tablet .. 650 milliGRAM(s) Oral every 6 hours PRN Mild Pain (1 - 3)  ondansetron Injectable 4 milliGRAM(s) IV Push every 6 hours PRN Nausea and/or Vomiting  oxyCODONE    IR 2.5 milliGRAM(s) Oral every 4 hours PRN Moderate Pain (4 - 6)  oxyCODONE    IR 5 milliGRAM(s) Oral every 4 hours PRN Severe Pain (7 - 10)      Physical exam: The right hip dressing is clean, dry and intact. No drainage or discharge. No erythema is noted. No blistering. No ecchymosis. The calf is supple nontender. Passive range of motion is acceptable to due postoperative pain. No calf tenderness. Sensation to light touch is grossly intact distally. Motor function distally is 5/5. No foot drop. 2+ dorsalis pedis pulse. Capillary refill is less than 2 seconds. No cyanosis.    Primary Orthopedic Assessment:  • s/p RIGHT  hip hemiarthroplasty, POD # 1.     Secondary  Orthopedic Assessment(s):   •     Secondary  Medical Assessment(s):   •     Plan:   • DVT prophylaxis: Lovenox 30mg qd, use of compression devices and ankle pumps  • Continue physical therapy  • Weightbearing as tolerated of the right lower extremity with assistance of a walker  • Incentive spirometry encouraged  • Pain control as clinically indicated  • Posterior hip precautions reviewed with patient  • Discharge planning   has post op anemia, ASX. will d/w primary team and recommend transfusion  d/w Dr Renteria  continue care with primary team

## 2020-03-07 NOTE — DISCHARGE NOTE PROVIDER - PROVIDER TOKENS
PROVIDER:[TOKEN:[[id number]]] PROVIDER:[TOKEN:[[id number]]],FREE:[LAST:[Jaber],FIRST:[PMD],PHONE:[(   )    -],FAX:[(   )    -]]

## 2020-03-07 NOTE — OCCUPATIONAL THERAPY INITIAL EVALUATION ADULT - NS ASR BATHING EQUIP NEEDS
pending report of home bath setup (sponge bathe previously versus shower)  Tub bench recommended if tub.

## 2020-03-07 NOTE — SWALLOW BEDSIDE ASSESSMENT ADULT - SLP GENERAL OBSERVATIONS
Pt received OOB in chair, A&A, grossly 0x3, +significantly [place], pain 0/10 pre/post evaluation, reporting that she is "doing well" and "eating better today."

## 2020-03-07 NOTE — DISCHARGE NOTE PROVIDER - NSDCMRMEDTOKEN_GEN_ALL_CORE_FT
atenolol 25 mg oral tablet: 1 tab(s) orally 2 times a day  hydrALAZINE 25 mg oral tablet: orally 2 times a day  sertraline 25 mg oral tablet: 1.5 tab(s) orally once a day aspirin 81 mg oral delayed release tablet: 1 tab(s) orally once a day  atenolol 25 mg oral tablet: 1 tab(s) orally 2 times a day  enoxaparin 30 mg/0.3 mL injectable solution: 30 milligram(s) subcutaneously once a day   hydrALAZINE 25 mg oral tablet: 1 tab(s) orally every 8 hours  oxyCODONE 5 mg oral tablet: 0.5-1 tab(s) orally every 4 hours, As Needed -Severe Pain (7 - 10) MDD:6tabs  polyethylene glycol 3350 oral powder for reconstitution: 17 gram(s) orally once a day  senna oral tablet: 2 tab(s) orally once a day (at bedtime)  sertraline 25 mg oral tablet: 1.5 tab(s) orally once a day  timolol maleate 0.5% ophthalmic solution: 1 drop(s) to each affected eye once a day

## 2020-03-07 NOTE — DISCHARGE NOTE PROVIDER - NSDCCPCAREPLAN_GEN_ALL_CORE_FT
PRINCIPAL DISCHARGE DIAGNOSIS  Diagnosis: Hip fracture  Assessment and Plan of Treatment: Continue current medications as prescribed.  Follow up with primary doctor and Ortho.  Lovenox to complete in 4 weeks.      SECONDARY DISCHARGE DIAGNOSES  Diagnosis: HTN (hypertension)  Assessment and Plan of Treatment: Continue current medications as prescribed.  Follow up with primary doctor.    Diagnosis: Chronic kidney disease  Assessment and Plan of Treatment: Continue current medications as prescribed.  Follow up with primary doctor.

## 2020-03-07 NOTE — OCCUPATIONAL THERAPY INITIAL EVALUATION ADULT - ADDITIONAL COMMENTS
Level of assist needed prior to admission not clear as pt is questionable historian.  As per pt, she was w/c level (stand pivot transfers) and using RW only with PT.  Pt reports living alone but later states someone is always with her (family/HHA).  Pt has no steps she needs to manage - there is a ramp at back entryway.  She reports having a wheelchair, RW, grab bar by toilet - needs to be verified by family.  It is not clear if pt has tub or shower.

## 2020-03-07 NOTE — SWALLOW BEDSIDE ASSESSMENT ADULT - COMMENTS
Pt reported consistency as "dry" and requsted sip of water to "wash it down," which she did with good success

## 2020-03-07 NOTE — SWALLOW BEDSIDE ASSESSMENT ADULT - ASR SWALLOW ASPIRATION MONITOR
oral hygiene/gurgly voice/pneumonia/upper respiratory infection/change of breathing pattern/position upright (90Y)/fever/throat clearing/cough

## 2020-03-07 NOTE — PHYSICAL THERAPY INITIAL EVALUATION ADULT - ADDITIONAL COMMENTS
Patient is questionable historian, corroborate with family as needed. Patient lives in a house with ramp access and all needs met on main floor. She claims to live alone, but per RN, she lives with her daughter. She was stand-pivot to wheelchair, using RW for PT only. Has commode, shower chair, RW, wheelchair at home. She also notes HHA assist, but unable to verbalize frequency.

## 2020-03-07 NOTE — PROGRESS NOTE ADULT - SUBJECTIVE AND OBJECTIVE BOX
CHIEF COMPLAINT/INTERVAL HISTORY:    Patient is a 89y old  Female who presents with a chief complaint of Right hip fracture s/p mechanical fall (07 Mar 2020 08:39)    SUBJECTIVE & OBJECTIVE: Pt seen and examined at bedside. No overnight events. Reports pain is controlled. Drop in H/H noted; consent for blood transfusion obtained. Will transfuse 1 unit of PRBC.    ROS: No chest pain, palpitations, SOB, light headedness, dizziness, headache, nausea/vomiting, fevers/chills, abdominal pain.    ICU Vital Signs Last 24 Hrs  T(C): 36.6 (07 Mar 2020 16:25), Max: 36.7 (06 Mar 2020 19:56)  T(F): 97.8 (07 Mar 2020 16:25), Max: 98.1 (06 Mar 2020 19:56)  HR: 98 (07 Mar 2020 16:25) (54 - 98)  BP: 114/61 (07 Mar 2020 16:25) (96/55 - 147/61)  RR: 20 (07 Mar 2020 16:25) (14 - 20)  SpO2: 94% (07 Mar 2020 16:25) (94% - 96%)    MEDICATIONS  (STANDING):  ATENolol  Tablet 25 milliGRAM(s) Oral two times a day  enoxaparin Injectable 30 milliGRAM(s) SubCutaneous every 24 hours  hydrALAZINE 25 milliGRAM(s) Oral two times a day  senna 2 Tablet(s) Oral at bedtime  sertraline 25 milliGRAM(s) Oral daily    MEDICATIONS  (PRN):  acetaminophen   Tablet .. 650 milliGRAM(s) Oral every 6 hours PRN Mild Pain (1 - 3)  ondansetron Injectable 4 milliGRAM(s) IV Push every 6 hours PRN Nausea and/or Vomiting  oxyCODONE    IR 2.5 milliGRAM(s) Oral every 4 hours PRN Moderate Pain (4 - 6)  oxyCODONE    IR 5 milliGRAM(s) Oral every 4 hours PRN Severe Pain (7 - 10)      LABS:                        7.8    x     )-----------( x        ( 07 Mar 2020 14:46 )             23.9     03-07    138  |  103  |  68.0<H>  ----------------------------<  76  4.6   |  19.0<L>  |  2.32<H>    Ca    7.7<L>      07 Mar 2020 08:03  Phos  5.0     03-07  Mg     1.8     03-07    PHYSICAL EXAM:    GENERAL: elderly female, sitting in chair, not in acute distress, hard of hearing  HEAD:  Atraumatic, Normocephalic  EYES: EOMI, PERRLA, conjunctiva and sclera clear  ENMT: Moist mucous membranes  NECK: Supple   CHEST/LUNG: Clear to auscultation bilaterally   HEART: Regular rate and rhythm; + S1/S2  ABDOMEN: Soft, Nontender, Nondistended; Bowel sounds present  EXTREMITIES:  RLE edema and ecchymosis, right hip without tense hematoma

## 2020-03-07 NOTE — DISCHARGE NOTE PROVIDER - NSDCFUADDINST_GEN_ALL_CORE_FT
Ortho d/c instructions: The patient will be seen in the office between 2-3 weeks for wound check. Patient may shower after post-op day #4 (3/10). The dressing is to be removed on day # 9 (3/15). The patient will contact the office if the wound becomes red, has increasing pain, develops bleeding or discharge, an injury occurs, or has other concerns. The patient will continue PT consistent with posterior total hip replacement protocol. The patient will continue to practice posterior total hip precautions for a minimum of 6 week. The patient will continue LOVENOX for 4 weeks for DVTP. The patient will take Oxycodone for pain control and titrate according to prescription and patient needs. The patient is FULL weight bearing.

## 2020-03-07 NOTE — OCCUPATIONAL THERAPY INITIAL EVALUATION ADULT - PRECAUTIONS/LIMITATIONS, REHAB EVAL
posterior hip precautions, pt very [place] even with hearing aids/fall precautions/right hip precautions

## 2020-03-07 NOTE — PROGRESS NOTE ADULT - ASSESSMENT
Patient is a 89 y/o female with PMH of Aortic stenosis s/p TAVR, Afib not on AC due to frequent fall, CHFpEF, CKD-4, HTN, came to the ED s/p mechanical fall. Patient said she was in the bathroom holding a bar, let go and fell. Did not hit her head. Witness by family who helped her get up from the floor; however she was unable to bear weight on her right leg. She has no LOC, fever, chills, chest pain, shortness of breath, nausea, vomiting, abdominal pain, change in bowel/urinary habit, HA.     # Right femoral neck fracture  - s/p right hip hemiarthroplasty POD #1  - XRAY with post-operative changes  - analgesia PRN  - PT recommending [facility] vs home with 24/7 assist  - hold DVT ppx given worsening anemia  - ortho recommendations appreciated    # Acute on Chronic Anemia  -acute component likely due to acute blood loss  -on admission Hb 11; initial drop likely dilutional  -but Hb 7.2 today; will transfuse 1 unit of PRBC  -hold lovenox  -check iron studies prior and will consider iron if indicated    # Afib   - Not on AC due to frequent falls  - continue atenolol as ordered  - cardiology consult appreciated     # MANSI on CKD-4   - creatinine rising up; likely due to hypovolemia  - transfuse 1 unit of PRBC  - bladder scan 200 cc; serial bladder scans   - avoid nephrotoxic agents and renally dose all medicaitons  - repeat BMP in AM    #HTN   - monitor blood pressure  - continue atenolol and hydralazine    #DVT prophylaxis - SCDs Patient is a 89 y/o female with PMH of Aortic stenosis s/p TAVR, Afib not on AC due to frequent fall, CHFpEF, CKD-4, HTN, came to the ED s/p mechanical fall. Patient said she was in the bathroom holding a bar, let go and fell. Did not hit her head. Witness by family who helped her get up from the floor; however she was unable to bear weight on her right leg. She has no LOC, fever, chills, chest pain, shortness of breath, nausea, vomiting, abdominal pain, change in bowel/urinary habit, HA.     # Right femoral neck fracture  - s/p right hip hemiarthroplasty POD #1  - XRAY with post-operative changes  - analgesia PRN  - PT recommending [facility] vs home with 24/7 assist  - hold DVT ppx given worsening anemia  - ortho recommendations appreciated    # Acute on Chronic Anemia  -acute component likely due to acute blood loss  -on admission Hb 11; initial drop likely dilutional  -but Hb 7.2 today; will transfuse 1 unit of PRBC  -hold lovenox  -check iron studies prior and will consider iron if indicated    # Afib   - Not on AC due to frequent falls  - continue atenolol as ordered  - cardiology consult appreciated     # MANSI on CKD-4   - creatinine rising up; likely due to hypovolemia  - transfuse 1 unit of PRBC  - bladder scan 200 cc; serial bladder scans   - avoid nephrotoxic agents and renally dose all medicaitons  - repeat BMP in AM    #HTN   - monitor blood pressure  - continue atenolol and hydralazine    # Depression  -continue sertraline    # Dysphagia  -seen by speech pathology  -diet changed to soft with thin liquids    #DVT prophylaxis - SCDs    Dispo - home vs [facility] once medically stable. Will need PT follow up to determine ultimate dispo.

## 2020-03-07 NOTE — OCCUPATIONAL THERAPY INITIAL EVALUATION ADULT - ORIENTATION, REHAB EVAL
oriented to person, place, time and situation/Pt grossly oriented to date.  She knows year/month/day of week but not exact date.

## 2020-03-07 NOTE — PHYSICAL THERAPY INITIAL EVALUATION ADULT - GAIT DEVIATIONS NOTED, PT EVAL
decreased weight-shifting ability/decreased david/decreased step length/decreased stride length/shuffling

## 2020-03-07 NOTE — SWALLOW BEDSIDE ASSESSMENT ADULT - SLP PERTINENT HISTORY OF CURRENT PROBLEM
"89 y/o female with PMH of Aortic stenosis s/p TAVR, Afib not on AC due to frequent fall, CHFpEF, CKD-4, HTN, came to the ED s/p mechanical fall with right hip fracture, s/p right hip hemiarthroplasty, POD # 1. Pt reports that she occasionally feels food getting stuck in mid esophageal region, noting that it was "worse" yesterday, but better today.

## 2020-03-08 NOTE — PROGRESS NOTE ADULT - SUBJECTIVE AND OBJECTIVE BOX
Patient seen and examined at bedside. Comfortable in bed, pain controlled. Denies fever/chills, SOB/chest pain, abdominal pain, numbness/tingling. no complaints.    Vital Signs Last 24 Hrs  T(C): 36.4 (08 Mar 2020 08:18), Max: 36.6 (07 Mar 2020 16:25)  T(F): 97.6 (08 Mar 2020 08:18), Max: 97.8 (07 Mar 2020 16:25)  HR: 68 (08 Mar 2020 08:18) (68 - 105)  BP: 113/40 (08 Mar 2020 08:18) (109/50 - 166/48)  BP(mean): --  RR: 18 (08 Mar 2020 08:18) (18 - 20)  SpO2: 97% (08 Mar 2020 08:18) (94% - 99%)    RLE: Dressing C/D/I. Removed, prineo intact. no erythema, no active drainage. SILT. + dorsi/plantarflexion. DP 2+. Calf soft NT B/L.                          8.2    7.01  )-----------( 154      ( 08 Mar 2020 07:37 )             24.7     A/P: 89 y.o F s/p right hip serge POD #2  - WBAT, posterior hip precautions  - dressing changed  - DVTP  - cont care primary team

## 2020-03-08 NOTE — PROGRESS NOTE ADULT - ASSESSMENT
Patient is a 89 y/o female with PMH of Aortic stenosis s/p TAVR, Afib not on AC due to frequent fall, CHFpEF, CKD-4, HTN, came to the ED s/p mechanical fall. Patient said she was in the bathroom holding a bar, let go and fell. Did not hit her head. Witness by family who helped her get up from the floor; however she was unable to bear weight on her right leg. She has no LOC, fever, chills, chest pain, shortness of breath, nausea, vomiting, abdominal pain, change in bowel/urinary habit, HA. Found with displaced R femur fracture , s/p R hemiarthroplasty , POD # 2 . Postop complicated with acute blood lost anemia due to surgical blood lost , likely on chronic anemia , 1 unit PRBC given , also noted with worsening of renal function , patient is [place] , as per nursing poor oral intake .     # Right femoral neck fracture  - s/p right hip hemiarthroplasty POD #2  - XRAY with post-operative changes  - analgesia PRN  - PT recommending [facility] vs home with 24/7 assist  - dvt prophylaxis - will give dose of Lovenox , follow cbc in am   - ortho recommendations appreciated    # Acute on Chronic Anemia  -acute component likely due to acute blood loss  -on admission Hb 11; initial drop likely dilutional  -but Hb 7.2  yesterday, s/p   1 unit of PRBC      # Afib   - Not on AC due to frequent falls  - continue atenolol as ordered  - cardiology consult appreciated     # MANSI on CKD-4   - creatinine rising up; likely due to hypovolemia  - transfuse 1 unit of PRBC  - bladder scan 200 cc; serial bladder scans done , today's PVR 95 ML   - avoid nephrotoxic agents and renally dose all medicaitons  - poor oral intake , will cautiously hydrate , follow BMP in am     #HTN   - monitor blood pressure  - continue atenolol and hydralazine    # Depression  -continue sertraline    # Dysphagia  -seen by speech pathology  -diet changed to soft with thin liquids    #DVT prophylaxis - SCDs , will give dose of Lovenox , renal dose , follow cbc     #Hypoxia noted , on O2 3 lit - patient without sob , cp , asked nurse to check O2 sat on RA ,   USE IS   # Hx of non obstructive CAD     DVT prophylaxis  - Lovenox dose  Opioid induced constipation  prophylaxis - bowel regimen     Dispo - home vs [facility] once medically stable. Will need PT follow up to determine ultimate dispo.\  Daughter ( Mrs Beto Evans ) called , left message , awaiting for call back .

## 2020-03-08 NOTE — PROGRESS NOTE ADULT - SUBJECTIVE AND OBJECTIVE BOX
Patient seen and examined . Sitting on the chair , [place] , AAOX 3 , s/p R hemiarthroplasty , POD # 2 ,  c/o feeling tiered and c/o R hip pain . As per nursing patient with poor fluid intake , voiding . On O2 3lit , no sob , no chest pain .     CC : Feels tiered , R hip pain + , poor oral intake     HPI:  89 y/o female with PMH of Aortic stenosis s/p TAVR, Afib not on AC due to frequent fall, CHFpEF, CKD-4, HTN, came to the ED s/p mechanical fall. Patient said she was in the bathroom holding a bar, let go and fell. Did not hit her head. Witness by family who help her get up from the floor but she was unable to bear weight on her right leg. She has no LOC, fever, chills, chest pain, shortness of breath, nausea, vomiting, abdominal pain, change in bowel/urinary habit, HA. (05 Mar 2020 04:12)      PAST MEDICAL & SURGICAL HISTORY:  CKD (chronic kidney disease) stage 4, GFR 15-29 ml/min  Urine frequency  NSTEMI (non-ST elevated myocardial infarction)  Atrial fibrillation  HTN (hypertension)  Orthopnea  CLIFTON (dyspnea on exertion)  CHF (congestive heart failure)  Bruises easily  Aortic stenosis  Anxiety  Status post transcatheter aortic valve replacement (TAVR) using bioprosthesis  H/O abdominal hysterectomy  History of cholecystectomy      MEDICATIONS  (STANDING):  ATENolol  Tablet 25 milliGRAM(s) Oral two times a day  enoxaparin Injectable 30 milliGRAM(s) SubCutaneous once  hydrALAZINE 25 milliGRAM(s) Oral two times a day  senna 2 Tablet(s) Oral at bedtime  sertraline 25 milliGRAM(s) Oral daily  sodium chloride 0.9%. 1000 milliLiter(s) (75 mL/Hr) IV Continuous <Continuous>    MEDICATIONS  (PRN):  acetaminophen   Tablet .. 650 milliGRAM(s) Oral every 6 hours PRN Mild Pain (1 - 3)  ondansetron Injectable 4 milliGRAM(s) IV Push every 6 hours PRN Nausea and/or Vomiting  oxyCODONE    IR 2.5 milliGRAM(s) Oral every 4 hours PRN Moderate Pain (4 - 6)  oxyCODONE    IR 5 milliGRAM(s) Oral every 4 hours PRN Severe Pain (7 - 10)      LABS:                          8.2    7.01  )-----------( 154      ( 08 Mar 2020 07:37 )             24.7     03-08    133<L>  |  99  |  74.0<H>  ----------------------------<  95  5.1   |  18.0<L>  |  2.27<H>    Ca    7.9<L>      08 Mar 2020 07:37  Phos  3.8     03-08  Mg     1.9     03-08      RADIOLOGY & ADDITIONAL TESTS:      < from: CT Head No Cont (03.05.20 @ 01:26) >   EXAM:  CT BRAIN                          PROCEDURE DATE:  03/05/2020          INTERPRETATION:  CLINICAL INFORMATION: Status post fall.    COMPARISON: CT head of 1/11/2019.    PROCEDURE:   Noncontrast CT of the Head was performed from the skull base to the vertex. Coronal and Sagittal reformats were obtained.    FINDINGS:    There is no acute intracranial hemorrhage, mass effect, midline shift, extra-axial collection, hydrocephalus, or evidence of acute vascular territorial infarction. Moderatepatchy hypodensities within the periventricular and subcortical white matter, although nonspecific, likely reflect chronic microvascular disease.    Small air-fluid level within the left maxillary sinus. Opacified right frontal sinus. Clear mastoid air cells. No calvarial fracture.    IMPRESSION:     No acute intracranial hemorrhage or calvarial fracture.    Nonspecific trace air-fluid level within the left maxillary sinus and opacification of the right frontal sinus. Correlate for sinusitis.    NINA ZHAO M.D., ATTENDING RADIOLOGIST  This document has been electronically signed. Mar  5 2020  1:32AM              < end of copied text >      < from: Xray Chest 1 View AP/PA. (03.05.20 @ 00:51) >     EXAM:  XR CHEST AP OR PA 1V                          PROCEDURE DATE:  03/05/2020          INTERPRETATION:  Portable chest radiograph        CLINICAL INFORMATION:   Admission chest radiograph    TECHNIQUE:  Portable  AP view of the chest was obtained.    COMPARISON: 2/24/2018, 3/27/2017 chest radiograph available for review.    FINDINGS:   The lungs  are clear.  No pleural abnormality is seen.    The heart. Status post cardiac valve replacement  and mediastinum are within normal limits.    Visualized osseous structures are intact.        IMPRESSION:   No interval change..    NINA HURTADO M.D., ATTENDING RADIOLOGIST  This document has been electronically signed. Mar  5 2020  7:23AM        < end of copied text >      < from: Xray Hip w/ Pelvis 1 View, Right (03.06.20 @ 15:14) >     EXAM:  XR HIP WITH PELV 1V RT                          PROCEDURE DATE:  03/06/2020          INTERPRETATION:  CLINICAL STATEMENT: Post Op.    TECHNIQUE: AP views of the pelvis.    COMPARISON: None.    FINDINGS:  Total  right replacement noted in gross anatomic alignment with post operative changes.     IMPRESSION:  Post operative changes.       CLARA JOSHUA M.D., ATTENDING RADIOLOGIST  This document has been electronically signed. Mar  6 2020  3:59PM              < end of copied text >    < from: Xray Hip w/ Pelvis 2 or 3 Views, Right (03.04.20 @ 23:49) >     EXAM:  XR HIP WITH PELV 2-3V RT                          PROCEDURE DATE:  03/04/2020          INTERPRETATION:  Radiographs of the RIGHT hip         CLINICAL INFORMATION:  Injury with   Pain.    TECHNIQUE:   AP and oblique views of the hip were obtained. AP pelvis    FINDINGS:   No prior exams are available for comparison.    RIGHT hip subcapital fracture with superior displacement of proximal fracture segment. An  Remaining osseous pelvis and LEFT hip intact    No soft tissue abnormality is recognized. [Osseous pelvis intact.]    IMPRESSION:    RIGHT hip displaced subcapital fracture deformity.      NINA HURTADO M.D., ATTENDING RADIOLOGIST  This document has been electronically signed. Mar  5 2020  7:12AM          < end of copied text >  < from: 12 Lead ECG (03.05.20 @ 01:11) >    Ventricular Rate 71 BPM    Atrial Rate 71 BPM    P-R Interval 202 ms    QRS Duration 98 ms    Q-T Interval 422 ms    QTC Calculation(Bezet) 458 ms    P Axis 89 degrees    R Axis -23 degrees    T Axis 73 degrees    Diagnosis Line Normal sinus rhythm  Minimal voltage criteria for LVH, may be normal variant  Anterior infarct , age undetermined  T wave abnormality, consider lateral ischemia  Abnormal ECG    Confirmed by BARBARA MEDINA ([id number]) on 3/6/2020 10:42:11 AM    < end of copied text >          REVIEW OF SYSTEMS:    As above , all other systems reviewed and are negative .     Vital Signs Last 24 Hrs  T(C): 36.4 (08 Mar 2020 08:18), Max: 36.6 (07 Mar 2020 16:25)  T(F): 97.6 (08 Mar 2020 08:18), Max: 97.8 (07 Mar 2020 16:25)  HR: 68 (08 Mar 2020 08:18) (68 - 105)  BP: 113/40 (08 Mar 2020 08:18) (109/50 - 166/48)  BP(mean): --  RR: 18 (08 Mar 2020 08:18) (18 - 20)  SpO2: 97% (08 Mar 2020 08:18) (94% - 99%)    PHYSICAL EXAM:    GENERAL: NAD, well-groomed, well-developed  HEAD:  Atraumatic, Normocephalic  EYES: EOMI, PERRLA, conjunctiva and sclera clear  NECK: Supple, No JVD, Normal thyroid  NERVOUS SYSTEM:  Alert & Oriented X3, no focal deficit  CHEST/LUNG: CTA b/l ,  no  rales, rhonchi, wheezing, or rubs  HEART: Regular rate and rhythm; systolic  murmurs III/VI , no rubs, or gallops  ABDOMEN: Soft, Nontender, Nondistended; Bowel sounds present  EXTREMITIES:  2+ Peripheral Pulses, No clubbing, cyanosis, or edema , R hip dressing + , clean and dry   LYMPH: No lymphadenopathy noted  SKIN: No rashes or lesions

## 2020-03-09 NOTE — PROGRESS NOTE ADULT - REASON FOR ADMISSION
Right hip fracture s/p mechanical fall

## 2020-03-09 NOTE — PROGRESS NOTE ADULT - SUBJECTIVE AND OBJECTIVE BOX
· Subjective and Objective: s/p Right hip hemiarthroplasty pod #3  Patient seen and examined at bedside. Comfortable in bed, pain controlled. Denies fever/chills, SOB/chest pain, abdominal pain, numbness/tingling. no complaints.      Vital Signs Last 24 Hrs  T(C): 36.4 (08 Mar 2020 08:18), Max: 36.6 (07 Mar 2020 16:25)  T(F): 97.6 (08 Mar 2020 08:18), Max: 97.8 (07 Mar 2020 16:25)  HR: 68 (08 Mar 2020 08:18) (68 - 105)  BP: 113/40 (08 Mar 2020 08:18) (109/50 - 166/48)  BP(mean): --  RR: 18 (08 Mar 2020 08:18) (18 - 20)  SpO2: 97% (08 Mar 2020 08:18) (94% - 99%)    RLE: Dressing C/D/I. no erythema, no active drainage. SILT. + dorsi/plantarflexion. DP 2+. Calf soft NT B/L.                                                8.2    7.41  )-----------( 174      ( 09 Mar 2020 06:19 )             24.5       A/P: 89 y.o F s/p right hip serge POD #2  - WBAT, posterior hip precautions  - DVTP  - cont care primary team

## 2020-03-09 NOTE — PROGRESS NOTE ADULT - SUBJECTIVE AND OBJECTIVE BOX
Patient seen and examined . S/p R hip hemiarthroplasty   , POD # 3 . Mcgrath , states pain well controlled , voiding without difficulties , oral intake poor ,   states doesn't remember when did she had last time BM     CC : R hip pain well controlled , unknown last BM     OVERNIGHT EVENTS : Hypertensive episode overnight , asymptomatic ,   noted to  desaturate over night down to 87% while asleep , this am saturating 96% on RA .     MEDICATIONS  (STANDING):  ATENolol  Tablet 25 milliGRAM(s) Oral two times a day  enoxaparin Injectable 30 milliGRAM(s) SubCutaneous at bedtime  hydrALAZINE 25 milliGRAM(s) Oral every 8 hours  polyethylene glycol 3350 17 Gram(s) Oral once  senna 2 Tablet(s) Oral at bedtime  sertraline 25 milliGRAM(s) Oral daily  sodium chloride 0.9%. 1000 milliLiter(s) (50 mL/Hr) IV Continuous <Continuous>    MEDICATIONS  (PRN):  acetaminophen   Tablet .. 650 milliGRAM(s) Oral every 6 hours PRN Mild Pain (1 - 3)  magnesium hydroxide Suspension 30 milliLiter(s) Oral once PRN Constipation  magnesium hydroxide Suspension 30 milliLiter(s) Oral daily PRN Constipation  ondansetron Injectable 4 milliGRAM(s) IV Push every 6 hours PRN Nausea and/or Vomiting  oxyCODONE    IR 2.5 milliGRAM(s) Oral every 4 hours PRN Moderate Pain (4 - 6)  oxyCODONE    IR 5 milliGRAM(s) Oral every 4 hours PRN Severe Pain (7 - 10)      LABS:                          8.2    7.41  )-----------( 174      ( 09 Mar 2020 06:19 )             24.5     03-09    133<L>  |  100  |  72.0<H>  ----------------------------<  95  4.2   |  17.0<L>  |  1.99<H>    Ca    7.8<L>      09 Mar 2020 06:19  Phos  3.8     03-08  Mg     1.9     03-08    RADIOLOGY & ADDITIONAL TESTS:  < from: Xray Hip w/ Pelvis 1 View, Right (03.06.20 @ 15:14) >   EXAM:  XR HIP WITH PELV 1V RT                          PROCEDURE DATE:  03/06/2020          INTERPRETATION:  CLINICAL STATEMENT: Post Op.    TECHNIQUE: AP views of the pelvis.    COMPARISON: None.    FINDINGS:  Total  right replacement noted in gross anatomic alignment with post operative changes.     IMPRESSION:  Post operative changes.       CLARA JOSHUA M.D., ATTENDING RADIOLOGIST  This document has been electronically signed. Mar  6 2020  3:59PM        < end of copied text >            REVIEW OF SYSTEMS:    Mcgrath , R hip pain well controlled , not sure when did she had last BM , thinks it may be few days ago , all other systems are reviewed and are negative .     Vital Signs Last 24 Hrs  T(C): 36.6 (09 Mar 2020 08:43), Max: 36.7 (08 Mar 2020 15:36)  T(F): 97.9 (09 Mar 2020 08:43), Max: 98 (08 Mar 2020 15:36)  HR: 61 (09 Mar 2020 08:43) (61 - 84)  BP: 139/88 (09 Mar 2020 08:43) (139/88 - 202/62)  BP(mean): --  RR: 18 (09 Mar 2020 08:43) (18 - 19)  SpO2: 97% (09 Mar 2020 08:43) (90% - 98%)    PHYSICAL EXAM:    GENERAL: NAD, well-groomed, well-developed  HEAD:  Atraumatic, Normocephalic  EYES: EOMI, PERRLA, conjunctiva and sclera clear  NECK: Supple, No JVD, Normal thyroid  NERVOUS SYSTEM:  Alert & Oriented X3, no focal deficit  CHEST/LUNG: CTA b/l ,  no  rales, rhonchi, wheezing, or rubs  HEART: Regular rate and rhythm; No murmurs, rubs, or gallops  ABDOMEN: Soft, Nontender, Nondistended; Bowel sounds present  EXTREMITIES:  2+ Peripheral Pulses, No clubbing, cyanosis, or edema , R hip dressing + , clean and dry , b/l LE chronic skin changes   LYMPH: No lymphadenopathy noted  SKIN: No rashes or lesions

## 2020-03-09 NOTE — PROGRESS NOTE ADULT - ASSESSMENT
Patient is a 89 y/o female with PMH of Aortic stenosis s/p TAVR, Afib not on AC due to frequent fall, CHFpEF, CKD-4, HTN, came to the ED s/p mechanical fall. Patient said she was in the bathroom holding a bar, let go and fell. Did not hit her head. Witness by family who helped her get up from the floor; however she was unable to bear weight on her right leg. She has no LOC, fever, chills, chest pain, shortness of breath, nausea, vomiting, abdominal pain, change in bowel/urinary habit, HA. Found with displaced R femur fracture , s/p R hemiarthroplasty , POD # 3 . Postop complicated with acute blood lost anemia due to surgical blood lost , likely on chronic anemia , 1 unit PRBC POD # 1 , also noted with worsening of renal function , given ivf ,  patient is [place] , as per nursing poor oral intake .     # Right femoral neck fracture after fall   - s/p right hip hemiarthroplasty POD #2  - XRAY with post-operative changes  - analgesia PRN  - PT recommending JEREL vs home with 24/7 assist  - dvt prophylaxis - restarted on Lovenox   - ortho recommendations appreciated    # Acute on Chronic Anemia  -acute component likely due to acute blood loss  -on admission Hb 11; initial drop likely dilutional  -but Hb 7.2  POD #1 , s/p   1 unit of PRBC  H/H stable , patient is asymptomatic       # Afib   - Not on AC due to frequent falls  - continue atenolol as ordered  - cardiology consult appreciated     # MANSI on CKD-4   - creatinine was rising up; likely due to hypovolemia  - s/p   1 unit of PRBC  - avoid nephrotoxic agents and renally dose all medications  - poor oral intake , will cautiously hydrate  - Cr improving     #HTN   - Episode of High SBP ( 180 ) overnight , clonidine dose given   - monitor blood pressure  - continue atenolol and hydralazine ( increased to TID )     # Depression  -continue sertraline    # Dysphagia  -seen by speech pathology  -diet changed to soft with thin liquids    #DVT prophylaxis - SCDs , continue renal dose of Lovenox     #Hypoxia noted , on O2 3 lit - patient without sob , cp , asked nurse to check O2 sat on RA ,   Overnight desaturates down to 87% - asymptomatic , presumed RITIKA , will need sleep study -  may be done as on outpatient , this am saturating well on RA - 96% .   USE IS   # Hx of non obstructive CAD     DVT prophylaxis  - Lovenox   Opioid induced constipation  prophylaxis - bowel regimen , not sure when was last BM , will add Miralax , give MOM .     Dispo - home vs JEREL ,awaiting for physical therapy input .    Medically stable to d/c once cleared by physical therapy. stable once bed available .

## 2020-03-10 NOTE — DISCHARGE NOTE NURSING/CASE MANAGEMENT/SOCIAL WORK - PATIENT PORTAL LINK FT
You can access the FollowMyHealth Patient Portal offered by [facility] by registering at the following website: [email or web address]. By joining [employer]’s FollowMyHealth portal, you will also be able to view your health information using other applications (apps) compatible with our system.

## 2020-11-02 NOTE — OCCUPATIONAL THERAPY INITIAL EVALUATION ADULT - NS ASR OT EQUIP NEEDS DISCH
Informed pt of positive covid test. AVS and CDC recommendations reviewed. Pt given callback number and has CDC website on AVS. Pt verbalized understanding.  
bathing/dressing/toileting/rolling walker (5 inch wheels)/wheelchair/Pt already has wheelchair, RW, grab bar by toilet.

## 2021-01-28 NOTE — PROGRESS NOTE ADULT - SUBJECTIVE AND OBJECTIVE BOX
Addended by: Kayce De La Cruz on: 1/28/2021 01:16 PM     Modules accepted: Orders Renal :    Subjective: Pt. lying in bed, denies any SOB or chest pain,     Friend @ Bedside,    NPO - CT w. No IVC - pending,    Tele: Sinus rhythm with brief episodes of A-fib overnight.    Vital Signs Last 24 Hrs  T(C): 36.8, Max: 36.8 ( @ 10:00)  T(F): 98.3, Max: 98.3 ( @ 10:00)  HR: 69 (69 - 100)  BP: 106/60 (98/64 - 124/68)  RR: 20 (16 - 20)  SpO2: 100% (98% - 100%)                    LV EF:65%    21 Mar 2017 05:00    140    |  103    |  79.0   ----------------------------<  96     4.0     |  24.0   |  2.18     Ca    8.5        21 Mar 2017 05:00  Mg     2.2       21 Mar 2017 05:00    TPro  7.7    /  Alb  3.9    /  TBili  0.5    /  DBili  x      /  AST  25     /  ALT  16     /  AlkPhos  48     20 Mar 2017 07:26                             9.7    7.2   )-----------( 162      ( 21 Mar 2017 04:57 )             29.2       PT/INR - ( 20 Mar 2017 07:26 )   PT: 11.9 sec;   INR: 1.08 ratio         PTT - ( 20 Mar 2017 07:26 )  PTT:26.6 sec       CXR: PROCEDURE DATE:  2017        INTERPRETATION:      HISTORY:    Shortness of breath with severe aortic  stenosis ,  TECHNIQUE: Portable frontal view of the chest, 1 view.  COMPARISON: 2017.  FINDINGS:     HEART: difficult to access in this projection  LUNGS: free of consolidation or effusion.    OSSEOUS STRUCTURES:: degenerative changes    IMPRESSION:   No infiltrates.    I&O's Detail  I & Os for 24h ending 21 Mar 2017 07:00  =============================================  IN:    Oral Fluid: 120 ml    Total IN: 120 ml  ---------------------------------------------  OUT:    Total OUT: 0 ml  ---------------------------------------------  Total NET: 120 ml    I & Os for current day (as of 21 Mar 2017 17:55)  =============================================  IN:    Total IN: 0 ml  ---------------------------------------------  OUT:    Voided: 1000 ml    Total OUT: 1000 ml  ---------------------------------------------  Total NET: -1000 ml      BOWEL MOVEMENT:  [ ] YES [x] NO      MEDICATIONS  (STANDING):  sodium chloride 0.9% lock flush 3milliLiter(s) IV Push every 8 hours  aspirin enteric coated 81milliGRAM(s) Oral daily  calcium carbonate 500 mG (Tums) Chewable 1Tablet(s) Chew daily  clopidogrel Tablet 75milliGRAM(s) Oral daily  ATENolol  Tablet 50milliGRAM(s) Oral every 12 hours  furosemide    Tablet 20milliGRAM(s) Oral daily  timolol 0.5% Solution 1Drop(s) Left EYE two times a day  multivitamin 1Tablet(s) Oral daily  ascorbic acid 500milliGRAM(s) Oral daily  epoetin audelia Injectable 6000Unit(s) SubCutaneous <User Schedule>    MEDICATIONS  (PRN):  ALPRAZolam 0.125milliGRAM(s) Oral at bedtime PRN insomnia/anxiety      Physical Exam:  Neuro: A&Ox4, no focal deficits noted. Right eye with sub conjunctival  hemorrhage & suborbital ecchymosis noted.  Pulm: Clear bilaterally.  CV: RRR, +S1, +S2, III/VI systolic murmur  Abd: soft, non-tender, non-distended, +BS, +BM 3/19/17.  Extremities: MAEx4, no edema noted, +PP, - calf tenderness.    PAST MEDICAL & SURGICAL HISTORY:  CKD (chronic kidney disease) stage 4, GFR 15-29 ml/min  Urine frequency  NSTEMI (non-ST elevated myocardial infarction)  Atrial fibrillation  HTN (hypertension)  Orthopnea  CLIFTON (dyspnea on exertion)  CHF (congestive heart failure)  Bruises easily  Aortic stenosis  Anxiety  H/O abdominal hysterectomy  History of cholecystectomy        EXAM:  ECHO TRANSTHORACIC COMP W DOPP      PROCEDURE DATE:  Mar  8 2017   .      INTERPRETATION:  REPORT:    TRANSTHORACIC ECHOCARDIOGRAM REPORT           Patient Name:   GARETH ANDERSEN Patient Location: Outpatient  Medical Rec #:  [id number]    Accession #:      [id number]  Account #:                        Height:           60.0 in 152.4 cm  YOB: 1918         Weight:           114.0 lb 51.71 kg  Patient Age:    89 years          BSA:              1.47 m²  Patient Gender: F                 BP:               120/64 mmHg        Date of Exam:        3/8/2017 11:13:32 AM  Sonographer:         Elisa Moraes  Referring Physician: Guillermo Anne MD     Procedure:   2D Echo/Doppler/Color Doppler Complete.  Indications: Nonrheumatic aortic (valve) stenosis - I35.0  Diagnosis:   Nonrheumatic aortic (valve) stenosis with insufficiency -   I35.2           2D AND M-MODE MEASUREMENTS (normal ranges within parentheses):  Left                 Normal   Aorta/Left            Normal  Ventricle:                    Atrium:  IVSd (Mmode): 1.47  (0.7-1.1) Aortic Root  3.39 cm (2.4-3.7)                 cm             (Mmode):  LVPWd         1.51  (0.7-1.1) LA Volume     78.0  (Mmode):       cm             Index         ml/m²  LVIDd         4.37  (3.4-5.7)  (Mmode):       cm  LVIDs         2.33  (Mmode):       cm  LV FS         46.7   (>25%)  (Mmode):        %  Rel. Wall     0.69   (<0.42)  Thickness Mm  LV Mass       178.1  Index: Mmode  g/m²     LV DIASTOLIC FUNCTION:  MV Peak E: 0.92 m/s e', MV Sarah: 0.03 m/s  MV Peak A: 1.35 m/s E/e' Ratio: 30.67  E/A Ratio: 0.68     SPECTRAL DOPPLER ANALYSIS (where applicable):  Aortic Valve: AoV Max Omar: 5.35 m/s AoV Peak P.6 mmHg AoV Mean P.8 mmHg     LVOT Vmax: 0.66 m/s LVOT VTI: 0.218 m LVOT Diameter: 2.00 cm     AoV Area, Vmax: 0.39 cm² AoV Area, VTI: 0.40 cm² AoV Area, Vmn: 0.38 cm²  Ao VTI: 1.698  Aortic Insufficiency:  AI Half-time: 384 msec     Tricuspid Valve and PA/RV Systolic Pressure: TR Max Velocity: 3.65 m/s   RA Pressure: 8 mmHg RVSP/PASP: 61.3 mmHg        PHYSICIAN INTERPRETATION:  Left Ventricle: The left ventricular internal cavity size is normal. Left   ventricular wall thickness is moderately increased. There is moderate   concentric left ventricular hypertrophy.  Global LV systolic function was normal. Left ventricular ejection   fraction, by visual estimation, is 65%. Elevated left atrial and left   ventricular end-diastolic pressures.  Right Ventricle: Normal right ventricular size andfunction.  Left Atrium: Severely enlarged left atrium.  Right Atrium: The right atrium is normal in size.  Pericardium: Trivial pericardial effusion is present. The pericardial   effusion is globally located around the entire heart.  Mitral Valve: Thickening and calcification of the posterior mitral valve   leaflet. There is mild mitral annular calcification. Moderate mitral   valve regurgitation is seen.  Tricuspid Valve: The tricuspid valve is normal in structure. Mild   tricuspid regurgitationis visualized. Estimated pulmonary artery   systolic pressure is 61.3 mmHg assuming a right atrial pressure of 8   mmHg, which is consistent with severe pulmonary hypertension.  Aortic Valve: The aortic valve is trileaflet. Peak transaortic gradient   equals 114.6 mmHg, mean transaortic gradient equals 70.8 mmHg, the   calculated aortic valve area equals 0.40 cm² by the continuity equation   consistent with severe aortic stenosis. The peak aortic velocity was   obtained from the apical view. Mildaortic valve regurgitation is seen.   Aortic valve area = 0.4 cmsq. Critical aortic stenosis. Mild aortic   regurgitation. Annulus: Mean diameter: 2.6 cm. Area: 5.11 cmsq.   Cimcumference=8.3 cm.  Suggest, 26 mm Olson, and 29 mm Core valve.  Pulmonic Valve: Structurally normal pulmonic valve, with normal leaflet   excursion. Trace pulmonic valve regurgitation.  Aorta: The aortic root is normal in size and structure.  Pulmonary Artery: The main pulmonary artery is normal in size.  Venous: The inferior vena cava was normal sized, with respiratory size   variation less than 50%.        Summary:   1. Severely enlarged left atrium.   2. There is moderate concentric left ventricular hypertrophy.   3. Normal wall motion. Left ventricular ejection fraction, by visual   estimation, is 65%. Grade II diastolic dysfunction.   4. Elevated left atrial and left ventricular end-diastolic pressures.   (LAP = 34 mm hg)   5. The right atrium is normal in size.   6. Normal right ventricular size and function.   7. Moderate mitral valve regurgitation.   8. Aortic valve area = 0.4 cmsq. Critical aortic stenosis. Mild aortic   regurgitation. Annulus: Mean diameter: 2.6 cm. Area: 5.11 cmsq.   Cimcumference=8.3 cm.      Suggest, 26 mm Olson, and 29 mm Core valve.   9. Estimated pulmonary artery systolic pressure is 61.3 mmHg -severe   pulmonary hypertension.  10. Trivial pericardial effusion.

## 2021-06-05 NOTE — ED STATDOCS - ATTESTATION, MLM
no
I have reviewed and confirmed nurses' notes for patient's medications, allergies, medical history, and surgical history.

## 2021-07-16 NOTE — PATIENT PROFILE ADULT - BILL PAYMENT
The patient is a 41y Male with pmhx of DM2 and umbilical hernia complaining of RLQ abdominal pain for 1 week. Began abruptly last week. Quality is sharp, has been constant for the last week. Not made worse by PO intake. Had umbilical hernia repair in Sept 2020 with uncomplicated post-op course. No fevers, n/v/d, urinary symptoms. Last BM was this morning. Didn't take any pain meds at home. Still has appendix. Denies any testicular or penile pain.
no

## 2021-07-22 NOTE — ED PROVIDER NOTE - CARDIOVASCULAR NEGATIVE STATEMENT, MLM
[facility]  Urgent Care Encounter      CHIEF COMPLAINT       Chief Complaint   Patient presents with    Headache       Nurses Notes reviewed and I agree except as noted in the HPI. HISTORY OFPRESENT ILLNESS   Prasanna Carrasco is a 36 y.o. The history is provided by the patient. No  was used. Headache  Pain location:  Frontal  Radiates to:  Eyes  Severity currently:  8/10  Severity at highest:  10/10  Onset quality:  Gradual  Duration:  1 day  Timing:  Constant  Progression:  Unchanged  Chronicity:  Chronic  Similar to prior headaches: yes    Context: activity and bright light    Context: not caffeine, not coughing, not defecating, not eating, not stress, not exposure to cold air, not intercourse, not loud noise and not straining    Relieved by:  Nothing  Worsened by: Activity, light, neck movement and sound  Ineffective treatments:  Resting in a darkened room and prescription medications  Associated symptoms: eye pain, fatigue, nausea, numbness (chronic) and photophobia    Associated symptoms: no abdominal pain, no back pain, no blurred vision, no congestion, no cough, no diarrhea, no dizziness, no drainage, no ear pain, no facial pain, no fever, no focal weakness, no hearing loss, no loss of balance, no myalgias, no near-syncope, no neck pain, no neck stiffness, no paresthesias, no seizures, no sinus pressure, no sore throat, no swollen glands, no syncope, no tingling, no URI, no visual change, no vomiting and no weakness    Risk factors: no anger, no family hx of SAH, does not have insomnia and lifestyle not sedentary        REVIEW OF SYSTEMS     Review of Systems   Constitutional: Positive for fatigue. Negative for activity change, appetite change, chills, diaphoresis and fever. HENT: Negative for congestion, ear pain, hearing loss, postnasal drip, sinus pressure and sore throat. Eyes: Positive for photophobia and pain. Negative for blurred vision. Respiratory: Negative for apnea, cough, choking, chest tightness, shortness of breath, wheezing and stridor. Cardiovascular: Negative for chest pain, palpitations, leg swelling, syncope and near-syncope. Gastrointestinal: Positive for nausea. Negative for abdominal pain, diarrhea and vomiting. Musculoskeletal: Negative for back pain, myalgias, neck pain and neck stiffness. Neurological: Positive for numbness (chronic) and headaches. Negative for dizziness, focal weakness, seizures, weakness, paresthesias and loss of balance. Psychiatric/Behavioral: Positive for sleep disturbance. PAST MEDICAL HISTORY         Diagnosis Date    Anxiety     Asthma     COPD (chronic obstructive pulmonary disease) ([facility]     Depression     Migraines        SURGICAL HISTORY     Patient  has a past surgical history that includes Tonsillectomy and adenoidectomy (1989); Ovary removal (2000); Cholecystectomy (2003); Tubal ligation (2005); Hysterectomy (2011); Dilation and curettage of uterus (2009); IGS Endo Sinus Sx (07/12/2017); and Tooth Extraction. CURRENT MEDICATIONS       Previous Medications    ALBUTEROL (PROVENTIL) (2.5 MG/3ML) 0.083% NEBULIZER SOLUTION    Take 3 mLs by nebulization every 6 hours as needed for Wheezing    ALBUTEROL SULFATE HFA (VENTOLIN HFA) 108 (90 BASE) MCG/ACT INHALER    Inhale 1-2 puffs into the lungs every 6 hours as needed for Wheezing    AMITRIPTYLINE (ELAVIL) 50 MG TABLET    Take 1 tablet by mouth 2 times daily    CLONAZEPAM (KLONOPIN) 1 MG TABLET    Take 1 tablet by mouth nightly as needed for Anxiety for up to 30 days. DIVALPROEX (DEPAKOTE ER) 500 MG EXTENDED RELEASE TABLET    TAKE 1 TABLET BY MOUTH ONCE BID    GABAPENTIN (NEURONTIN) 800 MG TABLET    Take 1 tablet by mouth 4 times daily for 180 days. KETOROLAC (TORADOL) 10 MG TABLET    Take 1 tablet by mouth every 8 hours as needed for Pain    MEDICAL MARIJUANA    Take by mouth as needed.     MULTIPLE VITAMINS-CALCIUM (ONE-A-DAY WOMENS PO)    Take by mouth daily     OXYBUTYNIN (DITROPAN-XL) 10 MG EXTENDED RELEASE TABLET    Take 1 tablet by mouth daily    QUETIAPINE (SEROQUEL) 200 MG TABLET    Take 1 tablet by mouth nightly    RESPIRATORY THERAPY SUPPLIES (NEBULIZER COMPRESSOR) KIT    1 kit by Does not apply route once for 1 dose    SALINE NASAL GEL (AYR) GEL    by Nasal route daily as needed for Congestion    SYMBICORT 80-4.5 MCG/ACT AERO    INHALE 2 PUFFS BY MOUTH TWICE DAILY, RINSE MOUTH AFTER USE    VILAZODONE HCL (VILAZODONE HCL) 10 MG TABS    Take 1 tablet by mouth once daily       ALLERGIES     Patient is is allergic to botox [onabotulinumtoxina (cosmetic)], other, topamax [topiramate], acetaminophen, aspirin, and ibuprofen. FAMILY HISTORY     Patient's family history includes Cancer in her father; Depression in her father and mother; Heart Disease in her father, maternal grandfather, and paternal grandfather; Migraines in her father and mother. SOCIAL HISTORY     Patient  reports that she has been smoking cigarettes. She has been smoking about 0.50 packs per day. She has never used smokeless tobacco. She reports current drug use. Drug: Marijuana. She reports that she does not drink alcohol. PHYSICAL EXAM     ED TRIAGE VITALS  BP: 112/74, Temp: 97.2 °F (36.2 °C), Pulse: 90, Resp: 16, SpO2: 98 %  Physical Exam  Vitals and nursing note reviewed. Constitutional:       General: She is not in acute distress. Appearance: Normal appearance. She is normal weight. She is not ill-appearing, toxic-appearing or diaphoretic. HENT:      Head: Normocephalic and atraumatic. Right Ear: External ear normal.      Left Ear: External ear normal.   Eyes:      Extraocular Movements: Extraocular movements intact. Conjunctiva/sclera: Conjunctivae normal.   Pulmonary:      Effort: Pulmonary effort is normal.   Musculoskeletal:         General: Normal range of motion. Cervical back: Normal range of motion.    Skin: General: Skin is warm. Neurological:      General: No focal deficit present. Mental Status: She is alert and oriented to person, place, and time. Psychiatric:         Mood and Affect: Mood normal.         Behavior: Behavior normal.         Thought Content: Thought content normal.         Judgment: Judgment normal.         DIAGNOSTIC RESULTS   Labs:No results found for this visit on 07/22/21. IMAGING:  No orders to display     URGENT CARE COURSE:     Vitals:    07/22/21 0955   BP: 112/74   Pulse: 90   Resp: 16   Temp: 97.2 °F (36.2 °C)   TempSrc: Temporal   SpO2: 98%   Weight: 160 lb (72.6 kg)   Height: 5' (1.524 m)       Medications   ondansetron (ZOFRAN-ODT) disintegrating tablet 4 mg (4 mg Oral Given 7/22/21 1014)   ketorolac (TORADOL) injection 60 mg (60 mg Intramuscular Given 7/22/21 1014)     PROCEDURES:  None  FINAL IMPRESSION      1. Intractable ophthalmoplegic migraine        DISPOSITION/PLAN   Discharge - Pending Orders Complete    The patient presents with a benign-appearing headache. My suspicion for serious pathology is low given a lack of significant risk factors and reassuring history and physical examination. I see nothing to suggest trauma,subarachnoid hemorrhage, meningitis, encephalitis, mass lesion, bleeding or thrombosis. I feel the patient can be safely discharged to home with outpatient follow up. Instructions have been given for the patient to dial 911 or go straight to the emergency department if there is any significant worsening of the headache or the development of confusion, vision change, weakness, numbness, difficulty with speech or walking.       PATIENT REFERRED TO:  Campbell Hassan, APRN - CNP  [address] [facility], [address] [place] [address]  [address] [address] [phone]    Schedule an appointment as soon as possible for a visit in 3 days  For re-check    [facility]  [phone]  Go today  If symptoms worsen    DISCHARGE MEDICATIONS:  New Prescriptions HYDROXYZINE (ATARAX) 50 MG TABLET    Take 1 tablet by mouth 3 times daily as needed for Itching    PROMETHAZINE (PROMETHEGAN) 25 MG SUPPOSITORY    Place 1 suppository rectally every 6 hours as needed for Nausea     Current Discharge Medication List          Renita Garcia, EVA - [facility] [place] - CNP  07/22/21 5081 normal rate and rhythm, no chest pain and no edema.

## 2021-07-26 NOTE — ED ADULT TRIAGE NOTE - ACCOMPANIED BY
Candy Mathur  : 1944  Primary: [employer]age  Secondary:  [address] at [place] [id number] [place] [address], [place] [address].  [phone]   [phone]       OUTPATIENT OCCUPATIONAL THERAPY: Daily Treatment Note 2021  Visit Count:  1    ICD-10: Treatment Diagnosis:   Lymphedema, not elsewhere classified (I89.0)  Precautions/Allergies:   Aspirin, Barbiturates, Codeine, Demerol [meperidine], Indomethacin, Penicillins, and Sulfa (sulfonamide antibiotics)   TREATMENT PLAN:  Effective Dates: 2021 TO 10/24/2021 (90 days). Frequency/Duration: 2 times a week for 90 Day(s)    Pre-treatment Symptoms/Complaints:  Patient reports she has difficulty with all of her daily activities but that she does not want to ever go to a nursing home. Pain: Initial: Pain Intensity 1: 7  Pain Location 1: Leg, Foot  Pain Orientation 1: Right, Left  Post Session:  7/10   Medications Last Reviewed:  2021  Updated Objective Findings:  See evaluation note from today and see below   Edema/Girth:  pitting    Left Right    Initial Most Recent Initial Most Recent   Upper  Extremity           Lower  Extremity 5th Tuberosity (cm): 21  Ankle (cm): 27.5  Calf (cm): 43.7  Mid Knee (cm): 37.3   5th Tuberosity (cm): 21.2 (18 cm from base of heel)  Ankle (cm): 27.5 (10 cm from base of heel)  Calf (cm): 43.2 (30 cm from base of heel)  Mid Knee (cm): 38.6 (40 cm from base of heel)          TREATMENT:     Self Care: (15 minutes): Procedure(s) (per grid) utilized to improve and/or restore self-care/home management as related to home management of lymphedema. Required maximal verbal cueing to facilitate understanding of lymphedema plan of care. Patient educated re: lymphedema diagnosis, risk reduction practices, and treatment to include skin care, compression, manual lymph drainage (MLD) and therapeutic exercise.  She was educated to bring loose and/or adjustable shoes to next visit; she verbalized and/or demonstrated understanding of all education. Treatment/Session Summary:    · Response to Treatment:  Patient agreeable to goals and plan of care. · Communication/Consultation:  None today  · Equipment provided today:  None today  · Recommendations/Intent for next treatment session: Next visit will focus on maximizing independence with home management of lymphedema.     Total Treatment Billable Duration:  15 minutes in addition to moderate complexity evaluation  OT Patient Time In/Time Out  Time In: 1525  Time Out: [address], OT    Future Appointments   Date Time Provider Cesia Ramon   7/29/2021  3:15 PM Maryanne Allison, OT [facility]   8/2/2021  3:15 PM Maryanne Allison, OT [facility]   8/5/2021  3:15 PM Maryanne Allison, OT [facility]   8/9/2021  2:30 PM Maryanne Allison, OT [facility]   8/11/2021  3:15 PM Maryanne Allison, OT [facility]   8/16/2021  3:15 PM Maryanne Allison, OT [facility]   8/19/2021  3:15 PM Maryanne Allison, OT [facility] EMT/paramedic

## 2022-06-25 NOTE — ED STATDOCS - OBJECTIVE STATEMENT
89 y/o F pt with hx of HTN and CLIFTON presents to ED with son at bedside c/o laceration to right forehead s/p mechanical fall (tripping over cord) at home approximately 1 hour PTA. No other complaints at this time. No LOC, No neck pain, no ABD pain. Is able to ambulate. Was HTN in triage, but is improving. Takes ASA, hydralazine and atenolol daily; has not taken Hydralazine and Atenolol today. Last tetanus is unknown. No further complaints at this time. Reinier Tsang DO: eval by surg who rec po chall, pt failed, had worse pain. admitted to surg.

## 2022-07-09 NOTE — ED ADULT TRIAGE NOTE - NSWEIGHTCALCTOOLDRUG_GEN_A_CORE
PT evaluation complete - see note for details. POC and goals established.    Problem: Physical Therapy  Goal: Physical Therapy Goal  Description: Goals to be met by: 22     Patient will increase functional independence with mobility by performin. Supine to sit with [place]  2. Sit to stand transfer with [place]  3. Bed to chair transfer with Supervision using LRAD  4. Gait  x 150 feet with Modified [place] using LRAD  5. Ascend/Descend 6 inch curb step with Supervision using LRAD  6. Stand for 10 minutes with Supervision using LRAD    Outcome: Ongoing, Progressing   2022      used

## 2022-10-13 PROBLEM — D63.8 CHRONIC DISEASE ANEMIA: Status: RESOLVED | Noted: 2018-01-05 | Resolved: 2022-10-13

## 2022-11-09 NOTE — DIETITIAN INITIAL EVALUATION ADULT. - FLUIDS
Internal Medicine Progress Note    Subjective     No chief complaint on file.      Interval Events:  HPIUp in the chair , Had pause this AM , Pt being watched      Inpatient Medications  Current Facility-Administered Medications   Medication Dose Route Frequency Provider Last Rate Last Admin   • flecainide (TAMBOCOR) tablet 75 mg  75 mg Oral 2 times per day Elisa Fishman CNP   75 mg at 11/08/22 2048   • apixaBAN (ELIQUIS) tablet 5 mg  5 mg Oral 2 times per day Negrito Guidry MD   5 mg at 11/08/22 2048   • hydroCHLOROthiazide (HYDRODIURIL) tablet 12.5 mg  12.5 mg Oral Daily Nergito Guidry MD   12.5 mg at 11/08/22 1004   • [Held by provider] mexiletine (MEXITIL) capsule 150 mg  150 mg Oral TID Elisa Fishman CNP   150 mg at 11/07/22 0926   • atorvastatin (LIPITOR) tablet 40 mg  40 mg Oral Nightly Negrito Guidry MD   40 mg at 11/08/22 2048   • fenofibrate (TRICOR) tablet 54 mg  54 mg Oral Nightly Elisa Fishman CNP   54 mg at 11/08/22 2048   • metFORMIN (GLUCOPHAGE) tablet 500 mg  500 mg Oral Daily with breakfast Negrito Guidry MD   500 mg at 11/08/22 1004   • NIFEdipine XL (PROCARDIA XL) ER tablet 30 mg  30 mg Oral Daily Negrito Guidry MD   30 mg at 11/08/22 1004   • losartan (COZAAR) tablet 50 mg  50 mg Oral BID Elisa Fishman CNP   50 mg at 11/08/22 2048   • [Held by provider] metoPROLOL succinate (TOPROL-XL) ER tablet 25 mg  25 mg Oral BID Elisa Fishman CNP       • dextrose 50 % injection 25 g  25 g Intravenous PRN Pernell Matias MD       • dextrose 50 % injection 12.5 g  12.5 g Intravenous PRN Pernell Matias MD       • glucagon (GLUCAGEN) injection 1 mg  1 mg Intramuscular PRN Pernell Matias MD       • dextrose (GLUTOSE) 40 % gel 15 g  15 g Oral PRN Pernell Matias MD       • dextrose (GLUTOSE) 40 % gel 30 g  30 g Oral PRN Pernell Matias MD       • insulin lispro (ADMELOG,HumaLOG) - Correction Dose   Subcutaneous TID  Pernell Matias MD   2 Units at 11/08/22 1234         Review of Systems  Patient  is up in the chair denies any chest pain shortness of breath  HEENT denies any congestion  Neck no stiffness  Lungs no shortness of breath no cough  Heart as per history  Abdomen denies any peptic ulcer disease  CNS no TIA stroke    Objective   Vitals with min/max:      Vital Last Value 24 Hour Range   Temperature 97.7 °F (36.5 °C) (11/08/22 1703) Temp  Min: 97.7 °F (36.5 °C)  Max: 98.4 °F (36.9 °C)   Pulse (!) 54 (11/08/22 1703) Pulse  Min: 45  Max: 54   Respiratory 16 (11/08/22 1703) Resp  Min: 16  Max: 20   Non-Invasive  Blood Pressure 135/78 (11/08/22 1703) BP  Min: 135/78  Max: 161/71   Pulse Oximetry 97 % (11/08/22 1703) SpO2  Min: 96 %  Max: 97 %   Arterial   Blood Pressure   No data recorded      Physical Exam  Awake alert alert oriented good response appropriate affect  Neck no masses  Lungs no rhonchi no rales no distress  Heart regular rhythm S1-S2, bradycardiac  Abdomen soft nontender no masses  Extremities no edema  CNS moving all extremities no focal weakness  Psychiatric cooperative cheerful appropriate affect      Labs     Admission on 11/01/2022   Component Date Value Ref Range Status   • Sodium 11/01/2022 136  135 - 145 mmol/L Final   • Potassium 11/01/2022 4.0  3.4 - 5.1 mmol/L Final   • Chloride 11/01/2022 101  97 - 110 mmol/L Final   • Carbon Dioxide 11/01/2022 25  21 - 32 mmol/L Final   • Anion Gap 11/01/2022 14  7 - 19 mmol/L Final   • Glucose 11/01/2022 304 (A) 70 - 99 mg/dL Final   • BUN 11/01/2022 31 (A) 6 - 20 mg/dL Final   • Creatinine 11/01/2022 1.40 (A) 0.67 - 1.17 mg/dL Final   • Glomerular Filtration Rate 11/01/2022 53 (A) >=60 Final    eGFR 30-59 mL/min/1.73m2 = Moderate decrease in kidney function. Stage 3 CKD (chronic kidney disease) or moderate kidney disease. Estimated GFR calculated using the CKD-EPI-R (2021) equation that does not include race in the creatinine calculation.   • BUN/ Creatinine Ratio 11/01/2022 22  7 - 25 Final   • Calcium 11/01/2022 8.0 (A) 8.4 - 10.2 mg/dL  Final   • Bilirubin, Total 11/01/2022 0.4  0.2 - 1.0 mg/dL Final   • GOT/AST 11/01/2022 18  <=37 Units/L Final   • GPT/ALT 11/01/2022 24  <64 Units/L Final   • Alkaline Phosphatase 11/01/2022 69  45 - 117 Units/L Final   • Albumin 11/01/2022 3.3 (A) 3.6 - 5.1 g/dL Final   • Protein, Total 11/01/2022 6.6  6.4 - 8.2 g/dL Final   • Globulin 11/01/2022 3.3  2.0 - 4.0 g/dL Final   • A/G Ratio 11/01/2022 1.0  1.0 - 2.4 Final   • Magnesium 11/01/2022 2.2  1.7 - 2.4 mg/dL Final   • Ventricular Rate EKG/Min (BPM) 11/01/2022 54   Final   • RI-Interval (MSEC) 11/01/2022 213   Final   • QRS-Interval (MSEC) 11/01/2022 94   Final   • QT-Interval (MSEC) 11/01/2022 463   Final   • QTc 11/01/2022 449   Final   • P Axis (Degrees) 11/01/2022 50   Final   • R Axis (Degrees) 11/01/2022 -6   Final   • T Axis (Degrees) 11/01/2022 5   Final   • REPORT TEXT 11/01/2022    Final                    Value:SINUS BRADYCARDIA WITH FIRST DEGREE AV BLOCK WITH FREQUENT VENTRICULAR PREMATURE COMPLEXES  MODERATE ST DEPRESSION [0.05+ mV ST DEPRESSION]  ABNORMAL ECG  UNCONFIRMED REPORT  Confirmed by RONNIE REYES, SUZETTE ([id number]) on 11/7/2022 9:51:16 AM     • WBC 11/01/2022 6.6  4.2 - 11.0 K/mcL Final   • RBC 11/01/2022 4.22 (A) 4.50 - 5.90 mil/mcL Final   • HGB 11/01/2022 13.2  13.0 - 17.0 g/dL Final   • HCT 11/01/2022 38.5 (A) 39.0 - 51.0 % Final   • MCV 11/01/2022 91.2  78.0 - 100.0 fl Final   • MCH 11/01/2022 31.3  26.0 - 34.0 pg Final   • MCHC 11/01/2022 34.3  32.0 - 36.5 g/dL Final   • PLT 11/01/2022 214  140 - 450 K/mcL Final   • RDW-CV 11/01/2022 13.1  11.0 - 15.0 % Final   • RDW-SD 11/01/2022 43.4  39.0 - 50.0 fL Final   • NRBC 11/01/2022 0  <=0 /100 WBC Final   • GLUCOSE, BEDSIDE - POINT OF CARE 11/01/2022 304 (A) 70 - 99 mg/dL Final   • GLUCOSE, BEDSIDE - POINT OF CARE 11/01/2022 167 (A) 70 - 99 mg/dL Final   • GLUCOSE, BEDSIDE - POINT OF CARE 11/02/2022 137 (A) 70 - 99 mg/dL Final   • GLUCOSE, BEDSIDE - POINT OF CARE 11/02/2022 134 (A) 70 - 99  mg/dL Final   • GLUCOSE, BEDSIDE - POINT OF CARE 11/02/2022 203 (A) 70 - 99 mg/dL Final   • GLUCOSE, BEDSIDE - POINT OF CARE 11/02/2022 168 (A) 70 - 99 mg/dL Final   • GLUCOSE, BEDSIDE - POINT OF CARE 11/03/2022 142 (A) 70 - 99 mg/dL Final   • GLUCOSE, BEDSIDE - POINT OF CARE 11/03/2022 134 (A) 70 - 99 mg/dL Final   • GLUCOSE, BEDSIDE - POINT OF CARE 11/03/2022 137 (A) 70 - 99 mg/dL Final   • GLUCOSE, BEDSIDE - POINT OF CARE 11/03/2022 181 (A) 70 - 99 mg/dL Final   • GLUCOSE, BEDSIDE - POINT OF CARE 11/04/2022 152 (A) 70 - 99 mg/dL Final   • Sodium 11/04/2022 140  135 - 145 mmol/L Final   • Potassium 11/04/2022 4.5  3.4 - 5.1 mmol/L Final   • Chloride 11/04/2022 105  97 - 110 mmol/L Final   • Carbon Dioxide 11/04/2022 25  21 - 32 mmol/L Final   • Anion Gap 11/04/2022 15  7 - 19 mmol/L Final   • Glucose 11/04/2022 188 (A) 70 - 99 mg/dL Final   • BUN 11/04/2022 21 (A) 6 - 20 mg/dL Final   • Creatinine 11/04/2022 1.09  0.67 - 1.17 mg/dL Final   • Glomerular Filtration Rate 11/04/2022 71  >=60 Final    eGFR results = or >60 mL/min/1.73m2 = Normal kidney function. Estimated GFR calculated using the CKD-EPI-R (2021) equation that does not include race in the creatinine calculation.   • BUN/ Creatinine Ratio 11/04/2022 19  7 - 25 Final   • Calcium 11/04/2022 8.5  8.4 - 10.2 mg/dL Final   • Extra Tube 11/04/2022 Hold for Add Ons   Final   • Extra Tube 11/04/2022 Hold for Add Ons   Final   • GLUCOSE, BEDSIDE - POINT OF CARE 11/04/2022 186 (A) 70 - 99 mg/dL Final   • GLUCOSE, BEDSIDE - POINT OF CARE 11/04/2022 143 (A) 70 - 99 mg/dL Final   • GLUCOSE, BEDSIDE - POINT OF CARE 11/04/2022 191 (A) 70 - 99 mg/dL Final   • GLUCOSE, BEDSIDE - POINT OF CARE 11/05/2022 139 (A) 70 - 99 mg/dL Final   • GLUCOSE, BEDSIDE - POINT OF CARE 11/05/2022 217 (A) 70 - 99 mg/dL Final   • GLUCOSE, BEDSIDE - POINT OF CARE 11/05/2022 160 (A) 70 - 99 mg/dL Final   • GLUCOSE, BEDSIDE - POINT OF CARE 11/05/2022 242 (A) 70 - 99 mg/dL Final   • GLUCOSE,  BEDSIDE - POINT OF CARE 11/06/2022 127 (A) 70 - 99 mg/dL Final   • GLUCOSE, BEDSIDE - POINT OF CARE 11/06/2022 154 (A) 70 - 99 mg/dL Final   • GLUCOSE, BEDSIDE - POINT OF CARE 11/06/2022 155 (A) 70 - 99 mg/dL Final   • GLUCOSE, BEDSIDE - POINT OF CARE 11/06/2022 170 (A) 70 - 99 mg/dL Final   • GLUCOSE, BEDSIDE - POINT OF CARE 11/07/2022 132 (A) 70 - 99 mg/dL Final   • Prothrombin Time 11/07/2022 11.5  9.7 - 11.8 sec Final   • INR 11/07/2022 1.1    Final    INR Therapeutic Range: 2.0 to 3.0 (2.5 to 3.5 recommended for recurrent thrombotic episodes and mechanical prosthetic heart valves.)   • Rapid SARS-COV-2 by PCR 11/07/2022 Not Detected  Not Detected / Detected / Presumptive Positive / Inhibitors present Final   • Isolation Guidelines 11/07/2022    Final    Do not use this test result as the sole decision-maker for discontinuation of isolation.   Clinical evaluation should be considered for other respiratory illness requiring transmission-based isolation.    -    No fever (<99.0 F/37.2 C) for at least 24 hours without the use of fever-reducing medications    AND  -    Respiratory symptoms have improved or resolved (e.g. cough, shortness of breath)     AND  -    COVID-19 negative test    See COVID-19 Deisolation Resource Guide   • Procedural Comment 11/07/2022    Final    SARS-CoV-2 nucleic acid has not been detected.     Testing was performed using the [employer] Xpert RT-PCR assay that has been given Emergency Use Authorization (EUA) by the [place] Food and Drug Administration (FDA). These results are considered definitive and do not need to be confirmed by another method.   • GLUCOSE, BEDSIDE - POINT OF CARE 11/07/2022 143 (A) 70 - 99 mg/dL Final   • GLUCOSE, BEDSIDE - POINT OF CARE 11/07/2022 193 (A) 70 - 99 mg/dL Final   • GLUCOSE, BEDSIDE - POINT OF CARE 11/07/2022 256 (A) 70 - 99 mg/dL Final   • GLUCOSE, BEDSIDE - POINT OF CARE 11/08/2022 158 (A) 70 - 99 mg/dL Final   • GLUCOSE, BEDSIDE - POINT OF CARE  11/08/2022 208 (A) 70 - 99 mg/dL Final   • GLUCOSE, BEDSIDE - POINT OF CARE 11/08/2022 136 (A) 70 - 99 mg/dL Final   • Ventricular Rate EKG/Min (BPM) 11/08/2022 52   Preliminary   • GA-Interval (MSEC) 11/08/2022 276   Preliminary   • QRS-Interval (MSEC) 11/08/2022 106   Preliminary   • QT-Interval (MSEC) 11/08/2022 501   Preliminary   • QTc 11/08/2022 481   Preliminary   • P Axis (Degrees) 11/08/2022 57   Preliminary   • R Axis (Degrees) 11/08/2022 -43   Preliminary   • T Axis (Degrees) 11/08/2022 60   Preliminary   • REPORT TEXT 11/08/2022    Preliminary                    Value:SINUS BRADYCARDIA WITH FIRST DEGREE AV BLOCK  MARKED LEFT AXIS DEVIATION [QRS AXIS < -30]  INCOMPLETE RIGHT BUNDLE BRANCH BLOCK [90+ ms QRS DURATION, TERMINAL R IN V1/V2, 40+ ms S IN I/aVL/V4/V5/V6]  MODERATE ST DEPRESSION [0.05+ mV ST DEPRESSION]  PROLONGED QT INTERVAL  ABNORMAL ECG  UNCONFIRMED REPORT     • GLUCOSE, BEDSIDE - POINT OF CARE 11/08/2022 172 (A) 70 - 99 mg/dL Final       Imaging    EP Case    (Results Pending)       Microbiology Results     None          Assessment and Plan    Assessment/Plan:  Ventricular arrhythmia  Hypertension  Hyperlipidemia  Diabetes mellitus type 2  History of bilateral pulmonary embolism  Continue home medication  Clinically stable   V  arrythmia on mexilitine, flecanide better  Discussed with family         Code Status    Code Status: Full Resuscitation      [This note used Dragon technology. Transcription errors are not uncommon and may not have been corrected prior to electronically signing the note. Should you find these errors, please consult the clinician for interpretation (or apply common sense adjustment when safe and appropriate).]    Pernell Matias MD  11/8/2022 9:00 PM    [phone]

## 2023-03-28 NOTE — ED PROVIDER NOTE - TEMPLATE
Abdominal Pain, N/V/D Imiquimod Counseling:  I discussed with the patient the risks of imiquimod including but not limited to erythema, scaling, itching, weeping, crusting, and pain.  Patient understands that the inflammatory response to imiquimod is variable from person to person and was educated regarded proper titration schedule.  If flu-like symptoms develop, patient knows to discontinue the medication and contact us.

## 2023-06-03 NOTE — ED ADULT NURSE NOTE - PRIMARY CARE PROVIDER
Patient seen and examined at bedside.    Overnight Events: no events, issues or complaints    Review of Systems:  REVIEW OF SYSTEMS:  CONSTITUTIONAL: No weakness, fevers or chills  EYES/ENT: No visual changes;  No dysphagia  NECK: No pain or stiffness  RESPIRATORY: No cough, wheezing, hemoptysis; No shortness of breath  CARDIOVASCULAR: No chest pain or palpitations; No lower extremity edema  GASTROINTESTINAL: No abdominal or epigastric pain. No nausea, vomiting, or hematemesis; No diarrhea or constipation. No melena or hematochezia.  BACK: No back pain  GENITOURINARY: No dysuria, frequency or hematuria  NEUROLOGICAL: No numbness or weakness  SKIN: No itching, burning, rashes, or lesions   All other review of systems is negative unless indicated above.    [ ] All other systems negative  [ ] Unable to assess ROS due to    Current Meds:  acetaminophen     Tablet .. 650 milliGRAM(s) Oral every 6 hours PRN  aluminum hydroxide/magnesium hydroxide/simethicone Suspension 30 milliLiter(s) Oral every 4 hours PRN  apixaban 5 milliGRAM(s) Oral every 12 hours  atorvastatin 40 milliGRAM(s) Oral at bedtime  dextrose 5%. 1000 milliLiter(s) IV Continuous <Continuous>  dextrose 5%. 1000 milliLiter(s) IV Continuous <Continuous>  dextrose 50% Injectable 25 Gram(s) IV Push once  dextrose 50% Injectable 12.5 Gram(s) IV Push once  dextrose 50% Injectable 25 Gram(s) IV Push once  dextrose Oral Gel 15 Gram(s) Oral once PRN  glucagon  Injectable 1 milliGRAM(s) IntraMuscular once  insulin lispro (ADMELOG) corrective regimen sliding scale   SubCutaneous three times a day before meals  insulin lispro (ADMELOG) corrective regimen sliding scale   SubCutaneous at bedtime  melatonin 3 milliGRAM(s) Oral at bedtime PRN  metoprolol succinate ER 25 milliGRAM(s) Oral daily  ondansetron Injectable 4 milliGRAM(s) IV Push every 8 hours PRN  pantoprazole    Tablet 40 milliGRAM(s) Oral before breakfast  valsartan 160 milliGRAM(s) Oral daily      PAST MEDICAL & SURGICAL HISTORY:  DM (diabetes mellitus)  type 2      Smoker  for 45 years, down to 1/2 a pack daily      Cerebrovascular accident (CVA)   - no residuals      Eczema  left arm      Benign essential HTN      NICM (nonischemic cardiomyopathy)      S/P repair of pectoralis muscle tear  due to lift weighting           Vitals:  T(F): 98.2 (-), Max: 99 (-)  HR: 92 (-) (82 - 92)  BP: 120/86 (-) (120/86 - 148/104)  RR: 18 (-)  SpO2: 100% ()  I&O's Summary      Physical Exam:  Appearance: No acute distress; well appearing  Eyes: PERRL, EOMI, pink conjunctiva  HENT: Normal oral mucosa  Cardiovascular: RRR, S1, S2, no murmurs, rubs, or gallops; 1+ edema; no JVD  Respiratory: Clear to auscultation bilaterally  Gastrointestinal: soft, non-tender, non-distended with normal bowel sounds  Musculoskeletal: No clubbing; no joint deformity   Neurologic: Non-focal  Lymphatic: No lymphadenopathy  Psychiatry: AAOx3, mood & affect appropriate  Skin: No rashes, ecchymoses, or cyanosis                          13.4   7.80  )-----------( 273      ( 2023 05:51 )             40.1         138  |  105  |  20  ----------------------------<  193<H>  4.2   |  18<L>  |  1.28    Ca    9.6      2023 05:51    TPro  6.7  /  Alb  4.3  /  TBili  0.9  /  DBili  x   /  AST  18  /  ALT  19  /  AlkPhos  83              Total Cholesterol: 146  LDL: --  HDL: 49  T           Dr. Luis Schneider

## 2023-10-23 NOTE — PHYSICAL THERAPY INITIAL EVALUATION ADULT - ASSISTIVE DEVICE FOR TRANSFER: SIT/STAND, REHAB EVAL
Curettage Text: The wound bed was treated with curettage after the biopsy was performed. hand-hold assist

## 2024-01-17 NOTE — ED PROVIDER NOTE - ENMT, MLM
intact. No signs of agitated mood.   LUNGS:  No increased work of breathing, good air exchange, clear to auscultation bilaterally, no crackles or wheezing  CARDIOVASCULAR:  Normal apical impulse, regular rate and rhythm,   ABDOMEN:  Normal bowel sounds, soft, non-distended, non-tender,     LEFT BREAST: Rash is not noted, There are no masses palpated, no axillary lymphadenopathy, no nipple discharge. No breast pain. There are  previous scars-moderate radiation therapy changes to the skin    RIGHT BREAST: Rash is not noted, There are no masses palpated, no axillary lymphadenopathy, no nipple discharge. No breast pain. There are no previous scars-ptosis of the right breast noted more caudally when compared to the left breast mound      DATA:    Radiology Review:  EXAMINATION:  SCREENING DIGITAL BILATERAL MAMMOGRAM WITH TOMOSYNTHESIS, 9/18/2023     TECHNIQUE:  Screening mammography of the bilateral breasts was performed with  tomosynthesis.  2D standard and 3D tomosynthesis combination imaging  performed through both breasts in the MLO and CC projection.  Computer aided  detection was utilized in the interpretation of this exam.     COMPARISON:  Multiple prior studies, the most recent dated February 7, 2023     HISTORY:  Screening. TC score of 100%.     FINDINGS:  Breast tissue is extremely dense which limits the sensitivity of mammography.  No suspicious mass, microcalcifications, or architectural distortion  identified in either breast.     IMPRESSION:  No mammographic evidence of malignancy in either breast.     RECOMMENDATION:     Annual bilateral mammogram is advised with consideration for annual bilateral  screening ultrasound given the patient's breast density.     BIRADS:  BIRADS - CATEGORY 1     Breast Measurements were taken and are noted in the media section.    IMPRESSION/RECOMMENDATIONS:      Diagnosis -history of left breast cancer.        Plastic and Reconstructive surgical procedure-right mastopexy, 
Airway patent, Nasal mucosa clear. Mouth with normal mucosa. Throat has no vesicles, no oropharyngeal exudates and uvula is midline.

## 2024-10-24 NOTE — PROGRESS NOTE ADULT - PROBLEM/PLAN-5
DISPLAY PLAN FREE TEXT
What Is The Reason For Today's Visit?: Preventative Skin Check
Additional History: Full body skin check. Hx of HAKs and BCC